# Patient Record
Sex: FEMALE | Race: WHITE | Employment: UNEMPLOYED | URBAN - METROPOLITAN AREA
[De-identification: names, ages, dates, MRNs, and addresses within clinical notes are randomized per-mention and may not be internally consistent; named-entity substitution may affect disease eponyms.]

---

## 2023-08-27 ENCOUNTER — HOSPITAL ENCOUNTER (INPATIENT)
Age: 73
LOS: 2 days | Discharge: HOME | DRG: 311 | End: 2023-08-29
Attending: INTERNAL MEDICINE | Admitting: INTERNAL MEDICINE
Payer: MEDICARE

## 2023-08-27 ENCOUNTER — APPOINTMENT (OUTPATIENT)
Dept: GENERAL RADIOLOGY | Age: 73
DRG: 311 | End: 2023-08-27
Payer: MEDICARE

## 2023-08-27 DIAGNOSIS — I49.8 ACCELERATED JUNCTIONAL RHYTHM: ICD-10-CM

## 2023-08-27 DIAGNOSIS — R07.9 CHEST PAIN, UNSPECIFIED TYPE: Primary | ICD-10-CM

## 2023-08-27 PROBLEM — E03.9 HYPOTHYROID: Status: ACTIVE | Noted: 2023-08-27

## 2023-08-27 PROBLEM — E78.00 HIGH CHOLESTEROL: Status: ACTIVE | Noted: 2023-08-27

## 2023-08-27 PROBLEM — I48.91 ATRIAL FIBRILLATION (HCC): Status: ACTIVE | Noted: 2023-08-27

## 2023-08-27 PROBLEM — I10 HTN (HYPERTENSION): Status: ACTIVE | Noted: 2023-08-27

## 2023-08-27 PROBLEM — I20.8 ANGINA AT REST (HCC): Status: ACTIVE | Noted: 2023-08-27

## 2023-08-27 LAB
ALBUMIN SERPL-MCNC: 3.6 G/DL (ref 3.4–5)
ALBUMIN/GLOB SERPL: 1 {RATIO} (ref 1.1–2.2)
ALP SERPL-CCNC: 96 U/L (ref 40–129)
ALT SERPL-CCNC: 15 U/L (ref 10–40)
ANION GAP SERPL CALCULATED.3IONS-SCNC: 9 MMOL/L (ref 3–16)
AST SERPL-CCNC: 31 U/L (ref 15–37)
BASOPHILS # BLD: 0 K/UL (ref 0–0.2)
BASOPHILS NFR BLD: 0.3 %
BILIRUB SERPL-MCNC: 0.7 MG/DL (ref 0–1)
BILIRUB UR QL STRIP.AUTO: NEGATIVE
BUN SERPL-MCNC: 17 MG/DL (ref 7–20)
CALCIUM SERPL-MCNC: 9.7 MG/DL (ref 8.3–10.6)
CHLORIDE SERPL-SCNC: 102 MMOL/L (ref 99–110)
CLARITY UR: CLEAR
CO2 SERPL-SCNC: 27 MMOL/L (ref 21–32)
COLOR UR: YELLOW
CREAT SERPL-MCNC: 0.9 MG/DL (ref 0.6–1.2)
D DIMER: 0.33 UG/ML FEU (ref 0–0.6)
D DIMER: <0.27 UG/ML FEU (ref 0–0.6)
DEPRECATED RDW RBC AUTO: 13.9 % (ref 12.4–15.4)
EOSINOPHIL # BLD: 0.1 K/UL (ref 0–0.6)
EOSINOPHIL NFR BLD: 1.6 %
GFR SERPLBLD CREATININE-BSD FMLA CKD-EPI: >60 ML/MIN/{1.73_M2}
GLUCOSE SERPL-MCNC: 115 MG/DL (ref 70–99)
GLUCOSE UR STRIP.AUTO-MCNC: NEGATIVE MG/DL
HCT VFR BLD AUTO: 39.1 % (ref 36–48)
HGB BLD-MCNC: 12.5 G/DL (ref 12–16)
HGB UR QL STRIP.AUTO: NEGATIVE
KETONES UR STRIP.AUTO-MCNC: NEGATIVE MG/DL
LEUKOCYTE ESTERASE UR QL STRIP.AUTO: NEGATIVE
LIPASE SERPL-CCNC: 32 U/L (ref 13–60)
LYMPHOCYTES # BLD: 1.1 K/UL (ref 1–5.1)
LYMPHOCYTES NFR BLD: 17.1 %
MCH RBC QN AUTO: 28.5 PG (ref 26–34)
MCHC RBC AUTO-ENTMCNC: 31.9 G/DL (ref 31–36)
MCV RBC AUTO: 89.5 FL (ref 80–100)
MONOCYTES # BLD: 0.5 K/UL (ref 0–1.3)
MONOCYTES NFR BLD: 7.6 %
NEUTROPHILS # BLD: 4.7 K/UL (ref 1.7–7.7)
NEUTROPHILS NFR BLD: 73.4 %
NITRITE UR QL STRIP.AUTO: NEGATIVE
NT-PROBNP SERPL-MCNC: 331 PG/ML (ref 0–124)
PH UR STRIP.AUTO: 5.5 [PH] (ref 5–8)
PLATELET # BLD AUTO: 164 K/UL (ref 135–450)
PMV BLD AUTO: 9 FL (ref 5–10.5)
POTASSIUM SERPL-SCNC: 3.9 MMOL/L (ref 3.5–5.1)
PROT SERPL-MCNC: 7.2 G/DL (ref 6.4–8.2)
PROT UR STRIP.AUTO-MCNC: NEGATIVE MG/DL
RBC # BLD AUTO: 4.37 M/UL (ref 4–5.2)
SODIUM SERPL-SCNC: 138 MMOL/L (ref 136–145)
SP GR UR STRIP.AUTO: >=1.03 (ref 1–1.03)
TROPONIN, HIGH SENSITIVITY: 33 NG/L (ref 0–14)
TROPONIN, HIGH SENSITIVITY: 37 NG/L (ref 0–14)
TROPONIN, HIGH SENSITIVITY: 39 NG/L (ref 0–14)
UA COMPLETE W REFLEX CULTURE PNL UR: NORMAL
UA DIPSTICK W REFLEX MICRO PNL UR: NORMAL
URN SPEC COLLECT METH UR: NORMAL
UROBILINOGEN UR STRIP-ACNC: 0.2 E.U./DL
WBC # BLD AUTO: 6.3 K/UL (ref 4–11)

## 2023-08-27 PROCEDURE — 81003 URINALYSIS AUTO W/O SCOPE: CPT

## 2023-08-27 PROCEDURE — 6370000000 HC RX 637 (ALT 250 FOR IP): Performed by: INTERNAL MEDICINE

## 2023-08-27 PROCEDURE — 83880 ASSAY OF NATRIURETIC PEPTIDE: CPT

## 2023-08-27 PROCEDURE — 1200000000 HC SEMI PRIVATE

## 2023-08-27 PROCEDURE — 99285 EMERGENCY DEPT VISIT HI MDM: CPT

## 2023-08-27 PROCEDURE — 85379 FIBRIN DEGRADATION QUANT: CPT

## 2023-08-27 PROCEDURE — 2580000003 HC RX 258: Performed by: INTERNAL MEDICINE

## 2023-08-27 PROCEDURE — 85025 COMPLETE CBC W/AUTO DIFF WBC: CPT

## 2023-08-27 PROCEDURE — 36415 COLL VENOUS BLD VENIPUNCTURE: CPT

## 2023-08-27 PROCEDURE — 83690 ASSAY OF LIPASE: CPT

## 2023-08-27 PROCEDURE — 2060000000 HC ICU INTERMEDIATE R&B

## 2023-08-27 PROCEDURE — 71045 X-RAY EXAM CHEST 1 VIEW: CPT

## 2023-08-27 PROCEDURE — 93005 ELECTROCARDIOGRAM TRACING: CPT | Performed by: INTERNAL MEDICINE

## 2023-08-27 PROCEDURE — 84484 ASSAY OF TROPONIN QUANT: CPT

## 2023-08-27 PROCEDURE — 6370000000 HC RX 637 (ALT 250 FOR IP): Performed by: PHYSICIAN ASSISTANT

## 2023-08-27 PROCEDURE — 80053 COMPREHEN METABOLIC PANEL: CPT

## 2023-08-27 RX ORDER — POTASSIUM CHLORIDE 7.45 MG/ML
10 INJECTION INTRAVENOUS PRN
Status: DISCONTINUED | OUTPATIENT
Start: 2023-08-27 | End: 2023-08-29 | Stop reason: HOSPADM

## 2023-08-27 RX ORDER — ACETAMINOPHEN 650 MG/1
650 SUPPOSITORY RECTAL EVERY 6 HOURS PRN
Status: DISCONTINUED | OUTPATIENT
Start: 2023-08-27 | End: 2023-08-29 | Stop reason: HOSPADM

## 2023-08-27 RX ORDER — LOTEPREDNOL ETABONATE 5 MG/ML
1 SUSPENSION/ DROPS OPHTHALMIC 2 TIMES DAILY
COMMUNITY

## 2023-08-27 RX ORDER — HYDRALAZINE HYDROCHLORIDE 20 MG/ML
10 INJECTION INTRAMUSCULAR; INTRAVENOUS EVERY 6 HOURS PRN
Status: DISCONTINUED | OUTPATIENT
Start: 2023-08-27 | End: 2023-08-29 | Stop reason: HOSPADM

## 2023-08-27 RX ORDER — HYDROCORTISONE AND IODOQUINOL 10; 10 MG/G; MG/G
CREAM TOPICAL 2 TIMES DAILY PRN
COMMUNITY

## 2023-08-27 RX ORDER — SPIRONOLACTONE 25 MG/1
25 TABLET ORAL DAILY
COMMUNITY

## 2023-08-27 RX ORDER — ENOXAPARIN SODIUM 100 MG/ML
40 INJECTION SUBCUTANEOUS DAILY
Status: DISCONTINUED | OUTPATIENT
Start: 2023-08-27 | End: 2023-08-27 | Stop reason: ALTCHOICE

## 2023-08-27 RX ORDER — ATORVASTATIN CALCIUM 10 MG/1
10 TABLET, FILM COATED ORAL DAILY
Status: DISCONTINUED | OUTPATIENT
Start: 2023-08-27 | End: 2023-08-28

## 2023-08-27 RX ORDER — RIVAROXABAN 10 MG/1
10 TABLET, FILM COATED ORAL EVERY EVENING
COMMUNITY

## 2023-08-27 RX ORDER — POTASSIUM CHLORIDE 20 MEQ/1
40 TABLET, EXTENDED RELEASE ORAL PRN
Status: DISCONTINUED | OUTPATIENT
Start: 2023-08-27 | End: 2023-08-29 | Stop reason: HOSPADM

## 2023-08-27 RX ORDER — METOPROLOL SUCCINATE 50 MG/1
50 TABLET, EXTENDED RELEASE ORAL EVERY EVENING
COMMUNITY

## 2023-08-27 RX ORDER — ACETAMINOPHEN 325 MG/1
650 TABLET ORAL EVERY 6 HOURS PRN
Status: DISCONTINUED | OUTPATIENT
Start: 2023-08-27 | End: 2023-08-29 | Stop reason: HOSPADM

## 2023-08-27 RX ORDER — ASPIRIN 81 MG/1
324 TABLET, CHEWABLE ORAL ONCE
Status: COMPLETED | OUTPATIENT
Start: 2023-08-27 | End: 2023-08-27

## 2023-08-27 RX ORDER — DOFETILIDE 0.5 MG/1
500 CAPSULE ORAL 2 TIMES DAILY
COMMUNITY

## 2023-08-27 RX ORDER — LOSARTAN POTASSIUM 100 MG/1
100 TABLET ORAL DAILY
Status: DISCONTINUED | OUTPATIENT
Start: 2023-08-27 | End: 2023-08-29 | Stop reason: HOSPADM

## 2023-08-27 RX ORDER — ALBUTEROL SULFATE 90 UG/1
2 AEROSOL, METERED RESPIRATORY (INHALATION) EVERY 6 HOURS PRN
COMMUNITY

## 2023-08-27 RX ORDER — ONDANSETRON 4 MG/1
4 TABLET, ORALLY DISINTEGRATING ORAL EVERY 8 HOURS PRN
Status: DISCONTINUED | OUTPATIENT
Start: 2023-08-27 | End: 2023-08-29 | Stop reason: HOSPADM

## 2023-08-27 RX ORDER — METFORMIN HYDROCHLORIDE 500 MG/1
500 TABLET, EXTENDED RELEASE ORAL
COMMUNITY

## 2023-08-27 RX ORDER — FUROSEMIDE 40 MG/1
40 TABLET ORAL 2 TIMES DAILY PRN
COMMUNITY

## 2023-08-27 RX ORDER — SODIUM CHLORIDE 0.9 % (FLUSH) 0.9 %
5-40 SYRINGE (ML) INJECTION EVERY 12 HOURS SCHEDULED
Status: DISCONTINUED | OUTPATIENT
Start: 2023-08-27 | End: 2023-08-29 | Stop reason: HOSPADM

## 2023-08-27 RX ORDER — 0.9 % SODIUM CHLORIDE 0.9 %
500 INTRAVENOUS SOLUTION INTRAVENOUS PRN
Status: DISCONTINUED | OUTPATIENT
Start: 2023-08-27 | End: 2023-08-29 | Stop reason: HOSPADM

## 2023-08-27 RX ORDER — ASPIRIN 81 MG/1
81 TABLET, CHEWABLE ORAL DAILY
Status: DISCONTINUED | OUTPATIENT
Start: 2023-08-28 | End: 2023-08-29 | Stop reason: HOSPADM

## 2023-08-27 RX ORDER — LEVOTHYROXINE SODIUM 0.1 MG/1
100 TABLET ORAL DAILY
Status: DISCONTINUED | OUTPATIENT
Start: 2023-08-27 | End: 2023-08-29 | Stop reason: HOSPADM

## 2023-08-27 RX ORDER — ATORVASTATIN CALCIUM 20 MG/1
20 TABLET, FILM COATED ORAL NIGHTLY
COMMUNITY

## 2023-08-27 RX ORDER — METOPROLOL SUCCINATE 50 MG/1
50 TABLET, EXTENDED RELEASE ORAL DAILY
Status: DISCONTINUED | OUTPATIENT
Start: 2023-08-27 | End: 2023-08-29 | Stop reason: HOSPADM

## 2023-08-27 RX ORDER — SODIUM CHLORIDE 0.9 % (FLUSH) 0.9 %
10 SYRINGE (ML) INJECTION PRN
Status: DISCONTINUED | OUTPATIENT
Start: 2023-08-27 | End: 2023-08-29 | Stop reason: HOSPADM

## 2023-08-27 RX ORDER — FUROSEMIDE 20 MG/1
20 TABLET ORAL 2 TIMES DAILY
Status: DISCONTINUED | OUTPATIENT
Start: 2023-08-27 | End: 2023-08-29 | Stop reason: HOSPADM

## 2023-08-27 RX ORDER — LOSARTAN POTASSIUM 25 MG/1
25 TABLET ORAL EVERY EVENING
COMMUNITY

## 2023-08-27 RX ORDER — LEVOTHYROXINE SODIUM 0.05 MG/1
50 TABLET ORAL DAILY
COMMUNITY

## 2023-08-27 RX ORDER — POTASSIUM CHLORIDE 20 MEQ/1
40 TABLET, EXTENDED RELEASE ORAL PRN
Status: DISCONTINUED | OUTPATIENT
Start: 2023-08-27 | End: 2023-08-27 | Stop reason: SDUPTHER

## 2023-08-27 RX ORDER — POTASSIUM CHLORIDE 7.45 MG/ML
10 INJECTION INTRAVENOUS PRN
Status: DISCONTINUED | OUTPATIENT
Start: 2023-08-27 | End: 2023-08-27 | Stop reason: SDUPTHER

## 2023-08-27 RX ORDER — NITROGLYCERIN 0.4 MG/1
0.4 TABLET SUBLINGUAL EVERY 5 MIN PRN
Status: DISCONTINUED | OUTPATIENT
Start: 2023-08-27 | End: 2023-08-29 | Stop reason: HOSPADM

## 2023-08-27 RX ORDER — ATORVASTATIN CALCIUM 40 MG/1
40 TABLET, FILM COATED ORAL NIGHTLY
Status: DISCONTINUED | OUTPATIENT
Start: 2023-08-27 | End: 2023-08-27 | Stop reason: SDUPTHER

## 2023-08-27 RX ORDER — ONDANSETRON 2 MG/ML
4 INJECTION INTRAMUSCULAR; INTRAVENOUS EVERY 6 HOURS PRN
Status: DISCONTINUED | OUTPATIENT
Start: 2023-08-27 | End: 2023-08-28 | Stop reason: ALTCHOICE

## 2023-08-27 RX ORDER — SODIUM CHLORIDE 9 MG/ML
INJECTION, SOLUTION INTRAVENOUS PRN
Status: DISCONTINUED | OUTPATIENT
Start: 2023-08-27 | End: 2023-08-29 | Stop reason: HOSPADM

## 2023-08-27 RX ADMIN — LOSARTAN POTASSIUM 100 MG: 100 TABLET, FILM COATED ORAL at 17:43

## 2023-08-27 RX ADMIN — METOPROLOL SUCCINATE 50 MG: 50 TABLET, EXTENDED RELEASE ORAL at 17:44

## 2023-08-27 RX ADMIN — ASPIRIN 81 MG 324 MG: 81 TABLET ORAL at 14:14

## 2023-08-27 RX ADMIN — ATORVASTATIN CALCIUM 10 MG: 10 TABLET, FILM COATED ORAL at 17:43

## 2023-08-27 RX ADMIN — FUROSEMIDE 20 MG: 20 TABLET ORAL at 17:43

## 2023-08-27 RX ADMIN — RIVAROXABAN 10 MG: 10 TABLET, FILM COATED ORAL at 17:43

## 2023-08-27 RX ADMIN — SODIUM CHLORIDE, PRESERVATIVE FREE 10 ML: 5 INJECTION INTRAVENOUS at 22:06

## 2023-08-27 ASSESSMENT — ENCOUNTER SYMPTOMS
ABDOMINAL PAIN: 0
NAUSEA: 0
DIARRHEA: 0
SHORTNESS OF BREATH: 0
VOMITING: 0
CHEST TIGHTNESS: 0
COUGH: 0

## 2023-08-27 ASSESSMENT — PAIN SCALES - GENERAL: PAINLEVEL_OUTOF10: 3

## 2023-08-27 ASSESSMENT — LIFESTYLE VARIABLES
HOW MANY STANDARD DRINKS CONTAINING ALCOHOL DO YOU HAVE ON A TYPICAL DAY: PATIENT DOES NOT DRINK
HOW OFTEN DO YOU HAVE A DRINK CONTAINING ALCOHOL: NEVER

## 2023-08-27 ASSESSMENT — HEART SCORE: ECG: 1

## 2023-08-27 NOTE — ED NOTES
ED TO INPATIENT SBAR HANDOFF    Patient Name: Trevon Mcgrath   :  1950  71 y.o. MRN:  [id number]  Preferred Name  Maylin Lynn   ED Room #:  ED-0019/19  Family/Caregiver Present yes   Restraints no   Sitter no   Sepsis Risk Score Sepsis Risk Score: 0.46    Situation  Code Status: No Order No additional code details. Allergies: Ceftin [cefuroxime], Vancomycin, Zestril [lisinopril], and Other  Weight: Patient Vitals for the past 96 hrs (Last 3 readings):   Weight   23 1148 260 lb (117.9 kg)     Arrived from: home  Chief Complaint:   Chief Complaint   Patient presents with    Chest Pain     PT to ED c/o mid lower chest/epigastric pain that began this past Friday morning after waking up. PT denies nausea. PT has not taken ASA today. PT reports history of A-fib. Pt is on blood thinners. Pt home town [place] and is here for a concert. PT reports unchanged SOB or swelling to legs. Hospital Problem/Diagnosis:  Principal Problem:    Chest pain  Active Problems:    HTN (hypertension)    High cholesterol    Atrial fibrillation (HCC)    Hypothyroid    Angina at rest [facility])  Resolved Problems:    * No resolved hospital problems. *    Imaging:   XR CHEST PORTABLE   Final Result   Bibasilar opacification, left side greater than right, may represent   atelectasis or pneumonia.            Abnormal labs:   Abnormal Labs Reviewed   COMPREHENSIVE METABOLIC PANEL - Abnormal; Notable for the following components:       Result Value    Glucose 115 (*)     Albumin/Globulin Ratio 1.0 (*)     All other components within normal limits   TROPONIN - Abnormal; Notable for the following components:    Troponin, High Sensitivity 39 (*)     All other components within normal limits   BRAIN NATRIURETIC PEPTIDE - Abnormal; Notable for the following components:    Pro- (*)     All other components within normal limits   TROPONIN - Abnormal; Notable for the following components:    Troponin, High Sensitivity 33 (*)     All other

## 2023-08-27 NOTE — H&P
cholesterol [E78.00]     Atrial fibrillation (HCC) [I48.91]     Hypothyroid [E03.9]     Chest pain [R07.9]          Review of Systems: (1 system for EPF, 2-9 for detailed, 10+ for comprehensive)  Constitutional: Negative for chills and fever. HENT: Negative for dental problem, nosebleeds and rhinorrhea. Eyes: Negative for photophobia and visual disturbance. Respiratory: Negative for cough, chest tightness and shortness of breath. Cardiovascular: Negative for chest pain and leg swelling. Gastrointestinal: Negative for diarrhea, nausea and vomiting. Endocrine: Negative for polydipsia and polyphagia. Genitourinary: Negative for frequency, hematuria and urgency. Musculoskeletal: Negative for back pain and myalgias. Skin: Negative for rash. Allergic/Immunologic: Negative for food allergies. Neurological: Negative for dizziness, seizures, syncope and facial asymmetry. Hematological: Negative for adenopathy. Psychiatric/Behavioral: Negative for dysphoric mood. The patient is not nervous/anxious. Past Medical History:   Past Medical History:   Diagnosis Date    A-fib ([address])     Hyperlipidemia     Hypertension        Past Surgical History: History reviewed. No pertinent surgical history. Social History:   Social History       Tobacco History       Smoking Status  Never      Smokeless Tobacco Use  Never              Alcohol History       Alcohol Use Status  Never              Drug Use       Drug Use Status  Never              Sexual Activity       Sexually Active  Defer                    Fam History: History reviewed. No pertinent family history. PFSH: The above PMHx, PSHx, SocHx, FamHx has been reviewed by myself. (1 area for detailed, 2-3 for comprehensive)      Code Status: No Order    Meds - following list of home medications fromelectronic chart has been reviewed by myself  Prior to Admission medications    Medication Sig Start Date End Date Taking?

## 2023-08-27 NOTE — ED NOTES
Report given to 3T RN. No further questions at this time. Pt transported to floor on tele.       Ana Hutchison RN  08/27/23 9716

## 2023-08-28 ENCOUNTER — APPOINTMENT (OUTPATIENT)
Dept: CT IMAGING | Age: 73
DRG: 311 | End: 2023-08-28
Payer: MEDICARE

## 2023-08-28 LAB
ANION GAP SERPL CALCULATED.3IONS-SCNC: 9 MMOL/L (ref 3–16)
BASOPHILS # BLD: 0 K/UL (ref 0–0.2)
BASOPHILS NFR BLD: 0.4 %
BUN SERPL-MCNC: 16 MG/DL (ref 7–20)
CALCIUM SERPL-MCNC: 9.6 MG/DL (ref 8.3–10.6)
CHLORIDE SERPL-SCNC: 105 MMOL/L (ref 99–110)
CHOLEST SERPL-MCNC: 127 MG/DL (ref 0–199)
CO2 SERPL-SCNC: 25 MMOL/L (ref 21–32)
CREAT SERPL-MCNC: 0.8 MG/DL (ref 0.6–1.2)
DEPRECATED RDW RBC AUTO: 13.7 % (ref 12.4–15.4)
EKG ATRIAL RATE: 91 BPM
EKG DIAGNOSIS: NORMAL
EKG P-R INTERVAL: 310 MS
EKG Q-T INTERVAL: 370 MS
EKG QRS DURATION: 92 MS
EKG QTC CALCULATION (BAZETT): 450 MS
EKG R AXIS: -11 DEGREES
EKG T AXIS: 49 DEGREES
EKG VENTRICULAR RATE: 89 BPM
EOSINOPHIL # BLD: 0.2 K/UL (ref 0–0.6)
EOSINOPHIL NFR BLD: 3.1 %
GFR SERPLBLD CREATININE-BSD FMLA CKD-EPI: >60 ML/MIN/{1.73_M2}
GLUCOSE SERPL-MCNC: 107 MG/DL (ref 70–99)
HCT VFR BLD AUTO: 37.2 % (ref 36–48)
HDLC SERPL-MCNC: 35 MG/DL (ref 40–60)
HGB BLD-MCNC: 12.2 G/DL (ref 12–16)
LDLC SERPL CALC-MCNC: 69 MG/DL
LYMPHOCYTES # BLD: 1.1 K/UL (ref 1–5.1)
LYMPHOCYTES NFR BLD: 20.9 %
MAGNESIUM SERPL-MCNC: 1.8 MG/DL (ref 1.8–2.4)
MCH RBC QN AUTO: 29 PG (ref 26–34)
MCHC RBC AUTO-ENTMCNC: 32.8 G/DL (ref 31–36)
MCV RBC AUTO: 88.5 FL (ref 80–100)
MONOCYTES # BLD: 0.4 K/UL (ref 0–1.3)
MONOCYTES NFR BLD: 8.1 %
NEUTROPHILS # BLD: 3.6 K/UL (ref 1.7–7.7)
NEUTROPHILS NFR BLD: 67.5 %
PLATELET # BLD AUTO: 161 K/UL (ref 135–450)
PMV BLD AUTO: 8.8 FL (ref 5–10.5)
POTASSIUM SERPL-SCNC: 4 MMOL/L (ref 3.5–5.1)
RBC # BLD AUTO: 4.2 M/UL (ref 4–5.2)
SODIUM SERPL-SCNC: 139 MMOL/L (ref 136–145)
TRIGL SERPL-MCNC: 114 MG/DL (ref 0–150)
VLDLC SERPL CALC-MCNC: 23 MG/DL
WBC # BLD AUTO: 5.4 K/UL (ref 4–11)

## 2023-08-28 PROCEDURE — 6360000004 HC RX CONTRAST MEDICATION: Performed by: INTERNAL MEDICINE

## 2023-08-28 PROCEDURE — 83735 ASSAY OF MAGNESIUM: CPT

## 2023-08-28 PROCEDURE — 2580000003 HC RX 258: Performed by: INTERNAL MEDICINE

## 2023-08-28 PROCEDURE — 6370000000 HC RX 637 (ALT 250 FOR IP): Performed by: INTERNAL MEDICINE

## 2023-08-28 PROCEDURE — 1200000000 HC SEMI PRIVATE

## 2023-08-28 PROCEDURE — 36415 COLL VENOUS BLD VENIPUNCTURE: CPT

## 2023-08-28 PROCEDURE — 80048 BASIC METABOLIC PNL TOTAL CA: CPT

## 2023-08-28 PROCEDURE — 99223 1ST HOSP IP/OBS HIGH 75: CPT | Performed by: INTERNAL MEDICINE

## 2023-08-28 PROCEDURE — 93010 ELECTROCARDIOGRAM REPORT: CPT | Performed by: INTERNAL MEDICINE

## 2023-08-28 PROCEDURE — 85025 COMPLETE CBC W/AUTO DIFF WBC: CPT

## 2023-08-28 PROCEDURE — 80061 LIPID PANEL: CPT

## 2023-08-28 PROCEDURE — 75574 CT ANGIO HRT W/3D IMAGE: CPT

## 2023-08-28 RX ORDER — DOFETILIDE 0.25 MG/1
500 CAPSULE ORAL EVERY 12 HOURS SCHEDULED
Status: DISCONTINUED | OUTPATIENT
Start: 2023-08-28 | End: 2023-08-29 | Stop reason: HOSPADM

## 2023-08-28 RX ORDER — ATORVASTATIN CALCIUM 20 MG/1
20 TABLET, FILM COATED ORAL DAILY
Status: DISCONTINUED | OUTPATIENT
Start: 2023-08-29 | End: 2023-08-29 | Stop reason: HOSPADM

## 2023-08-28 RX ORDER — METOPROLOL SUCCINATE 25 MG/1
25 TABLET, EXTENDED RELEASE ORAL DAILY
Status: DISCONTINUED | OUTPATIENT
Start: 2023-08-28 | End: 2023-08-28

## 2023-08-28 RX ORDER — METOPROLOL TARTRATE 5 MG/5ML
5 INJECTION INTRAVENOUS EVERY 5 MIN PRN
Status: DISCONTINUED | OUTPATIENT
Start: 2023-08-28 | End: 2023-08-29 | Stop reason: HOSPADM

## 2023-08-28 RX ORDER — METOPROLOL SUCCINATE 25 MG/1
25 TABLET, EXTENDED RELEASE ORAL ONCE
Status: COMPLETED | OUTPATIENT
Start: 2023-08-28 | End: 2023-08-28

## 2023-08-28 RX ADMIN — NITROGLYCERIN 0.4 MG: 0.4 TABLET, ORALLY DISINTEGRATING SUBLINGUAL at 13:10

## 2023-08-28 RX ADMIN — METOPROLOL SUCCINATE 25 MG: 25 TABLET, EXTENDED RELEASE ORAL at 10:40

## 2023-08-28 RX ADMIN — DOFETILIDE 500 MCG: 0.25 CAPSULE ORAL at 22:57

## 2023-08-28 RX ADMIN — ATORVASTATIN CALCIUM 10 MG: 10 TABLET, FILM COATED ORAL at 08:45

## 2023-08-28 RX ADMIN — ACETAMINOPHEN 650 MG: 325 TABLET ORAL at 06:11

## 2023-08-28 RX ADMIN — SODIUM CHLORIDE, PRESERVATIVE FREE 10 ML: 5 INJECTION INTRAVENOUS at 08:45

## 2023-08-28 RX ADMIN — SODIUM CHLORIDE, PRESERVATIVE FREE 10 ML: 5 INJECTION INTRAVENOUS at 22:57

## 2023-08-28 RX ADMIN — FUROSEMIDE 20 MG: 20 TABLET ORAL at 08:45

## 2023-08-28 RX ADMIN — ASPIRIN 81 MG 81 MG: 81 TABLET ORAL at 08:45

## 2023-08-28 RX ADMIN — LEVOTHYROXINE SODIUM 100 MCG: 0.1 TABLET ORAL at 06:11

## 2023-08-28 RX ADMIN — DOFETILIDE 500 MCG: 0.25 CAPSULE ORAL at 15:00

## 2023-08-28 RX ADMIN — IOPAMIDOL 120 ML: 755 INJECTION, SOLUTION INTRAVENOUS at 13:01

## 2023-08-28 RX ADMIN — FUROSEMIDE 20 MG: 20 TABLET ORAL at 18:01

## 2023-08-28 RX ADMIN — MAGNESIUM HYDROXIDE 30 ML: 400 SUSPENSION ORAL at 22:57

## 2023-08-28 RX ADMIN — LOSARTAN POTASSIUM 100 MG: 100 TABLET, FILM COATED ORAL at 08:45

## 2023-08-28 ASSESSMENT — PAIN DESCRIPTION - LOCATION: LOCATION: BACK

## 2023-08-28 ASSESSMENT — PAIN SCALES - GENERAL: PAINLEVEL_OUTOF10: 2

## 2023-08-28 ASSESSMENT — PAIN DESCRIPTION - DESCRIPTORS: DESCRIPTORS: SORE

## 2023-08-28 ASSESSMENT — PAIN DESCRIPTION - ORIENTATION: ORIENTATION: MID;UPPER

## 2023-08-28 NOTE — PLAN OF CARE
Shift assessment complete. Vital signs stable. Respirations even and unlabored. Telemetry on. Pt denies any chest pain. Plan of care discussed with patient. Pt de  Problem: Discharge Planning  Goal: Discharge to home or other facility with appropriate resources  Outcome: Progressing  Flowsheets  Taken 8/27/2023 2245 by Raymond Serrato RN  Discharge to home or other facility with appropriate resources: Identify barriers to discharge with patient and caregiver  Taken 8/27/2023 1700 by Charla Marmolejo RN  Discharge to home or other facility with appropriate resources:   Identify barriers to discharge with patient and caregiver   Identify discharge learning needs (meds, wound care, etc)   Arrange for needed discharge resources and transportation as appropriate     Problem: Safety - Adult  Goal: Free from fall injury  Outcome: Progressing     Problem: ABCDS Injury Assessment  Goal: Absence of physical injury  Outcome: Progressing   nies any further needs at this time.  Call light within reach

## 2023-08-28 NOTE — CARE COORDINATION
Chart reviewed at this time for discharge planning. Pt from [facility]. which is 3 hrs 39 minutes away. Pt has stress test and cardiology consult pending. CM will follow for d/c plan and needs.      Madeline Veloz RN, BSN  [phone]

## 2023-08-28 NOTE — CONSULTS
[address]  [phone]      Reason for consult:  chest pain    ASSESSMENT AND PLAN:  Persistent atrial fib, s/p cardioversion x 2, s/p PVI 2019, on maintenance dofetilide therapy  Chest pain of uncertain etiology - normal cath in 2010 in [place]  CAD risk factors including HTN, AF, lipids  Minimally elevated troponins in a pattern NOT c/w demand ischemia  \"Junctional\" tachycardia read by computer EKG was actually sinus with small P waves and 1st degree AV block    She is hemodynamically stable and her chest pain isn't severe, however it has been coming and going with a squeezing sensation. No worsening on exertion. She has a history of a false-positive Lexiscan and already has a lower HR, so a CTA would be a better diagnostic study for her. I ordered a CTA with IV metoprolol as needed to get HR < 60  She has been on a stable chronic dose of Tikosyn. QTc is not exceptionally long so can resume her home dose 500 BID without a need to re-load her as she only missed one dose last night    Hold Xarelto in case CTA is abnormal so that we could cath her tomorrow if needed. If CTA shows no significant coronary disease, ok to discharge home and f/u with her normal cardiology team in [place]    History of Present Illness:  Toni Cronin is a 71 y.o. patient who lives in [place] and really loves [place], having traveled to see consecutive concerts in [place], [facility], and [place]. She started having a feeling of chest pressure shortly after the concern earlier this weekened, which was coming and going, and given her prior cardiac history she decided to get checked out. I reviewed extensive [facility] in [facility]. Last cath was in 2010, she had PVI of AF in 2019 but is still maintained on tikoysn. She had a repeat ILR placed recently. She is on Xarelto chronically for stroke prophylaxis.     Last visit with cardiology 10/2022 (Report Reviewed in Care

## 2023-08-29 VITALS
RESPIRATION RATE: 17 BRPM | DIASTOLIC BLOOD PRESSURE: 75 MMHG | HEART RATE: 87 BPM | TEMPERATURE: 97.9 F | HEIGHT: 63 IN | BODY MASS INDEX: 45.36 KG/M2 | WEIGHT: 256 LBS | OXYGEN SATURATION: 93 % | SYSTOLIC BLOOD PRESSURE: 113 MMHG

## 2023-08-29 LAB
ANION GAP SERPL CALCULATED.3IONS-SCNC: 9 MMOL/L (ref 3–16)
BASOPHILS # BLD: 0 K/UL (ref 0–0.2)
BASOPHILS NFR BLD: 0.5 %
BUN SERPL-MCNC: 18 MG/DL (ref 7–20)
CALCIUM SERPL-MCNC: 10 MG/DL (ref 8.3–10.6)
CHLORIDE SERPL-SCNC: 103 MMOL/L (ref 99–110)
CO2 SERPL-SCNC: 29 MMOL/L (ref 21–32)
CREAT SERPL-MCNC: 1 MG/DL (ref 0.6–1.2)
DEPRECATED RDW RBC AUTO: 13.8 % (ref 12.4–15.4)
EOSINOPHIL # BLD: 0.1 K/UL (ref 0–0.6)
EOSINOPHIL NFR BLD: 3.1 %
GFR SERPLBLD CREATININE-BSD FMLA CKD-EPI: 60 ML/MIN/{1.73_M2}
GLUCOSE SERPL-MCNC: 112 MG/DL (ref 70–99)
HCT VFR BLD AUTO: 37.9 % (ref 36–48)
HGB BLD-MCNC: 12.3 G/DL (ref 12–16)
LYMPHOCYTES # BLD: 1 K/UL (ref 1–5.1)
LYMPHOCYTES NFR BLD: 20.4 %
MAGNESIUM SERPL-MCNC: 2.1 MG/DL (ref 1.8–2.4)
MCH RBC QN AUTO: 28.7 PG (ref 26–34)
MCHC RBC AUTO-ENTMCNC: 32.5 G/DL (ref 31–36)
MCV RBC AUTO: 88.2 FL (ref 80–100)
MONOCYTES # BLD: 0.4 K/UL (ref 0–1.3)
MONOCYTES NFR BLD: 9.4 %
NEUTROPHILS # BLD: 3.1 K/UL (ref 1.7–7.7)
NEUTROPHILS NFR BLD: 66.6 %
PLATELET # BLD AUTO: 169 K/UL (ref 135–450)
PMV BLD AUTO: 8.9 FL (ref 5–10.5)
POTASSIUM SERPL-SCNC: 4.5 MMOL/L (ref 3.5–5.1)
RBC # BLD AUTO: 4.29 M/UL (ref 4–5.2)
SODIUM SERPL-SCNC: 141 MMOL/L (ref 136–145)
TROPONIN, HIGH SENSITIVITY: 37 NG/L (ref 0–14)
WBC # BLD AUTO: 4.7 K/UL (ref 4–11)

## 2023-08-29 PROCEDURE — 36415 COLL VENOUS BLD VENIPUNCTURE: CPT

## 2023-08-29 PROCEDURE — 80048 BASIC METABOLIC PNL TOTAL CA: CPT

## 2023-08-29 PROCEDURE — 6370000000 HC RX 637 (ALT 250 FOR IP): Performed by: INTERNAL MEDICINE

## 2023-08-29 PROCEDURE — 83735 ASSAY OF MAGNESIUM: CPT

## 2023-08-29 PROCEDURE — 85025 COMPLETE CBC W/AUTO DIFF WBC: CPT

## 2023-08-29 PROCEDURE — 2580000003 HC RX 258: Performed by: INTERNAL MEDICINE

## 2023-08-29 PROCEDURE — 84484 ASSAY OF TROPONIN QUANT: CPT

## 2023-08-29 RX ADMIN — SODIUM CHLORIDE, PRESERVATIVE FREE 10 ML: 5 INJECTION INTRAVENOUS at 09:33

## 2023-08-29 RX ADMIN — LEVOTHYROXINE SODIUM 100 MCG: 0.1 TABLET ORAL at 06:56

## 2023-08-29 RX ADMIN — ASPIRIN 81 MG 81 MG: 81 TABLET ORAL at 09:33

## 2023-08-29 RX ADMIN — ACETAMINOPHEN 650 MG: 325 TABLET ORAL at 06:56

## 2023-08-29 RX ADMIN — ATORVASTATIN CALCIUM 20 MG: 20 TABLET, FILM COATED ORAL at 09:32

## 2023-08-29 RX ADMIN — METOPROLOL SUCCINATE 50 MG: 50 TABLET, EXTENDED RELEASE ORAL at 09:33

## 2023-08-29 RX ADMIN — LOSARTAN POTASSIUM 100 MG: 100 TABLET, FILM COATED ORAL at 09:33

## 2023-08-29 RX ADMIN — FUROSEMIDE 20 MG: 20 TABLET ORAL at 09:33

## 2023-08-29 RX ADMIN — DOFETILIDE 500 MCG: 0.25 CAPSULE ORAL at 10:24

## 2023-08-29 ASSESSMENT — PAIN SCALES - GENERAL: PAINLEVEL_OUTOF10: 2

## 2023-08-29 NOTE — CARE COORDINATION
08/29/23 1152   IMM Letter   IMM Letter given to Patient/Family/Significant other/Guardian/POA/by: Donnie Dumas RN CM   IMM Letter date given: 08/29/23   IMM Letter time given: 7773  (copy made for hard chart)

## 2023-08-29 NOTE — CARE COORDINATION
Patient discharged 8/29/2023 to home   All discharge needs met per case management     Patient discharged 8/29/2023 to home. All discharge needs met per case management.     Vanesa Cobos, RN, BSN  [phone]

## 2023-08-29 NOTE — PLAN OF CARE
Problem: Discharge Planning  Goal: Discharge to home or other facility with appropriate resources  8/29/2023 1317 by Lisa Albright RN  Outcome: Adequate for Discharge  8/29/2023 1316 by Lisa Albright RN  Outcome: Adequate for Discharge  Flowsheets (Taken 8/29/2023 0900)  Discharge to home or other facility with appropriate resources:   Arrange for needed discharge resources and transportation as appropriate   Identify barriers to discharge with patient and caregiver   Identify discharge learning needs (meds, wound care, etc)  4/49/9804 9982 by Tristen Ku RN  Outcome: Progressing     Problem: Safety - Adult  Goal: Free from fall injury  8/29/2023 1317 by Lisa Albright RN  Outcome: Adequate for Discharge  8/29/2023 1316 by Lisa Albright RN  Outcome: Adequate for Discharge  9/61/3773 0971 by Tristen Ku RN  Outcome: Progressing     Problem: ABCDS Injury Assessment  Goal: Absence of physical injury  8/29/2023 1317 by Lisa Albright RN  Outcome: Adequate for Discharge  8/29/2023 1316 by Lisa Albright RN  Outcome: Adequate for Discharge  8/51/0703 2731 by Tristen Ku RN  Outcome: Progressing

## 2023-08-29 NOTE — PLAN OF CARE
Problem: Discharge Planning  Goal: Discharge to home or other facility with appropriate resources  8/29/2023 1316 by Marie Alcantar RN  Outcome: Adequate for Discharge  Flowsheets (Taken 8/29/2023 0900)  Discharge to home or other facility with appropriate resources:   Arrange for needed discharge resources and transportation as appropriate   Identify barriers to discharge with patient and caregiver   Identify discharge learning needs (meds, wound care, etc)  9/12/2691 7664 by Delon Proctor RN  Outcome: Progressing     Problem: Safety - Adult  Goal: Free from fall injury  8/29/2023 1316 by Marie Alcantar RN  Outcome: Adequate for Discharge  9/96/2271 1221 by Delon Proctor RN  Outcome: Progressing     Problem: ABCDS Injury Assessment  Goal: Absence of physical injury  8/29/2023 1316 by Marie Alcantar RN  Outcome: Adequate for Discharge  5/86/2961 0686 by Delon Proctor RN  Outcome: Progressing

## 2023-08-29 NOTE — DISCHARGE SUMMARY
[facility] -- Physician Discharge Summary     Teodoro Krishnamurthy  1950  MRN: [id number]    Admit Date: 8/27/2023  Discharge Date: No discharge date for patient encounter. Attending MD: Mel Webb MD  Discharging MD: Mel Webb MD  PCP: No primary care provider on file. No primary physician on file. None    Admission Diagnosis: Angina at rest [facility]) [I20.8]  Accelerated junctional rhythm [I49.8]  Chest pain, unspecified type [R07.9]  DISCHARGE DIAGNOSIS: same    Full Hospital Problem List:  Active Hospital Problems    Diagnosis Date Noted    HTN (hypertension) [I10] 08/27/2023    High cholesterol [E78.00] 08/27/2023    Atrial fibrillation ([address]) [I48.91] 08/27/2023    Hypothyroid [E03.9] 08/27/2023    Chest pain [R07.9] 08/27/2023    Angina at rest [facility]) [I20.8] 08/27/2023           Hospital Course:  71 y.o. female with a history of hypertension, hyperlipidemia, atrial fibrillation, chronic anticoagulation with Xarelto, diabetes and obesity who presents to the emergency department today stating she is in town from [facility] [place] for a concert. She states she woke up Friday morning with chest pain. She describes this chest pain as an achy, intermittent, 2/10 pain that localizes to her mid sternum and she states the pain does not radiate. Her last episode of chest pain was just prior to arrival while walking into the emergency department. She denies shortness of breath, palpitations, diaphoresis, lightheadedness or dizziness. She denies fevers, chills, coughing or recent illness. She has no GI complaints     EKG concerning for accelerated junctional rhythm. Patient has a history of atrial fibrillation. Initial troponin is 39 and delta troponin is 33.  proBNP is only 331 and chest x-ray shows bibasilar opacification, left side greater than right, likely atelectasis. D-dimer is negative.   CBC, BMP, LFTs and urinalysis are unremarkable     Given possible EKG changes and elevated troponin -

## 2023-08-29 NOTE — PLAN OF CARE
CT scan read by radiology late yesterday pm  I personally reviewed films    There is mild calcium/plaque but no significant flow-limiting stenosis    No need for a cath    Steve Hernandez to d/c patient from a CV standpoint - she should follow up with her regular cardiologist in [facility]

## 2023-08-30 NOTE — PROGRESS NOTES
Awake, sitting up in chair, says bed is uncomfortable. Denies having any chest pain, does c/o of occasional SOB. Plan of care reviewed with pt, EDUARDO. VSS. Assessment completed, see flow charts. No distress noted. nimesh
DISCHARGE SUMMARY from Nurse    What to do at Home:  Recommended activity: activity as tolerated    If you experience any of the following symptoms SOB, sudden weight gain, chest pain please follow up with PCP. *  Please give a list of your current medications to your Primary Care Provider. *  Please update this list whenever your medications are discontinued, doses are      changed, or new medications (including over-the-counter products) are added. *  Please carry medication information at all times in case of emergency situations. These are general instructions for a healthy lifestyle:    No smoking/ No tobacco products/ Avoid exposure to second hand smoke  Surgeon General's Warning:  Quitting smoking now greatly reduces serious risk to your health. Obesity, smoking, and sedentary lifestyle greatly increases your risk for illness    A healthy diet, regular physical exercise & weight monitoring are important for maintaining a healthy lifestyle    You may be retaining fluid if you have a history of heart failure or if you experience any of the following symptoms:  Weight gain of 3 pounds or more overnight or 5 pounds in a week, increased swelling in our hands or feet or shortness of breath while lying flat in bed. Please call your doctor as soon as you notice any of these symptoms; do not wait until your next office visit. The discharge information has been reviewed with the patient. The patient verbalized understanding. Discharge medications reviewed with the patient and appropriate educational materials and side effects teaching were provided.   ___________________________________________________________________________________________________________________________________
Pharmacy Home Medication Reconciliation Note    A medication reconciliation has been completed for Jaimie Parkinson 1950    Pharm[place], [facility] [place]  Information provided by: patient    The patient's home medication list is as follows: No current facility-administered medications on file prior to encounter. Current Outpatient Medications on File Prior to Encounter   Medication Sig Dispense Refill    levothyroxine (SYNTHROID) 50 MCG tablet Take 1 tablet by mouth Daily      rivaroxaban (XARELTO) 10 MG TABS tablet Take 1 tablet by mouth every evening      atorvastatin (LIPITOR) 20 MG tablet Take 1 tablet by mouth nightly      losartan (COZAAR) 25 MG tablet Take 1 tablet by mouth every evening      metoprolol succinate (TOPROL XL) 50 MG extended release tablet Take 1 tablet by mouth every evening      furosemide (LASIX) 40 MG tablet Take 1 tablet by mouth 2 times daily as needed      Hydrocortisone-Iodoquinol (DERMAZENE) 1-1 % CREA Apply topically 2 times daily as needed Apply topically twice as needed under breasts and groin area. metFORMIN (GLUCOPHAGE-XR) 500 MG extended release tablet Take 1 tablet by mouth daily (with breakfast) Take one by mouth daily for the first week then increase to two tabs daily after that.      tobramycin-dexamethasone (TOBRADEX) 0.3-0.1 % ophthalmic ointment Place into both eyes nightly Apply to both eyes nightly. urea (CARMOL) 10 % cream Apply topically 2 times daily as needed Apply topically twice daily as needed to breasts and groin.       dofetilide (TIKOSYN) 500 MCG capsule Take 1 capsule by mouth 2 times daily      spironolactone (ALDACTONE) 25 MG tablet Take 1 tablet by mouth daily      albuterol sulfate HFA (PROAIR HFA) 108 (90 Base) MCG/ACT inhaler Inhale 2 puffs into the lungs every 6 hours as needed for Wheezing      Potassium 99 MG TABS Take 1 tablet by mouth daily      loteprednol (LOTEMAX) 0.5 % ophthalmic
Physician Progress Note      PATIENT:               Nini Miranda  CSN #:                  [id number]  :                       1950  ADMIT DATE:       2023 11:36 AM  DISCH DATE:        2023 1:36 PM  RESPONDING  PROVIDER #:        Kinga Meyer MD          QUERY TEXT:    Patient admitted with Chest pain, noted to have Persistent atrial fibrillation   and is maintained on Xarelto. If possible, please document in progress notes   and discharge summary if you are evaluating and/or treating any of the   following: The medical record reflects the following:  Risk Factors: Chest pain, High cholesterol, Atrial fibrillation, Advance age. Clinical Indicators: CHADS2-VASc 3 (HTN1, vasc disease 1, gender 1). PN notes   -Atrial fibrillation -Established problem. Stable. Treatment: Xarelto, cards eval.  Options provided:  -- Secondary hypercoagulable state in a patient with atrial fibrillation  -- Other - I will add my own diagnosis  -- Disagree - Not applicable / Not valid  -- Disagree - Clinically unable to determine / Unknown  -- Refer to Clinical Documentation Reviewer    PROVIDER RESPONSE TEXT:    This patient has secondary hypercoagulable state in a patient with atrial   fibrillation. Query created by:  Brandon Almodovar on 2023 12:22 PM      Electronically signed by:  Kinga Meyer MD 2023 8:37 AM
Pt admitted for elevated trop, chest pain    Full h+p to follow    Active Hospital Problems    Diagnosis Date Noted    HTN (hypertension) [I10] 08/27/2023    High cholesterol [E78.00] 08/27/2023    Atrial fibrillation ([address]) [I48.91] 08/27/2023    Hypothyroid [E03.9] 08/27/2023    Chest pain [R07.9] 08/27/2023       Please use [employer] to contact me with any questions during the day. The hospitalist service will provide cross-coverage for this patient from 7pm to 7am.    During those hours, contact the on-call hospitalist MD/NEAL for questions.
problem. Stable. Plan: Continue present orders/plan. Angina at rest [facility])  Plan: cards eval pending      Case discussed with: rn  Tests ordered/reviewed: cbc, bmp, trop, ekg          (Please note that portions of this note were completed with a voice recognition program.  Efforts were made to edit the dictations but occasionally words are mis-transcribed.)        Giovanna Hui MD  8/28/2023    Please use [employer] to contact me with any questions during the day. The hospitalist service will provide cross-coverage for this patient from 7pm to 7am.    During those hours, contact the on-call hospitalist MD/NEAL for questions.

## 2023-10-30 PROBLEM — E66.01 SEVERE OBESITY (MULTI): Status: ACTIVE | Noted: 2022-09-14

## 2023-10-30 PROBLEM — I10 HTN (HYPERTENSION): Status: ACTIVE | Noted: 2023-08-27

## 2023-10-30 PROBLEM — M25.539 WRIST PAIN: Status: ACTIVE | Noted: 2023-10-30

## 2023-10-30 PROBLEM — E66.01 OBESITY, CLASS III, BMI 40-49.9 (MORBID OBESITY) (MULTI): Status: ACTIVE | Noted: 2019-03-19

## 2023-10-30 PROBLEM — R93.89 THICKENED ENDOMETRIUM: Status: ACTIVE | Noted: 2021-12-17

## 2023-10-30 PROBLEM — F41.1 ANXIETY STATE: Status: ACTIVE | Noted: 2019-03-19

## 2023-10-30 PROBLEM — L85.3 XEROSIS CUTIS: Status: ACTIVE | Noted: 2022-12-02

## 2023-10-30 PROBLEM — R60.9 DEPENDENT EDEMA: Status: ACTIVE | Noted: 2023-10-30

## 2023-10-30 PROBLEM — Z79.01 LONG TERM (CURRENT) USE OF ANTICOAGULANTS: Status: ACTIVE | Noted: 2023-04-16

## 2023-10-30 PROBLEM — I50.9 CHF (CONGESTIVE HEART FAILURE) (MULTI): Status: ACTIVE | Noted: 2023-10-30

## 2023-10-30 PROBLEM — R07.9 CHEST PAIN: Status: ACTIVE | Noted: 2023-08-27

## 2023-10-30 PROBLEM — I20.89 ANGINA AT REST (CMS-HCC): Status: ACTIVE | Noted: 2023-08-27

## 2023-10-30 PROBLEM — I48.19 PERSISTENT ATRIAL FIBRILLATION (MULTI): Status: ACTIVE | Noted: 2022-09-12

## 2023-10-30 PROBLEM — G89.29 CHRONIC RIGHT SHOULDER PAIN: Status: ACTIVE | Noted: 2021-07-14

## 2023-10-30 PROBLEM — I11.0 HYPERTENSIVE HEART DISEASE WITH HEART FAILURE (MULTI): Status: ACTIVE | Noted: 2023-04-16

## 2023-10-30 PROBLEM — M25.511 CHRONIC RIGHT SHOULDER PAIN: Status: ACTIVE | Noted: 2021-07-14

## 2023-10-30 PROBLEM — R73.09 IMPAIRED GLUCOSE METABOLISM: Status: ACTIVE | Noted: 2017-01-18

## 2023-10-30 PROBLEM — K76.9 LIVER LESION: Status: ACTIVE | Noted: 2023-10-30

## 2023-10-30 PROBLEM — M25.552 LEFT HIP PAIN: Status: ACTIVE | Noted: 2020-11-24

## 2023-10-30 PROBLEM — I51.9 MILD DIASTOLIC DYSFUNCTION: Status: ACTIVE | Noted: 2023-10-30

## 2023-10-30 PROBLEM — E78.00 HIGH CHOLESTEROL: Status: ACTIVE | Noted: 2023-08-27

## 2023-10-30 PROBLEM — I42.0 DILATED IDIOPATHIC CARDIOMYOPATHY (MULTI): Status: ACTIVE | Noted: 2023-10-30

## 2023-10-30 PROBLEM — E66.813 OBESITY, CLASS III, BMI 40-49.9 (MORBID OBESITY): Status: ACTIVE | Noted: 2019-03-19

## 2023-10-30 PROBLEM — J96.01 ACUTE RESPIRATORY FAILURE WITH HYPOXIA (MULTI): Status: ACTIVE | Noted: 2022-12-02

## 2023-10-30 PROBLEM — I10 BENIGN ESSENTIAL HYPERTENSION: Status: ACTIVE | Noted: 2022-10-01

## 2023-10-30 PROBLEM — I95.9 HYPOTENSION: Status: ACTIVE | Noted: 2023-10-30

## 2023-10-30 RX ORDER — METOPROLOL SUCCINATE 50 MG/1
50 TABLET, EXTENDED RELEASE ORAL NIGHTLY
COMMUNITY
Start: 2021-03-01 | End: 2024-05-09 | Stop reason: SDUPTHER

## 2023-10-30 RX ORDER — OLOPATADINE HYDROCHLORIDE 1 MG/ML
SOLUTION/ DROPS OPHTHALMIC EVERY 12 HOURS
COMMUNITY

## 2023-10-30 RX ORDER — PREDNISOLONE ACETATE 10 MG/ML
SUSPENSION/ DROPS OPHTHALMIC 2 TIMES DAILY PRN
COMMUNITY

## 2023-10-30 RX ORDER — NYSTATIN 100000 [USP'U]/G
POWDER TOPICAL
COMMUNITY
Start: 2016-01-18

## 2023-10-30 RX ORDER — MISOPROSTOL 200 UG/1
200 TABLET ORAL
COMMUNITY
Start: 2021-12-16

## 2023-10-30 RX ORDER — DOFETILIDE 0.5 MG/1
500 CAPSULE ORAL 2 TIMES DAILY
COMMUNITY
End: 2024-02-08 | Stop reason: SDUPTHER

## 2023-10-30 RX ORDER — SPIRONOLACTONE 25 MG/1
25 TABLET ORAL DAILY
COMMUNITY
Start: 2013-11-14

## 2023-10-30 RX ORDER — ATORVASTATIN CALCIUM 20 MG/1
20 TABLET, FILM COATED ORAL DAILY
COMMUNITY
Start: 2014-12-23

## 2023-10-30 RX ORDER — HYDROCORTISONE AND IODOQUINOL 10; 10 MG/G; MG/G
CREAM TOPICAL
COMMUNITY
Start: 2022-08-03

## 2023-10-30 RX ORDER — ALBUTEROL SULFATE 5 MG/ML
5 SOLUTION RESPIRATORY (INHALATION)
COMMUNITY

## 2023-10-30 RX ORDER — LOSARTAN POTASSIUM 25 MG/1
25 TABLET ORAL NIGHTLY
COMMUNITY
Start: 2017-07-18

## 2023-10-30 RX ORDER — MELOXICAM 7.5 MG/1
7.5 TABLET ORAL
COMMUNITY
Start: 2022-10-02

## 2023-10-30 RX ORDER — PANTOPRAZOLE SODIUM 40 MG/1
40 TABLET, DELAYED RELEASE ORAL
COMMUNITY
Start: 2021-10-13

## 2023-10-30 RX ORDER — POLYETHYLENE GLYCOL 400 AND PROPYLENE GLYCOL 4; 3 MG/ML; MG/ML
1 SOLUTION/ DROPS OPHTHALMIC
COMMUNITY
Start: 2014-03-07

## 2023-10-30 RX ORDER — ALBUTEROL SULFATE 90 UG/1
2 AEROSOL, METERED RESPIRATORY (INHALATION) EVERY 6 HOURS PRN
COMMUNITY
Start: 2013-11-11

## 2023-10-30 RX ORDER — UREA 40 %
CREAM (GRAM) TOPICAL
COMMUNITY
Start: 2019-01-24

## 2023-10-30 RX ORDER — METFORMIN HYDROCHLORIDE 500 MG/1
500 TABLET, EXTENDED RELEASE ORAL
COMMUNITY
End: 2023-12-28

## 2023-10-30 RX ORDER — FUROSEMIDE 40 MG/1
40 TABLET ORAL 2 TIMES DAILY PRN
COMMUNITY
Start: 2014-12-09 | End: 2024-01-22 | Stop reason: ALTCHOICE

## 2023-10-30 RX ORDER — IBUPROFEN 600 MG/1
600 TABLET ORAL EVERY 6 HOURS PRN
COMMUNITY
Start: 2021-12-22

## 2023-10-30 RX ORDER — BACITRACIN 500 [USP'U]/G
OINTMENT OPHTHALMIC
COMMUNITY
Start: 2021-07-16

## 2023-10-30 RX ORDER — LOTEPREDNOL ETABONATE 5 MG/ML
1 SUSPENSION/ DROPS OPHTHALMIC
COMMUNITY

## 2023-10-30 RX ORDER — TRIAMCINOLONE ACETONIDE 1 MG/G
OINTMENT TOPICAL
COMMUNITY

## 2023-10-30 RX ORDER — NEOMYCIN SULFATE, POLYMYXIN B SULFATE, BACITRACIN ZINC, HYDROCORTISONE 3.5; 10000; 400; 1 MG/G; [USP'U]/G; [USP'U]/G; MG/G
OINTMENT OPHTHALMIC
COMMUNITY

## 2023-10-30 RX ORDER — BUDESONIDE AND FORMOTEROL FUMARATE DIHYDRATE 160; 4.5 UG/1; UG/1
AEROSOL RESPIRATORY (INHALATION)
COMMUNITY
Start: 2014-03-18 | End: 2023-11-09 | Stop reason: ALTCHOICE

## 2023-10-30 RX ORDER — ASPIRIN 325 MG
50000 TABLET, DELAYED RELEASE (ENTERIC COATED) ORAL WEEKLY
COMMUNITY
Start: 2022-12-13

## 2023-10-30 RX ORDER — LEVOTHYROXINE SODIUM 50 UG/1
50 TABLET ORAL DAILY
COMMUNITY
Start: 2014-12-09

## 2023-11-08 PROBLEM — R07.9 CHEST PAIN: Status: RESOLVED | Noted: 2023-08-27 | Resolved: 2023-11-08

## 2023-11-08 PROBLEM — R00.0 TACHYCARDIA: Status: RESOLVED | Noted: 2022-10-27 | Resolved: 2023-11-08

## 2023-11-08 PROBLEM — I10 HTN (HYPERTENSION): Status: RESOLVED | Noted: 2023-08-27 | Resolved: 2023-11-08

## 2023-11-08 PROBLEM — I48.19 PERSISTENT ATRIAL FIBRILLATION (MULTI): Status: RESOLVED | Noted: 2022-09-12 | Resolved: 2023-11-08

## 2023-11-08 PROBLEM — R00.0 TACHYCARDIA: Status: ACTIVE | Noted: 2022-10-27

## 2023-11-08 PROBLEM — I95.9 HYPOTENSION: Status: RESOLVED | Noted: 2023-10-30 | Resolved: 2023-11-08

## 2023-11-09 ENCOUNTER — OFFICE VISIT (OUTPATIENT)
Dept: CARDIOLOGY | Facility: CLINIC | Age: 73
End: 2023-11-09
Payer: MEDICARE

## 2023-11-09 VITALS
WEIGHT: 265 LBS | HEIGHT: 63 IN | DIASTOLIC BLOOD PRESSURE: 74 MMHG | OXYGEN SATURATION: 92 % | BODY MASS INDEX: 46.95 KG/M2 | HEART RATE: 91 BPM | SYSTOLIC BLOOD PRESSURE: 110 MMHG

## 2023-11-09 DIAGNOSIS — Z79.899 VISIT FOR MONITORING TIKOSYN THERAPY: ICD-10-CM

## 2023-11-09 DIAGNOSIS — I48.0 PAROXYSMAL ATRIAL FIBRILLATION (MULTI): Primary | ICD-10-CM

## 2023-11-09 DIAGNOSIS — I50.32 CHRONIC DIASTOLIC CONGESTIVE HEART FAILURE (MULTI): ICD-10-CM

## 2023-11-09 DIAGNOSIS — Z51.81 VISIT FOR MONITORING TIKOSYN THERAPY: ICD-10-CM

## 2023-11-09 PROCEDURE — 1159F MED LIST DOCD IN RCRD: CPT | Performed by: STUDENT IN AN ORGANIZED HEALTH CARE EDUCATION/TRAINING PROGRAM

## 2023-11-09 PROCEDURE — 3078F DIAST BP <80 MM HG: CPT | Performed by: STUDENT IN AN ORGANIZED HEALTH CARE EDUCATION/TRAINING PROGRAM

## 2023-11-09 PROCEDURE — 1036F TOBACCO NON-USER: CPT | Performed by: STUDENT IN AN ORGANIZED HEALTH CARE EDUCATION/TRAINING PROGRAM

## 2023-11-09 PROCEDURE — 99214 OFFICE O/P EST MOD 30 MIN: CPT | Performed by: STUDENT IN AN ORGANIZED HEALTH CARE EDUCATION/TRAINING PROGRAM

## 2023-11-09 PROCEDURE — 3008F BODY MASS INDEX DOCD: CPT | Performed by: STUDENT IN AN ORGANIZED HEALTH CARE EDUCATION/TRAINING PROGRAM

## 2023-11-09 PROCEDURE — 3074F SYST BP LT 130 MM HG: CPT | Performed by: STUDENT IN AN ORGANIZED HEALTH CARE EDUCATION/TRAINING PROGRAM

## 2023-11-09 PROCEDURE — 93000 ELECTROCARDIOGRAM COMPLETE: CPT | Performed by: STUDENT IN AN ORGANIZED HEALTH CARE EDUCATION/TRAINING PROGRAM

## 2023-11-09 ASSESSMENT — ENCOUNTER SYMPTOMS
LOSS OF SENSATION IN FEET: 1
DEPRESSION: 1
OCCASIONAL FEELINGS OF UNSTEADINESS: 1

## 2023-11-09 NOTE — PROGRESS NOTES
Cardiac Electrophysiology Office Visit    Referred by Otto Avendano MD for No chief complaint on file.       HPI:  Helen Johnston is a 73 y.o. year old female patient with h/o hypertension, diastolic heart failure, diabetes, HLD, JENI (not complaint with CPAP) paroxysmal atrial fibrillation presenting today for establishing care and follow-up and management of her atrial fibrillation.     Post ablation Nov 2018 - pt has some tachycardia. Last known episode in     PMHx/PSHx: As above  Tobacco Denies, Alcohol Social, Caffeine use   1 cups of tea / day, Drug use  Denies    Objective  Allergies   Allergen Reactions    Lisinopril Anaphylaxis    Thiopental Shortness of breath    Vancomycin Shortness of breath    Cefuroxime Unknown    Erythromycin Swelling and Rash    Linezolid Itching and Swelling    Procainamide Other      Heart racing with dental procedure    Procaine Unknown    Silver Sulfadiazine Swelling and Other     SKIN BURNING      Current Outpatient Medications   Medication Instructions    albuterol 90 mcg/actuation inhaler 2 puffs, inhalation, Every 6 hours PRN    albuterol 5 mg, inhalation    atorvastatin (LIPITOR) 20 mg, oral, Daily    bacitracin ophthalmic ointment APPLY 1/4 INCH TO BOTH EYES AT BEDTIME    cetirizine (ZYRTEC) 10 mg capsule oral    cholecalciferol (VITAMIN D-3) 50,000 Units, oral, Weekly    dofetilide (TIKOSYN) 500 mcg, oral, 2 times daily    dyclonine (Sucrets) 2 mg lozenge 1 lozenge, mucous membrane    furosemide (LASIX) 40 mg, oral, 2 times daily PRN    hydrocortisone-iodoquinoL (Dermazene) 1-1 % cream in packet Topical    ibuprofen 600 mg, oral, Every 6 hours PRN    levothyroxine (SYNTHROID, LEVOXYL) 50 mcg, oral, Daily    losartan (COZAAR) 25 mg, oral, Nightly    loteprednol (Lotemax) 0.5 % ophthalmic suspension 1 drop    meloxicam (MOBIC) 7.5 mg, oral, Daily RT    metFORMIN XR (GLUCOPHAGE-XR) 500 mg, oral    metoprolol succinate XL (TOPROL-XL) 50 mg, oral, Nightly    miSOPROStoL  "(CYTOTEC) 200 mcg    neomycin-bacitracin-polymyxin-hydrocortisone (Cortisporin) 3.5-400-10,000 mg-unit/g-1% ophthalmic ointment ophthalmic (eye)    nystatin (Mycostatin) 100,000 unit/gram powder Topical, Apply to affected areas 2-3 times daily    olopatadine (Patanol) 0.1 % ophthalmic solution ophthalmic (eye), Every 12 hours    pantoprazole (PROTONIX) 40 mg, oral, Daily before breakfast    peg 400-propylene glycol (Systane, propylene glycoL,) 0.4-0.3 % drops ophthalmic drops 1 each    prednisoLONE acetate (Pred-Forte) 1 % ophthalmic suspension ophthalmic (eye), 2 times daily PRN    rivaroxaban (XARELTO) 20 mg, oral, Daily with evening meal    spironolactone (ALDACTONE) 25 mg, oral, Daily    tobramycin-dexamethasone (Tobradex) ophthalmic ointment Place into both eyes nightly Apply to both eyes nightly.    triamcinolone (Kenalog) 0.1 % ointment Topical    urea (Carmol) 40 % cream Topical         Visit Vitals  /74 (BP Location: Right arm, Patient Position: Sitting)   Pulse 91   Ht 1.6 m (5' 3\")   Wt 120 kg (265 lb)   SpO2 92%   BMI 46.94 kg/m²   Smoking Status Never   BSA 2.31 m²        Physical Exam  Vitals reviewed.   Constitutional:       Appearance: Normal appearance.   HENT:      Head: Normocephalic.   Cardiovascular:      Rate and Rhythm: Normal rate and regular rhythm.   Pulmonary:      Effort: Pulmonary effort is normal. No respiratory distress.      Breath sounds: No wheezing.   Skin:     General: Skin is warm and dry.      Capillary Refill: Capillary refill takes less than 2 seconds.   Neurological:      Mental Status: She is alert.   Psychiatric:         Mood and Affect: Mood normal.        My Interpretation of Reviewed Study(s):  Echo (June 2022): Normal left ventricular systolic function with an EF of 60 to 65%.  Mildly dilated left atrium with a right atrium upper limit of normal.  No pericardial effusion noted.  [id number] Score  Age 65-74: 1   Sex Female: 1   CHF History Yes: 1   HTN Yes: 1 "   Stroke/TIA/Thromboembolism No: 0   Vascular Dz: CAD/PAD/Aortic Plaque No: 0   DM No: 0   Total Score 4     Assessment/Plan   #Paroxysmal atrial fibrillation s/pCryoPVI (2018)  #Tikosyn Monitoring  #ILR in place (Replaced Sept 2022)  -AF Dx History: years ago, feels fatigue but not palpitations or fluttering sensation.  -h/o Cardioversion: Yes  -AAD Use: Dofetilide 500mcg BID (current)  -Anticoagulation use: Xarelto 20mg Daily (current)  Warfarin (stopped due to change in Rx)  - h/o Ablation: 2018  -[id number]: 4  -c/w AC: Xarelto 20mg Daily  -c/w metoprolol succinate 50 mg daily  - Qtc today 439, at baseline.  -Will move ILR to [facility] for remote monitoring    Return to Clinic: Patient should return to the EP Clinic in 6 months     Otto Rojas MD [facility]  Cardiac Electrophysiology  [email or web address]    **Disclaimer: This note was dictated by speech recognition, and every effort has been made to prevent any error in transcription, however minor errors may be present**

## 2023-11-20 ENCOUNTER — HOSPITAL ENCOUNTER (OUTPATIENT)
Dept: CARDIOLOGY | Facility: CLINIC | Age: 73
Discharge: HOME | End: 2023-11-20
Payer: MEDICARE

## 2023-11-20 DIAGNOSIS — I48.0 PAROXYSMAL ATRIAL FIBRILLATION (MULTI): ICD-10-CM

## 2023-11-20 DIAGNOSIS — Z95.818 IMPLANTABLE LOOP RECORDER PRESENT: ICD-10-CM

## 2023-11-20 PROCEDURE — 93298 REM INTERROG DEV EVAL SCRMS: CPT | Performed by: STUDENT IN AN ORGANIZED HEALTH CARE EDUCATION/TRAINING PROGRAM

## 2023-11-20 PROCEDURE — G2066 INTER DEVC REMOTE 30D: HCPCS

## 2023-11-27 ENCOUNTER — HOSPITAL ENCOUNTER (OUTPATIENT)
Dept: CARDIOLOGY | Facility: CLINIC | Age: 73
Discharge: HOME | End: 2023-11-27
Payer: MEDICARE

## 2023-11-27 DIAGNOSIS — Z95.818 IMPLANTABLE LOOP RECORDER PRESENT: ICD-10-CM

## 2023-11-27 DIAGNOSIS — I48.0 PAROXYSMAL ATRIAL FIBRILLATION (MULTI): ICD-10-CM

## 2023-11-29 ENCOUNTER — HOSPITAL ENCOUNTER (OUTPATIENT)
Dept: CARDIOLOGY | Facility: CLINIC | Age: 73
Discharge: HOME | End: 2023-11-29
Payer: MEDICARE

## 2023-11-29 DIAGNOSIS — I48.0 PAROXYSMAL ATRIAL FIBRILLATION (MULTI): ICD-10-CM

## 2023-11-29 DIAGNOSIS — Z95.818 IMPLANTABLE LOOP RECORDER PRESENT: ICD-10-CM

## 2023-11-30 ENCOUNTER — HOSPITAL ENCOUNTER (OUTPATIENT)
Dept: CARDIOLOGY | Facility: CLINIC | Age: 73
Discharge: HOME | End: 2023-11-30
Payer: MEDICARE

## 2023-11-30 DIAGNOSIS — Z95.818 IMPLANTABLE LOOP RECORDER PRESENT: ICD-10-CM

## 2023-11-30 DIAGNOSIS — I48.0 PAROXYSMAL ATRIAL FIBRILLATION (MULTI): ICD-10-CM

## 2023-11-30 PROCEDURE — G2066 INTER DEVC REMOTE 30D: HCPCS

## 2023-12-04 ENCOUNTER — HOSPITAL ENCOUNTER (OUTPATIENT)
Dept: CARDIOLOGY | Facility: CLINIC | Age: 73
Discharge: HOME | End: 2023-12-04
Payer: MEDICARE

## 2023-12-04 DIAGNOSIS — Z95.818 IMPLANTABLE LOOP RECORDER PRESENT: ICD-10-CM

## 2023-12-04 DIAGNOSIS — I48.0 PAROXYSMAL ATRIAL FIBRILLATION (MULTI): ICD-10-CM

## 2023-12-05 ENCOUNTER — HOSPITAL ENCOUNTER (OUTPATIENT)
Dept: CARDIOLOGY | Facility: CLINIC | Age: 73
Discharge: HOME | End: 2023-12-05
Payer: MEDICARE

## 2023-12-05 DIAGNOSIS — Z95.818 IMPLANTABLE LOOP RECORDER PRESENT: ICD-10-CM

## 2023-12-05 DIAGNOSIS — I48.0 PAROXYSMAL ATRIAL FIBRILLATION (MULTI): ICD-10-CM

## 2023-12-06 ENCOUNTER — HOSPITAL ENCOUNTER (OUTPATIENT)
Dept: CARDIOLOGY | Facility: CLINIC | Age: 73
Discharge: HOME | End: 2023-12-06
Payer: MEDICARE

## 2023-12-06 DIAGNOSIS — I48.0 PAROXYSMAL ATRIAL FIBRILLATION (MULTI): ICD-10-CM

## 2023-12-06 DIAGNOSIS — Z95.818 IMPLANTABLE LOOP RECORDER PRESENT: ICD-10-CM

## 2023-12-12 ENCOUNTER — HOSPITAL ENCOUNTER (OUTPATIENT)
Dept: CARDIOLOGY | Facility: CLINIC | Age: 73
Discharge: HOME | End: 2023-12-12
Payer: MEDICARE

## 2023-12-12 DIAGNOSIS — I48.0 PAROXYSMAL ATRIAL FIBRILLATION (MULTI): ICD-10-CM

## 2023-12-12 DIAGNOSIS — Z95.818 IMPLANTABLE LOOP RECORDER PRESENT: ICD-10-CM

## 2023-12-13 ENCOUNTER — HOSPITAL ENCOUNTER (OUTPATIENT)
Dept: CARDIOLOGY | Facility: CLINIC | Age: 73
Discharge: HOME | End: 2023-12-13
Payer: MEDICARE

## 2023-12-13 DIAGNOSIS — I48.0 PAROXYSMAL ATRIAL FIBRILLATION (MULTI): ICD-10-CM

## 2023-12-13 DIAGNOSIS — Z95.818 IMPLANTABLE LOOP RECORDER PRESENT: ICD-10-CM

## 2023-12-14 ENCOUNTER — HOSPITAL ENCOUNTER (OUTPATIENT)
Dept: CARDIOLOGY | Facility: CLINIC | Age: 73
Discharge: HOME | End: 2023-12-14
Payer: MEDICARE

## 2023-12-14 DIAGNOSIS — Z95.818 IMPLANTABLE LOOP RECORDER PRESENT: ICD-10-CM

## 2023-12-14 DIAGNOSIS — I48.0 PAROXYSMAL ATRIAL FIBRILLATION (MULTI): ICD-10-CM

## 2023-12-18 ENCOUNTER — HOSPITAL ENCOUNTER (OUTPATIENT)
Dept: CARDIOLOGY | Facility: CLINIC | Age: 73
Discharge: HOME | End: 2023-12-18
Payer: MEDICARE

## 2023-12-18 DIAGNOSIS — I48.0 PAROXYSMAL ATRIAL FIBRILLATION (MULTI): ICD-10-CM

## 2023-12-18 DIAGNOSIS — Z95.818 IMPLANTABLE LOOP RECORDER PRESENT: ICD-10-CM

## 2023-12-19 ENCOUNTER — HOSPITAL ENCOUNTER (EMERGENCY)
Facility: HOSPITAL | Age: 73
Discharge: HOME | End: 2023-12-19
Attending: STUDENT IN AN ORGANIZED HEALTH CARE EDUCATION/TRAINING PROGRAM
Payer: MEDICARE

## 2023-12-19 ENCOUNTER — APPOINTMENT (OUTPATIENT)
Dept: RADIOLOGY | Facility: HOSPITAL | Age: 73
End: 2023-12-19
Payer: MEDICARE

## 2023-12-19 VITALS
RESPIRATION RATE: 18 BRPM | BODY MASS INDEX: 47.29 KG/M2 | HEIGHT: 62 IN | WEIGHT: 257 LBS | OXYGEN SATURATION: 96 % | HEART RATE: 99 BPM | TEMPERATURE: 97.9 F | SYSTOLIC BLOOD PRESSURE: 165 MMHG | DIASTOLIC BLOOD PRESSURE: 85 MMHG

## 2023-12-19 DIAGNOSIS — N28.89 KIDNEY MASS: ICD-10-CM

## 2023-12-19 DIAGNOSIS — R10.9 FLANK PAIN: Primary | ICD-10-CM

## 2023-12-19 LAB
ALBUMIN SERPL BCP-MCNC: 3.9 G/DL (ref 3.4–5)
ALP SERPL-CCNC: 74 U/L (ref 33–136)
ALT SERPL W P-5'-P-CCNC: 12 U/L (ref 7–45)
ANION GAP SERPL CALC-SCNC: 7 MMOL/L (ref 10–20)
APPEARANCE UR: ABNORMAL
APTT PPP: 39 SECONDS (ref 27–38)
AST SERPL W P-5'-P-CCNC: 24 U/L (ref 9–39)
BASOPHILS # BLD AUTO: 0.01 X10*3/UL (ref 0–0.1)
BASOPHILS NFR BLD AUTO: 0.2 %
BILIRUB SERPL-MCNC: 0.5 MG/DL (ref 0–1.2)
BILIRUB UR STRIP.AUTO-MCNC: NEGATIVE MG/DL
BUN SERPL-MCNC: 19 MG/DL (ref 6–23)
CALCIUM SERPL-MCNC: 10.1 MG/DL (ref 8.6–10.3)
CHLORIDE SERPL-SCNC: 101 MMOL/L (ref 98–107)
CO2 SERPL-SCNC: 33 MMOL/L (ref 21–32)
COLOR UR: YELLOW
CREAT SERPL-MCNC: 1.08 MG/DL (ref 0.5–1.05)
EOSINOPHIL # BLD AUTO: 0.09 X10*3/UL (ref 0–0.4)
EOSINOPHIL NFR BLD AUTO: 1.5 %
ERYTHROCYTE [DISTWIDTH] IN BLOOD BY AUTOMATED COUNT: 12.8 % (ref 11.5–14.5)
GFR SERPL CREATININE-BSD FRML MDRD: 55 ML/MIN/1.73M*2
GLUCOSE SERPL-MCNC: 96 MG/DL (ref 74–99)
GLUCOSE UR STRIP.AUTO-MCNC: NEGATIVE MG/DL
HCT VFR BLD AUTO: 41.8 % (ref 36–46)
HGB BLD-MCNC: 12.9 G/DL (ref 12–16)
HOLD SPECIMEN: NORMAL
IMM GRANULOCYTES # BLD AUTO: 0.02 X10*3/UL (ref 0–0.5)
IMM GRANULOCYTES NFR BLD AUTO: 0.3 % (ref 0–0.9)
INR PPP: 1.5 (ref 0.9–1.1)
KETONES UR STRIP.AUTO-MCNC: NEGATIVE MG/DL
LEUKOCYTE ESTERASE UR QL STRIP.AUTO: NEGATIVE
LIPASE SERPL-CCNC: 35 U/L (ref 9–82)
LYMPHOCYTES # BLD AUTO: 0.79 X10*3/UL (ref 0.8–3)
LYMPHOCYTES NFR BLD AUTO: 13.4 %
MCH RBC QN AUTO: 28.6 PG (ref 26–34)
MCHC RBC AUTO-ENTMCNC: 30.9 G/DL (ref 32–36)
MCV RBC AUTO: 93 FL (ref 80–100)
MONOCYTES # BLD AUTO: 0.39 X10*3/UL (ref 0.05–0.8)
MONOCYTES NFR BLD AUTO: 6.6 %
NEUTROPHILS # BLD AUTO: 4.58 X10*3/UL (ref 1.6–5.5)
NEUTROPHILS NFR BLD AUTO: 78 %
NITRITE UR QL STRIP.AUTO: NEGATIVE
NRBC BLD-RTO: 0 /100 WBCS (ref 0–0)
PH UR STRIP.AUTO: 5 [PH]
PLATELET # BLD AUTO: 182 X10*3/UL (ref 150–450)
POTASSIUM SERPL-SCNC: 4.6 MMOL/L (ref 3.5–5.3)
PROT SERPL-MCNC: 7.5 G/DL (ref 6.4–8.2)
PROT UR STRIP.AUTO-MCNC: NEGATIVE MG/DL
PROTHROMBIN TIME: 17 SECONDS (ref 9.8–12.8)
RBC # BLD AUTO: 4.51 X10*6/UL (ref 4–5.2)
RBC # UR STRIP.AUTO: NEGATIVE /UL
SODIUM SERPL-SCNC: 136 MMOL/L (ref 136–145)
SP GR UR STRIP.AUTO: 1.02
UROBILINOGEN UR STRIP.AUTO-MCNC: <2 MG/DL
WBC # BLD AUTO: 5.9 X10*3/UL (ref 4.4–11.3)

## 2023-12-19 PROCEDURE — 74177 CT ABD & PELVIS W/CONTRAST: CPT

## 2023-12-19 PROCEDURE — 87086 URINE CULTURE/COLONY COUNT: CPT | Mod: STJLAB | Performed by: STUDENT IN AN ORGANIZED HEALTH CARE EDUCATION/TRAINING PROGRAM

## 2023-12-19 PROCEDURE — 85025 COMPLETE CBC W/AUTO DIFF WBC: CPT | Performed by: STUDENT IN AN ORGANIZED HEALTH CARE EDUCATION/TRAINING PROGRAM

## 2023-12-19 PROCEDURE — 83690 ASSAY OF LIPASE: CPT | Performed by: STUDENT IN AN ORGANIZED HEALTH CARE EDUCATION/TRAINING PROGRAM

## 2023-12-19 PROCEDURE — 2550000001 HC RX 255 CONTRASTS: Performed by: STUDENT IN AN ORGANIZED HEALTH CARE EDUCATION/TRAINING PROGRAM

## 2023-12-19 PROCEDURE — 99285 EMERGENCY DEPT VISIT HI MDM: CPT | Performed by: STUDENT IN AN ORGANIZED HEALTH CARE EDUCATION/TRAINING PROGRAM

## 2023-12-19 PROCEDURE — 74177 CT ABD & PELVIS W/CONTRAST: CPT | Performed by: RADIOLOGY

## 2023-12-19 PROCEDURE — 99284 EMERGENCY DEPT VISIT MOD MDM: CPT | Performed by: STUDENT IN AN ORGANIZED HEALTH CARE EDUCATION/TRAINING PROGRAM

## 2023-12-19 PROCEDURE — 85610 PROTHROMBIN TIME: CPT | Performed by: STUDENT IN AN ORGANIZED HEALTH CARE EDUCATION/TRAINING PROGRAM

## 2023-12-19 PROCEDURE — 81003 URINALYSIS AUTO W/O SCOPE: CPT | Performed by: STUDENT IN AN ORGANIZED HEALTH CARE EDUCATION/TRAINING PROGRAM

## 2023-12-19 PROCEDURE — 36415 COLL VENOUS BLD VENIPUNCTURE: CPT | Performed by: STUDENT IN AN ORGANIZED HEALTH CARE EDUCATION/TRAINING PROGRAM

## 2023-12-19 PROCEDURE — 85730 THROMBOPLASTIN TIME PARTIAL: CPT | Performed by: STUDENT IN AN ORGANIZED HEALTH CARE EDUCATION/TRAINING PROGRAM

## 2023-12-19 PROCEDURE — 80053 COMPREHEN METABOLIC PANEL: CPT | Performed by: STUDENT IN AN ORGANIZED HEALTH CARE EDUCATION/TRAINING PROGRAM

## 2023-12-19 RX ORDER — OXYCODONE HYDROCHLORIDE 5 MG/1
5 TABLET ORAL EVERY 6 HOURS PRN
Qty: 10 TABLET | Refills: 0 | Status: SHIPPED | OUTPATIENT
Start: 2023-12-19 | End: 2023-12-20 | Stop reason: SDUPTHER

## 2023-12-19 RX ADMIN — IOHEXOL 75 ML: 350 INJECTION, SOLUTION INTRAVENOUS at 19:40

## 2023-12-19 ASSESSMENT — LIFESTYLE VARIABLES
REASON UNABLE TO ASSESS: NO
HAVE YOU EVER FELT YOU SHOULD CUT DOWN ON YOUR DRINKING: NO
EVER HAD A DRINK FIRST THING IN THE MORNING TO STEADY YOUR NERVES TO GET RID OF A HANGOVER: NO
EVER FELT BAD OR GUILTY ABOUT YOUR DRINKING: NO
HAVE PEOPLE ANNOYED YOU BY CRITICIZING YOUR DRINKING: NO

## 2023-12-19 ASSESSMENT — COLUMBIA-SUICIDE SEVERITY RATING SCALE - C-SSRS
1. IN THE PAST MONTH, HAVE YOU WISHED YOU WERE DEAD OR WISHED YOU COULD GO TO SLEEP AND NOT WAKE UP?: NO
2. HAVE YOU ACTUALLY HAD ANY THOUGHTS OF KILLING YOURSELF?: NO
6. HAVE YOU EVER DONE ANYTHING, STARTED TO DO ANYTHING, OR PREPARED TO DO ANYTHING TO END YOUR LIFE?: NO

## 2023-12-19 ASSESSMENT — PAIN - FUNCTIONAL ASSESSMENT: PAIN_FUNCTIONAL_ASSESSMENT: 0-10

## 2023-12-19 ASSESSMENT — PAIN SCALES - GENERAL: PAINLEVEL_OUTOF10: 9

## 2023-12-20 ENCOUNTER — HOSPITAL ENCOUNTER (OUTPATIENT)
Dept: CARDIOLOGY | Facility: CLINIC | Age: 73
Discharge: HOME | End: 2023-12-20
Payer: MEDICARE

## 2023-12-20 DIAGNOSIS — I48.0 PAROXYSMAL ATRIAL FIBRILLATION (MULTI): ICD-10-CM

## 2023-12-20 DIAGNOSIS — Z95.818 IMPLANTABLE LOOP RECORDER PRESENT: ICD-10-CM

## 2023-12-20 RX ORDER — OXYCODONE HYDROCHLORIDE 5 MG/1
5 TABLET ORAL EVERY 6 HOURS PRN
Qty: 10 TABLET | Refills: 0 | Status: SHIPPED | OUTPATIENT
Start: 2023-12-20 | End: 2023-12-27

## 2023-12-20 NOTE — ED PROVIDER NOTES
Emergency Medicine Transition of Care Note.    I received Helen Johnston in signout from Dr. Segovia.  Please see the previous ED provider note for all HPI, PE and MDM up to the time of signout. This is in addition to the primary record.    In brief Helen Johnston is an 73 y.o. female presenting for   Chief Complaint   Patient presents with    Flank Pain     At the time of signout we were awaiting: CT abdomen and pelvis results     Diagnoses as of 12/21/23 2004   Flank pain   Kidney mass       Medical Decision Making  CT of the abdomen pelvis demonstrated again findings of Bilateral renal lesions previously described with recommendation for further evaluation with MRI.  Patient states that she is aware of these findings and is planning to follow-up with urology for further evaluation of this.  Meantime patient was given a prescription for a short course of oxycodone for pain management while at home.  Also advised follow-up with primary care provider, she states that she is tolerating her primary care provider and she was given a referral to establish care with a new provider primary care provider.  Otherwise, patient was subsequently discharged in stable condition.  She is agreement with this plan.    Please see ED course for the rest of the MDM.    Akbar Alvarez DO, PGY-2  Emergency Medicine    Plan of care discussed with the attending physician.        Final diagnoses:   None           Procedure  Procedures    DO Akbar Argueta DO  Resident  12/21/23 2006

## 2023-12-20 NOTE — DISCHARGE INSTRUCTIONS
Your work-up today has not revealed any acute emergent causes of your abdominal pain that would require you to stay in the hospital or need emergent intervention.  Please follow-up with your primary care provider in the next 1 to 2 days for ER follow-up after your visit with us today.     Please also follow-up with urology, Dr. Tucker, as soon as you are able to for ongoing management of your findings on CT scan.  Have given you a copy of these to take with you for your records.  It is very important to follow-up with these results for continued management.    If you have a return or worsening of symptoms including severe abdominal pain, blood in your stools or blood in your urine, chest pain, shortness of breath, fevers, chills, lightheadedness, dizziness or any new or concerning symptoms please seek immediate medical attention or come back to the ER.

## 2023-12-20 NOTE — ED PROVIDER NOTES
"HPI   Chief Complaint   Patient presents with    Flank Pain       HPI     Patient is a 73-year-old female with a past medical history of nephrolithiasis, COPD, asthma, A-fib on Xarelto, HTN, and prediabetes who presents to the ED with chief complaint of right flank pain.  Patient states that her pain started this morning.  She describes it as a \"cramping\" pain that is intermittent.  She states that she has a similar pain and occasionally travels from her right hip down to her right knee.  Patient states that sitting and laying flat tends to make pain worse while standing makes pain better.  She has tried ibuprofen but has not seem to get any relief.  She endorses having associated nausea, poor appetite.  She denies urinary symptoms.  No increase in urinary frequency, urgency, hematuria, or urine order.               No data recorded                Patient History   History reviewed. No pertinent past medical history.  History reviewed. No pertinent surgical history.  No family history on file.  Social History     Tobacco Use    Smoking status: Never    Smokeless tobacco: Never   Substance Use Topics    Alcohol use: Never    Drug use: Never       Physical Exam   ED Triage Vitals [12/19/23 1424]   Temp Heart Rate Resp BP   36.6 °C (97.9 °F) 86 18 153/79      SpO2 Temp Source Heart Rate Source Patient Position   96 % Temporal Monitor Sitting      BP Location FiO2 (%)     Right arm --       Physical Exam  Constitutional:       General: She is not in acute distress.     Appearance: She is obese.   Cardiovascular:      Rate and Rhythm: Normal rate and regular rhythm.      Pulses: Normal pulses.      Heart sounds: Normal heart sounds. No murmur heard.     No gallop.   Pulmonary:      Effort: Pulmonary effort is normal.      Breath sounds: Normal breath sounds. No wheezing, rhonchi or rales.   Abdominal:      General: Abdomen is flat. Bowel sounds are normal.      Palpations: Abdomen is soft.      Tenderness: There is no " abdominal tenderness. There is no right CVA tenderness, left CVA tenderness, guarding or rebound.   Musculoskeletal:         General: No tenderness or signs of injury.   Skin:     General: Skin is warm and dry.      Capillary Refill: Capillary refill takes less than 2 seconds.   Neurological:      Mental Status: She is alert and oriented to person, place, and time.      Cranial Nerves: No cranial nerve deficit.      Sensory: No sensory deficit.      Motor: No weakness.         ED Course & MDM        Medical Decision Making  Patient is a 73-year-old female with a past medical history of nephrolithiasis, COPD, asthma, A-fib on Xarelto, HTN, and prediabetes who presents to the ED with chief complaint of right flank pain.  Upon arrival, patient's vitals were /79, temp 36.620 Celsius, HR 86, respiration 18, and 96% on room air.  On blood work patient was noted to have had no leukocytosis, INR was slightly elevated at 1.5, PTT was slightly elevated at 39.  She was noted to only have a slightly elevated creatinine at 1.08.  CT abdomen pelvis was ordered to evaluate for any intra-abdominal pathologies.  Patient was signed out to Dr. Akbar Alvarez prior to results of CT abdomen pelvis.    Procedure  Procedures     Soto Howe MD  Resident  12/19/23 2005

## 2023-12-21 LAB — BACTERIA UR CULT: NO GROWTH

## 2023-12-23 ENCOUNTER — HOSPITAL ENCOUNTER (EMERGENCY)
Facility: HOSPITAL | Age: 73
Discharge: HOME | End: 2023-12-23
Attending: STUDENT IN AN ORGANIZED HEALTH CARE EDUCATION/TRAINING PROGRAM
Payer: MEDICARE

## 2023-12-23 VITALS
SYSTOLIC BLOOD PRESSURE: 134 MMHG | BODY MASS INDEX: 47.01 KG/M2 | WEIGHT: 257 LBS | DIASTOLIC BLOOD PRESSURE: 72 MMHG | TEMPERATURE: 98.1 F | RESPIRATION RATE: 18 BRPM | HEART RATE: 97 BPM | OXYGEN SATURATION: 94 %

## 2023-12-23 DIAGNOSIS — M54.50 CHRONIC RIGHT-SIDED LOW BACK PAIN WITHOUT SCIATICA: Primary | ICD-10-CM

## 2023-12-23 DIAGNOSIS — G89.29 CHRONIC RIGHT-SIDED LOW BACK PAIN WITHOUT SCIATICA: Primary | ICD-10-CM

## 2023-12-23 PROCEDURE — 99284 EMERGENCY DEPT VISIT MOD MDM: CPT | Performed by: STUDENT IN AN ORGANIZED HEALTH CARE EDUCATION/TRAINING PROGRAM

## 2023-12-23 PROCEDURE — 99283 EMERGENCY DEPT VISIT LOW MDM: CPT | Performed by: STUDENT IN AN ORGANIZED HEALTH CARE EDUCATION/TRAINING PROGRAM

## 2023-12-23 RX ORDER — PREDNISONE 50 MG/1
50 TABLET ORAL DAILY
Qty: 5 TABLET | Refills: 0 | Status: SHIPPED | OUTPATIENT
Start: 2023-12-23 | End: 2023-12-23 | Stop reason: SDUPTHER

## 2023-12-23 RX ORDER — CYCLOBENZAPRINE HCL 10 MG
5 TABLET ORAL EVERY 6 HOURS PRN
Qty: 12 TABLET | Refills: 0 | Status: SHIPPED | OUTPATIENT
Start: 2023-12-23 | End: 2024-05-09 | Stop reason: WASHOUT

## 2023-12-23 RX ORDER — CYCLOBENZAPRINE HCL 10 MG
5 TABLET ORAL EVERY 6 HOURS PRN
Qty: 12 TABLET | Refills: 0 | Status: SHIPPED | OUTPATIENT
Start: 2023-12-23 | End: 2023-12-23 | Stop reason: SDUPTHER

## 2023-12-23 RX ORDER — PREDNISONE 50 MG/1
50 TABLET ORAL DAILY
Qty: 5 TABLET | Refills: 0 | Status: SHIPPED | OUTPATIENT
Start: 2023-12-23 | End: 2023-12-28

## 2023-12-23 RX ORDER — LIDOCAINE 50 MG/G
1 PATCH TOPICAL DAILY
Qty: 6 PATCH | Refills: 0 | Status: SHIPPED | OUTPATIENT
Start: 2023-12-23 | End: 2023-12-23 | Stop reason: SDUPTHER

## 2023-12-23 RX ORDER — LIDOCAINE 50 MG/G
1 PATCH TOPICAL DAILY
Qty: 6 PATCH | Refills: 0 | Status: SHIPPED | OUTPATIENT
Start: 2023-12-23 | End: 2023-12-28 | Stop reason: ALTCHOICE

## 2023-12-23 ASSESSMENT — PAIN DESCRIPTION - ORIENTATION
ORIENTATION: RIGHT
ORIENTATION_2: LOWER

## 2023-12-23 ASSESSMENT — PAIN DESCRIPTION - DESCRIPTORS
DESCRIPTORS: ACHING
DESCRIPTORS_2: ACHING

## 2023-12-23 ASSESSMENT — PAIN SCALES - GENERAL
PAINLEVEL_OUTOF10: 6
PAINLEVEL_OUTOF10: 6

## 2023-12-23 ASSESSMENT — LIFESTYLE VARIABLES
HAVE YOU EVER FELT YOU SHOULD CUT DOWN ON YOUR DRINKING: NO
EVER FELT BAD OR GUILTY ABOUT YOUR DRINKING: NO
EVER HAD A DRINK FIRST THING IN THE MORNING TO STEADY YOUR NERVES TO GET RID OF A HANGOVER: NO
HAVE PEOPLE ANNOYED YOU BY CRITICIZING YOUR DRINKING: NO
REASON UNABLE TO ASSESS: NO

## 2023-12-23 ASSESSMENT — PAIN DESCRIPTION - ONSET: ONSET: ONGOING

## 2023-12-23 ASSESSMENT — PAIN DESCRIPTION - FREQUENCY: FREQUENCY: CONSTANT/CONTINUOUS

## 2023-12-23 ASSESSMENT — PAIN DESCRIPTION - LOCATION
LOCATION_2: BACK
LOCATION: LEG

## 2023-12-23 ASSESSMENT — COLUMBIA-SUICIDE SEVERITY RATING SCALE - C-SSRS
6. HAVE YOU EVER DONE ANYTHING, STARTED TO DO ANYTHING, OR PREPARED TO DO ANYTHING TO END YOUR LIFE?: NO
1. IN THE PAST MONTH, HAVE YOU WISHED YOU WERE DEAD OR WISHED YOU COULD GO TO SLEEP AND NOT WAKE UP?: NO
2. HAVE YOU ACTUALLY HAD ANY THOUGHTS OF KILLING YOURSELF?: NO

## 2023-12-23 ASSESSMENT — PAIN DESCRIPTION - PROGRESSION: CLINICAL_PROGRESSION: NOT CHANGED

## 2023-12-23 ASSESSMENT — PAIN DESCRIPTION - PAIN TYPE: TYPE: CHRONIC PAIN

## 2023-12-23 ASSESSMENT — PAIN - FUNCTIONAL ASSESSMENT: PAIN_FUNCTIONAL_ASSESSMENT: 0-10

## 2023-12-23 NOTE — ED PROVIDER NOTES
HPI   Chief Complaint   Patient presents with    Back Pain     Patient was seen in ER Tuesday cleared of UTI/ kidney stone. Patient sent home with pain medication. Pt states she followed up with her primary on Thursday. Pt states she had US of right leg negative for blood clot. Pt stated pain has remained and came to the ER for pain relief. Pt denies injury        YIMI Cervantes is a 74-year-old female with a history of kidney stone, chronic midline lower back pain, who presents to the ED with complaint of lower back pain that has progressively got worse since 4 days ago.  The patient was seen in the ED 4 days ago for similar symptoms and was prescribed oxycodone for pain management.  Patient reported that she has continued to take pain medications at home as prescribed without relief.  Patient denies weakness in her legs, tingling or numbness in the groin area, urinary incontinence or retention, bowel incontinence, or saddle anesthesia.  She denies fever, chest pain, abdominal pain, diarrhea or urinary symptoms.                  Della Coma Scale Score: 15                  Patient History   Past Medical History:   Diagnosis Date    Diabetes mellitus (CMS/[facility])      History reviewed. No pertinent surgical history.  No family history on file.  Social History     Tobacco Use    Smoking status: Never    Smokeless tobacco: Never   Substance Use Topics    Alcohol use: Never    Drug use: Never       Physical Exam   ED Triage Vitals [12/23/23 1629]   Temp Heart Rate Resp BP   36.7 °C (98.1 °F) 99 18 117/59      SpO2 Temp Source Heart Rate Source Patient Position   96 % Temporal Monitor Sitting      BP Location FiO2 (%)     Right arm --       Physical Exam  Vitals reviewed.   Constitutional:       General: She is not in acute distress.     Appearance: Normal appearance. She is obese. She is not ill-appearing or toxic-appearing.   HENT:      Head: Normocephalic and atraumatic.      Right Ear: External ear normal.      Left Ear:  External ear normal.      Nose: No congestion or rhinorrhea.      Mouth/Throat:      Pharynx: No oropharyngeal exudate or posterior oropharyngeal erythema.   Eyes:      General: No scleral icterus.        Right eye: No discharge.         Left eye: No discharge.      Extraocular Movements: Extraocular movements intact.      Conjunctiva/sclera: Conjunctivae normal.      Pupils: Pupils are equal, round, and reactive to light.   Cardiovascular:      Rate and Rhythm: Normal rate and regular rhythm.      Pulses: Normal pulses.      Heart sounds: Normal heart sounds. No murmur heard.     No friction rub. No gallop.   Pulmonary:      Effort: Pulmonary effort is normal. No respiratory distress.      Breath sounds: Normal breath sounds. No stridor. No wheezing, rhonchi or rales.   Abdominal:      General: Bowel sounds are normal. There is distension.      Palpations: Abdomen is soft. There is no mass.      Tenderness: There is no abdominal tenderness. There is no right CVA tenderness, left CVA tenderness, guarding or rebound.   Musculoskeletal:         General: No swelling or signs of injury. Normal range of motion.      Cervical back: Normal range of motion and neck supple.   Skin:     General: Skin is warm.      Capillary Refill: Capillary refill takes less than 2 seconds.      Coloration: Skin is not jaundiced or pale.      Findings: No bruising, erythema or lesion.   Neurological:      General: No focal deficit present.      Mental Status: She is alert and oriented to person, place, and time.      Sensory: No sensory deficit.      Motor: No weakness.   Psychiatric:         Mood and Affect: Mood normal.         Behavior: Behavior normal.         Thought Content: Thought content normal.         Judgment: Judgment normal.         ED Course & MDM   Diagnoses as of 12/23/23 1731   Chronic right-sided low back pain without sciatica       Medical Decision Making  Patient is a 74-year-old female with a history of kidney stone,  chronic midline lower back pain, who presents to the ED with complaint of lower back pain that has progressively got worse since 4 days ago.  Patient is awake, alert, and oriented.  She is well-appearing, nontoxic, not in acute distress.  No labs or imaging indicated ordered at this time.    The case was discussed with the attending, Dr. Cortés, who saw and evaluated the patient at bedside.  The patient received a prescription of lidocaine patch, Flexeril, and prednisone for pain management.  She maintained normal vital signs during the ED course, was cleared for discharge by the ED team.  Patient was discharged in a stable condition with instruction to follow-up with her primary care physician and a neurologist regarding her ED visit.        Procedure  Procedures     Danuta Mayen MD  Resident  12/23/23 1445       Danuta Mayen MD  Resident  12/23/23 3546

## 2023-12-23 NOTE — DISCHARGE INSTRUCTIONS
Use medication as prescribed. Come back to the ED if you have increased pain, tingling or numbness in your groin, weakness in your legs, urinary retention or incontinence, or bowel incontinence.    Follow up with your primary care physician and neurology regarding your ED visit.

## 2023-12-27 ENCOUNTER — HOSPITAL ENCOUNTER (OUTPATIENT)
Dept: CARDIOLOGY | Facility: CLINIC | Age: 73
Discharge: HOME | End: 2023-12-27
Payer: MEDICARE

## 2023-12-27 DIAGNOSIS — I48.0 PAROXYSMAL ATRIAL FIBRILLATION (MULTI): ICD-10-CM

## 2023-12-27 DIAGNOSIS — Z95.818 IMPLANTABLE LOOP RECORDER PRESENT: ICD-10-CM

## 2023-12-27 PROCEDURE — G2066 INTER DEVC REMOTE 30D: HCPCS

## 2023-12-27 PROCEDURE — 93298 REM INTERROG DEV EVAL SCRMS: CPT | Performed by: STUDENT IN AN ORGANIZED HEALTH CARE EDUCATION/TRAINING PROGRAM

## 2023-12-28 ENCOUNTER — HOSPITAL ENCOUNTER (OUTPATIENT)
Dept: CARDIOLOGY | Facility: CLINIC | Age: 73
Discharge: HOME | End: 2023-12-28
Payer: MEDICARE

## 2023-12-28 ENCOUNTER — OFFICE VISIT (OUTPATIENT)
Dept: PRIMARY CARE | Facility: CLINIC | Age: 73
End: 2023-12-28
Payer: MEDICARE

## 2023-12-28 ENCOUNTER — LAB (OUTPATIENT)
Dept: LAB | Facility: LAB | Age: 73
End: 2023-12-28
Payer: MEDICARE

## 2023-12-28 VITALS
SYSTOLIC BLOOD PRESSURE: 129 MMHG | OXYGEN SATURATION: 93 % | DIASTOLIC BLOOD PRESSURE: 73 MMHG | HEART RATE: 78 BPM | HEIGHT: 63 IN | BODY MASS INDEX: 45.54 KG/M2 | WEIGHT: 257 LBS | RESPIRATION RATE: 20 BRPM | TEMPERATURE: 98.2 F

## 2023-12-28 DIAGNOSIS — M54.41 ACUTE RIGHT-SIDED LOW BACK PAIN WITH RIGHT-SIDED SCIATICA: Primary | ICD-10-CM

## 2023-12-28 DIAGNOSIS — I48.0 PAROXYSMAL ATRIAL FIBRILLATION (MULTI): ICD-10-CM

## 2023-12-28 DIAGNOSIS — N28.89 RENAL MASS: ICD-10-CM

## 2023-12-28 DIAGNOSIS — N17.9 AKI (ACUTE KIDNEY INJURY) (CMS-HCC): ICD-10-CM

## 2023-12-28 DIAGNOSIS — Z95.818 IMPLANTABLE LOOP RECORDER PRESENT: ICD-10-CM

## 2023-12-28 LAB
ALBUMIN SERPL BCP-MCNC: 3.7 G/DL (ref 3.4–5)
ANION GAP SERPL CALC-SCNC: 7 MMOL/L (ref 10–20)
BUN SERPL-MCNC: 34 MG/DL (ref 6–23)
CALCIUM SERPL-MCNC: 10 MG/DL (ref 8.6–10.3)
CHLORIDE SERPL-SCNC: 102 MMOL/L (ref 98–107)
CO2 SERPL-SCNC: 34 MMOL/L (ref 21–32)
CREAT SERPL-MCNC: 0.98 MG/DL (ref 0.5–1.05)
GFR SERPL CREATININE-BSD FRML MDRD: 61 ML/MIN/1.73M*2
GLUCOSE SERPL-MCNC: 94 MG/DL (ref 74–99)
PHOSPHATE SERPL-MCNC: 2.8 MG/DL (ref 2.5–4.9)
POTASSIUM SERPL-SCNC: 3.9 MMOL/L (ref 3.5–5.3)
SODIUM SERPL-SCNC: 139 MMOL/L (ref 136–145)

## 2023-12-28 PROCEDURE — 3074F SYST BP LT 130 MM HG: CPT

## 2023-12-28 PROCEDURE — 3078F DIAST BP <80 MM HG: CPT

## 2023-12-28 PROCEDURE — 36415 COLL VENOUS BLD VENIPUNCTURE: CPT

## 2023-12-28 PROCEDURE — 1159F MED LIST DOCD IN RCRD: CPT

## 2023-12-28 PROCEDURE — 1160F RVW MEDS BY RX/DR IN RCRD: CPT

## 2023-12-28 PROCEDURE — 1125F AMNT PAIN NOTED PAIN PRSNT: CPT

## 2023-12-28 PROCEDURE — 80069 RENAL FUNCTION PANEL: CPT

## 2023-12-28 PROCEDURE — 99213 OFFICE O/P EST LOW 20 MIN: CPT

## 2023-12-28 PROCEDURE — 1036F TOBACCO NON-USER: CPT

## 2023-12-28 NOTE — ASSESSMENT & PLAN NOTE
Patient's right-sided lumbar paraspinal musculature tenderness and sciatica are improving on her current regimen of lidocaine patch, Flexeril, prednisone burst.  Recommended that she continue these as prescribed and follow-up with me or her primary care physician within a month.  ED precautions given.

## 2023-12-28 NOTE — ASSESSMENT & PLAN NOTE
Patient had mild SALLY on recent blood work so we will repeat an RFP today.  Unclear if this is related to the renal mass or not.

## 2023-12-28 NOTE — PROGRESS NOTES
I reviewed the resident/fellow's documentation and discussed the patient with the resident/fellow. I agree with the resident/fellow's medical decision making as documented in the note.     Mervat Gao MD

## 2023-12-28 NOTE — PROGRESS NOTES
"Subjective   Helen Johnston is a 73 y.o. female who presents for [facility] (New patient to [facility]. She is frustrated with her PCP with [facility].).  Patient presents to [facility] care.  About a week ago she was moving boxes and injured her back.  At that time she was also having some flank pain.  She is not sure if she slipped a disc in her back or if she was passing a kidney stone since she has had kidney stones in the past.  She went to the emergency room on 12/19, a CAT scan of her abdomen and pelvis was done which just showed a lesion on the kidney which she has known about and already follows with urology for.  There was nothing else concerning.  And she was not having any red flag symptoms of cauda equina.  She was given a few days of oxycodone medication and discharged home with close follow-up with her PCP.  The oxycodone did not help with her pain.  In the meantime she began having some swelling of her legs so she went to have a duplex ultrasound performed which was negative for DVT.  She presented back to the emergency room on 12/23 for the same back area pain this time it was radiating down her buttock and thigh with some numbness and tingling.  She was diagnosed with sciatica and given lidocaine patch, Flexeril, prednisone.  She feels that these are helping and her pain is improving.  She is still not having any saddle anesthesia, weakness, incontinence.  She also has since scheduled her MRI for her kidney lesion and this will be done at the end of January.  She already has scheduled follow-up with her urologist.  Of note, when her blood work was last checked in the emergency room she had a mild SALLY.  She has no history of chronic kidney disease.    Objective   /73 (BP Location: Right arm, Patient Position: Sitting)   Pulse 78   Temp 36.8 °C (98.2 °F)   Resp 20   Ht 1.588 m (5' 2.5\")   Wt 117 kg (257 lb)   SpO2 93%   BMI 46.26 kg/m²    PHYSICAL EXAM  Gen: Well appearing, in NAD  Eyes: " EOMI  Heart: RRR, no murmurs  Lungs: No increased work of breathing, CTAB, on RA  Extremities: WWP, cap refill <2sec  MSK: Patient has mild tenderness to palpation of the right lumbar paraspinal musculature.  There is no midline spinal tenderness.  There is no pain on palpation of the buttock, hip, lower extremity.  Straight leg raise negative bilaterally.  Normal strength, sensation, reflexes.  Neuro: Alert, symmetrical facies, moves all extremities equally  Skin: No rashes or lesions  Psych: Intermittently tearful    Assessment/Plan     Problem List Items Addressed This Visit       Lumbago - Primary     Patient's right-sided lumbar paraspinal musculature tenderness and sciatica are improving on her current regimen of lidocaine patch, Flexeril, prednisone burst.  Recommended that she continue these as prescribed and follow-up with me or her primary care physician within a month.  ED precautions given.         Renal mass     CT A/P in ED showed Indeterminate bilateral renal lesions as previously seen on 04/16/2023 CT and 07/10/2023 renal ultrasound. The appearance of the right renal lesion is concerning for an enhancing renal mass such as renal cell carcinoma. MRI is recommended for further evaluation.  She has already been following with urologist, Dr. Adam holman, for this.  She has an MRI already scheduled for the end of January.          SALLY (acute kidney injury) (CMS/HCC)     Patient had mild SALLY on recent blood work so we will repeat an RFP today.  Unclear if this is related to the renal mass or not.         Relevant Orders    Renal function panel      Elsie Ziegler DO  Family Medicine Resident, PGY-3  [facility]  [address] [place] [address] [phone]

## 2023-12-28 NOTE — ASSESSMENT & PLAN NOTE
CT A/P in ED showed Indeterminate bilateral renal lesions as previously seen on 04/16/2023 CT and 07/10/2023 renal ultrasound. The appearance of the right renal lesion is concerning for an enhancing renal mass such as renal cell carcinoma. MRI is recommended for further evaluation.  She has already been following with urologist, Dr. Adam holman, for this.  She has an MRI already scheduled for the end of January.

## 2023-12-29 DIAGNOSIS — N17.9 AKI (ACUTE KIDNEY INJURY) (CMS-HCC): Primary | ICD-10-CM

## 2023-12-29 NOTE — RESULT ENCOUNTER NOTE
Creatinine has gone back to normal at 0.98, down from 1.08 about 10 days ago.  However BUN has gone up to 34, this is up from 19 about 10 days ago.  Encouraged patient to lay off of the ibuprofen and only use Lasix when absolutely needed.  Also encouraged her to drink plenty of water, which she says is difficult for her currently.  She is going to try to increase her water intake.  Will order a repeat RFP for a few months.  Patient updated via telephone.

## 2024-01-02 ENCOUNTER — LAB (OUTPATIENT)
Dept: LAB | Facility: LAB | Age: 74
End: 2024-01-02
Payer: MEDICARE

## 2024-01-02 ENCOUNTER — HOSPITAL ENCOUNTER (OUTPATIENT)
Dept: CARDIOLOGY | Facility: CLINIC | Age: 74
Discharge: HOME | End: 2024-01-02
Payer: MEDICARE

## 2024-01-02 ENCOUNTER — HOSPITAL ENCOUNTER (OUTPATIENT)
Dept: RADIOLOGY | Facility: HOSPITAL | Age: 74
Discharge: HOME | End: 2024-01-02
Payer: MEDICARE

## 2024-01-02 DIAGNOSIS — I48.0 PAROXYSMAL ATRIAL FIBRILLATION (MULTI): ICD-10-CM

## 2024-01-02 DIAGNOSIS — D41.01 NEOPLASM OF UNCERTAIN BEHAVIOR OF RIGHT KIDNEY: Primary | ICD-10-CM

## 2024-01-02 DIAGNOSIS — Z95.818 IMPLANTABLE LOOP RECORDER PRESENT: ICD-10-CM

## 2024-01-02 DIAGNOSIS — D41.01 NEOPLASM OF UNCERTAIN BEHAVIOR OF RIGHT KIDNEY: ICD-10-CM

## 2024-01-02 LAB
APPEARANCE UR: CLEAR
BILIRUB UR STRIP.AUTO-MCNC: NEGATIVE MG/DL
COLOR UR: YELLOW
GLUCOSE UR STRIP.AUTO-MCNC: NEGATIVE MG/DL
KETONES UR STRIP.AUTO-MCNC: NEGATIVE MG/DL
LEUKOCYTE ESTERASE UR QL STRIP.AUTO: NEGATIVE
NITRITE UR QL STRIP.AUTO: NEGATIVE
PH UR STRIP.AUTO: 5 [PH]
PROT UR STRIP.AUTO-MCNC: NEGATIVE MG/DL
RBC # UR STRIP.AUTO: NEGATIVE /UL
SP GR UR STRIP.AUTO: 1.02
UROBILINOGEN UR STRIP.AUTO-MCNC: NORMAL MG/DL

## 2024-01-02 PROCEDURE — 81003 URINALYSIS AUTO W/O SCOPE: CPT

## 2024-01-02 PROCEDURE — 76770 US EXAM ABDO BACK WALL COMP: CPT

## 2024-01-02 PROCEDURE — 76770 US EXAM ABDO BACK WALL COMP: CPT | Performed by: RADIOLOGY

## 2024-01-08 ENCOUNTER — HOSPITAL ENCOUNTER (OUTPATIENT)
Dept: CARDIOLOGY | Facility: CLINIC | Age: 74
Discharge: HOME | End: 2024-01-08
Payer: MEDICARE

## 2024-01-08 DIAGNOSIS — Z95.818 IMPLANTABLE LOOP RECORDER PRESENT: ICD-10-CM

## 2024-01-08 DIAGNOSIS — I48.0 PAROXYSMAL ATRIAL FIBRILLATION (MULTI): ICD-10-CM

## 2024-01-09 ENCOUNTER — HOSPITAL ENCOUNTER (OUTPATIENT)
Dept: CARDIOLOGY | Facility: CLINIC | Age: 74
Discharge: HOME | End: 2024-01-09
Payer: MEDICARE

## 2024-01-09 DIAGNOSIS — I48.0 PAROXYSMAL ATRIAL FIBRILLATION (MULTI): ICD-10-CM

## 2024-01-09 DIAGNOSIS — Z95.818 IMPLANTABLE LOOP RECORDER PRESENT: ICD-10-CM

## 2024-01-10 ENCOUNTER — HOSPITAL ENCOUNTER (OUTPATIENT)
Dept: CARDIOLOGY | Facility: CLINIC | Age: 74
Discharge: HOME | End: 2024-01-10
Payer: MEDICARE

## 2024-01-10 DIAGNOSIS — I48.0 PAROXYSMAL ATRIAL FIBRILLATION (MULTI): ICD-10-CM

## 2024-01-10 DIAGNOSIS — Z95.818 IMPLANTABLE LOOP RECORDER PRESENT: ICD-10-CM

## 2024-01-11 ENCOUNTER — HOSPITAL ENCOUNTER (OUTPATIENT)
Dept: CARDIOLOGY | Facility: CLINIC | Age: 74
Discharge: HOME | End: 2024-01-11
Payer: MEDICARE

## 2024-01-11 DIAGNOSIS — I48.0 PAROXYSMAL ATRIAL FIBRILLATION (MULTI): ICD-10-CM

## 2024-01-11 DIAGNOSIS — Z95.818 IMPLANTABLE LOOP RECORDER PRESENT: ICD-10-CM

## 2024-01-15 ENCOUNTER — HOSPITAL ENCOUNTER (OUTPATIENT)
Dept: CARDIOLOGY | Facility: CLINIC | Age: 74
Discharge: HOME | End: 2024-01-15
Payer: MEDICARE

## 2024-01-15 DIAGNOSIS — Z95.818 IMPLANTABLE LOOP RECORDER PRESENT: ICD-10-CM

## 2024-01-15 DIAGNOSIS — I48.0 PAROXYSMAL ATRIAL FIBRILLATION (MULTI): ICD-10-CM

## 2024-01-16 ENCOUNTER — HOSPITAL ENCOUNTER (OUTPATIENT)
Dept: CARDIOLOGY | Facility: CLINIC | Age: 74
Discharge: HOME | End: 2024-01-16
Payer: MEDICARE

## 2024-01-16 DIAGNOSIS — Z95.818 IMPLANTABLE LOOP RECORDER PRESENT: ICD-10-CM

## 2024-01-16 DIAGNOSIS — I48.0 PAROXYSMAL ATRIAL FIBRILLATION (MULTI): ICD-10-CM

## 2024-01-22 ENCOUNTER — HOSPITAL ENCOUNTER (OUTPATIENT)
Dept: CARDIOLOGY | Facility: CLINIC | Age: 74
Discharge: HOME | End: 2024-01-22
Payer: MEDICARE

## 2024-01-22 ENCOUNTER — OFFICE VISIT (OUTPATIENT)
Dept: PRIMARY CARE | Facility: CLINIC | Age: 74
End: 2024-01-22
Payer: MEDICARE

## 2024-01-22 VITALS
WEIGHT: 261 LBS | OXYGEN SATURATION: 93 % | HEIGHT: 63 IN | BODY MASS INDEX: 46.25 KG/M2 | TEMPERATURE: 97.6 F | SYSTOLIC BLOOD PRESSURE: 126 MMHG | RESPIRATION RATE: 18 BRPM | DIASTOLIC BLOOD PRESSURE: 71 MMHG | HEART RATE: 105 BPM

## 2024-01-22 DIAGNOSIS — Z95.818 IMPLANTABLE LOOP RECORDER PRESENT: ICD-10-CM
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
## 2024-01-22 DIAGNOSIS — R60.9 DEPENDENT EDEMA: Primary | ICD-10-CM

## 2024-01-22 DIAGNOSIS — I48.0 PAROXYSMAL ATRIAL FIBRILLATION (MULTI): ICD-10-CM

## 2024-01-22 DIAGNOSIS — I51.9 MILD DIASTOLIC DYSFUNCTION: ICD-10-CM

## 2024-01-22 PROBLEM — I11.0 HYPERTENSIVE HEART DISEASE WITH HEART FAILURE (MULTI): Status: RESOLVED | Noted: 2023-04-16 | Resolved: 2024-01-22

## 2024-01-22 PROBLEM — I50.9 CHF (CONGESTIVE HEART FAILURE) (MULTI): Status: RESOLVED | Noted: 2023-10-30 | Resolved: 2024-01-22

## 2024-01-22 PROCEDURE — 1125F AMNT PAIN NOTED PAIN PRSNT: CPT

## 2024-01-22 PROCEDURE — 99214 OFFICE O/P EST MOD 30 MIN: CPT

## 2024-01-22 PROCEDURE — 3074F SYST BP LT 130 MM HG: CPT

## 2024-01-22 PROCEDURE — 1159F MED LIST DOCD IN RCRD: CPT

## 2024-01-22 PROCEDURE — 3078F DIAST BP <80 MM HG: CPT

## 2024-01-22 PROCEDURE — 1036F TOBACCO NON-USER: CPT

## 2024-01-22 PROCEDURE — 1160F RVW MEDS BY RX/DR IN RCRD: CPT

## 2024-01-22 RX ORDER — FUROSEMIDE 20 MG/1
20 TABLET ORAL 2 TIMES DAILY PRN
Qty: 60 TABLET | Refills: 1 | Status: SHIPPED | OUTPATIENT
Start: 2024-01-22 | End: 2025-01-21

## 2024-01-22 RX ORDER — DOXYCYCLINE 100 MG/1
100 CAPSULE ORAL 2 TIMES DAILY
Qty: 10 CAPSULE | Refills: 0 | Status: SHIPPED | OUTPATIENT
Start: 2024-01-22 | End: 2024-01-27

## 2024-01-22 NOTE — PROGRESS NOTES
"Subjective   Helen Johnston is a 73 y.o. female who presents for Edema (Bilateral lower leg edema and she states it is \"wheeping\". She has clear fluid coming out.).  Patient reports that her bilateral lower legs have been leaking some clear serous fluid recently.  She reports mild chronic bilateral lower leg swelling however it is not worse recently.  Patient does have a 3-month-old puppy at home that is very frequently nipping or scratching at her legs and hands as well.  She does not feel that any of her wounds are infected currently however she does have a history of MRSA.  She is supposed to take Lasix 40 mg by mouth twice daily as needed for leg swelling however she has not been taking it at all because she does not like how fast and how much it makes her urinate.  She wears her compression socks at night sometimes.  She denies chest pain, shortness of breath, palpitations.  She does have a loop recorder currently which has not showed anything major recently.  She did see her cardiologist Dr. Belcher last week and he said that everything was fine.  Her last echocardiogram was in 2022 and her left ventricular systolic function was normal with a 60 to 65% estimated ejection fraction.  Pseudo normal pattern of left ventricular diastolic filling.  Mitral annular calcification noted.  RVSP normal.  She follows with cardiology, Dr. Belcher, and also electrophysiology, Dr. Rojas.    Objective   /71 (BP Location: Right arm, Patient Position: Sitting)   Pulse 105   Temp 36.4 °C (97.6 °F)   Resp 18   Ht 1.588 m (5' 2.5\")   Wt 118 kg (261 lb)   SpO2 93%   BMI 46.98 kg/m²    PHYSICAL EXAM  Gen: Well appearing, in NAD  Eyes: EOMI  Heart: RRR, no murmurs  Lungs: No increased work of breathing, CTAB, on RA  Extremities: WWP, cap refill <2sec, 1+ pitting edema of bilateral lower legs up to the knee, there is mild amount of clear serous fluid seeping from posterior calf, venous stasis dermatitis  Neuro: Alert, " symmetrical facies, moves all extremities equally  Psych: Appropriate mood and affect    Assessment/Plan     Problem List Items Addressed This Visit       Dependent edema - Primary     Patient does not like the Lasix 40 mg because it makes her urinate too much and too fast and she will have accidents so we will try Lasix 20 mg as needed for leg swelling.  She was also encouraged to wear her compression socks as often as she can.  Will also send her to the wound clinic to help with her dressings and compression socks.  Recommended that she follow-up with her cardiologist and electrophysiologist.         Relevant Medications    furosemide (Lasix) 20 mg tablet    doxycycline (Vibramycin) 100 mg capsule    Other Relevant Orders    Referral to Wound Clinic    Mild diastolic dysfunction    Dog bite     Patient's minor lower leg dog scrapes/bites do not look infected at this time however patient has a history of MRSA so will treat preventatively with a 5-day course of doxycycline just in case since dogs carry lots of germs.           Elsie Ziegler DO  Family Medicine Resident, PGY-3  [facility]  [address] [place] [address] [phone]

## 2024-01-22 NOTE — PROGRESS NOTES
I saw and evaluated the patient. I personally obtained the key and critical portions of the history and physical exam or was physically present for key and critical portions performed by the resident. I reviewed the resident/fellow's documentation and discussed the patient with the resident/fellow. I agree with the resident/fellow's medical decision making as documented in the note.  Patient states for some time she has had bilateral lower extremity edema.  She was given Lasix 20 twice daily.  She is afraid that make her pee too much.  Will switch it to just 20 as needed.  Patient does have some drainage.  Patient states her dog's been picking at it.  Due to this.  Started on antibiotic and have her see the wound center.  Patient aware of side effects of medication.  Patient aware if any chest pain shortness of breath any nausea vomiting diarrhea fever headache any concerning symptoms go to ER.  If increased redness or red streaks go to the ER  Side effects medication Splane  Patient is to not allow the dog to nipple and lick her wound  Follow-up in 1 week  Agree with assessment and plan    Burt Elizabeth, DO     99.1

## 2024-01-22 NOTE — ASSESSMENT & PLAN NOTE
Patient does not like the Lasix 40 mg because it makes her urinate too much and too fast and she will have accidents so we will try Lasix 20 mg as needed for leg swelling.  She was also encouraged to wear her compression socks as often as she can.  Will also send her to the wound clinic to help with her dressings and compression socks.  Recommended that she follow-up with her cardiologist and electrophysiologist.

## 2024-01-22 NOTE — ASSESSMENT & PLAN NOTE
Patient's minor lower leg dog scrapes/bites do not look infected at this time however patient has a history of MRSA so will treat preventatively with a 5-day course of doxycycline just in case since dogs carry lots of germs.

## 2024-01-23 ENCOUNTER — HOSPITAL ENCOUNTER (OUTPATIENT)
Dept: CARDIOLOGY | Facility: CLINIC | Age: 74
Discharge: HOME | End: 2024-01-23
Payer: MEDICARE

## 2024-01-23 DIAGNOSIS — I48.0 PAROXYSMAL ATRIAL FIBRILLATION (MULTI): ICD-10-CM

## 2024-01-23 DIAGNOSIS — Z95.818 IMPLANTABLE LOOP RECORDER PRESENT: ICD-10-CM

## 2024-01-24 ENCOUNTER — HOSPITAL ENCOUNTER (OUTPATIENT)
Dept: CARDIOLOGY | Facility: CLINIC | Age: 74
Discharge: HOME | End: 2024-01-24
Payer: MEDICARE

## 2024-01-24 DIAGNOSIS — Z95.818 IMPLANTABLE LOOP RECORDER PRESENT: ICD-10-CM

## 2024-01-24 DIAGNOSIS — I48.0 PAROXYSMAL ATRIAL FIBRILLATION (MULTI): ICD-10-CM

## 2024-01-25 ENCOUNTER — OFFICE VISIT (OUTPATIENT)
Dept: WOUND CARE | Facility: CLINIC | Age: 74
End: 2024-01-25
Payer: MEDICARE

## 2024-01-25 ENCOUNTER — HOSPITAL ENCOUNTER (OUTPATIENT)
Dept: CARDIOLOGY | Facility: CLINIC | Age: 74
Discharge: HOME | End: 2024-01-25
Payer: MEDICARE

## 2024-01-25 DIAGNOSIS — Z95.818 IMPLANTABLE LOOP RECORDER PRESENT: ICD-10-CM

## 2024-01-25 DIAGNOSIS — I48.0 PAROXYSMAL ATRIAL FIBRILLATION (MULTI): ICD-10-CM

## 2024-01-25 PROCEDURE — 29581 APPL MULTLAYER CMPRN SYS LEG: CPT

## 2024-01-25 PROCEDURE — 99213 OFFICE O/P EST LOW 20 MIN: CPT | Performed by: PLASTIC SURGERY

## 2024-01-25 PROCEDURE — 29581 APPL MULTLAYER CMPRN SYS LEG: CPT | Performed by: PLASTIC SURGERY

## 2024-01-25 PROCEDURE — 99214 OFFICE O/P EST MOD 30 MIN: CPT | Mod: 25

## 2024-01-29 ENCOUNTER — CLINICAL SUPPORT (OUTPATIENT)
Dept: WOUND CARE | Facility: CLINIC | Age: 74
End: 2024-01-29
Payer: MEDICARE

## 2024-01-29 ENCOUNTER — HOSPITAL ENCOUNTER (OUTPATIENT)
Dept: CARDIOLOGY | Facility: CLINIC | Age: 74
Discharge: HOME | End: 2024-01-29
Payer: MEDICARE

## 2024-01-29 DIAGNOSIS — Z95.818 IMPLANTABLE LOOP RECORDER PRESENT: ICD-10-CM

## 2024-01-29 DIAGNOSIS — I48.0 PAROXYSMAL ATRIAL FIBRILLATION (MULTI): ICD-10-CM

## 2024-01-29 PROCEDURE — 93298 REM INTERROG DEV EVAL SCRMS: CPT | Performed by: STUDENT IN AN ORGANIZED HEALTH CARE EDUCATION/TRAINING PROGRAM

## 2024-01-29 PROCEDURE — 29581 APPL MULTLAYER CMPRN SYS LEG: CPT

## 2024-01-29 PROCEDURE — 93298 REM INTERROG DEV EVAL SCRMS: CPT

## 2024-01-30 ENCOUNTER — HOSPITAL ENCOUNTER (OUTPATIENT)
Dept: CARDIOLOGY | Facility: CLINIC | Age: 74
Discharge: HOME | End: 2024-01-30
Payer: MEDICARE

## 2024-01-30 DIAGNOSIS — Z95.818 IMPLANTABLE LOOP RECORDER PRESENT: ICD-10-CM

## 2024-01-30 DIAGNOSIS — I48.0 PAROXYSMAL ATRIAL FIBRILLATION (MULTI): ICD-10-CM

## 2024-01-31 ENCOUNTER — HOSPITAL ENCOUNTER (OUTPATIENT)
Dept: CARDIOLOGY | Facility: CLINIC | Age: 74
Discharge: HOME | End: 2024-01-31
Payer: MEDICARE

## 2024-01-31 DIAGNOSIS — Z95.818 IMPLANTABLE LOOP RECORDER PRESENT: ICD-10-CM

## 2024-01-31 DIAGNOSIS — I48.0 PAROXYSMAL ATRIAL FIBRILLATION (MULTI): ICD-10-CM

## 2024-02-01 ENCOUNTER — OFFICE VISIT (OUTPATIENT)
Dept: WOUND CARE | Facility: CLINIC | Age: 74
End: 2024-02-01
Payer: MEDICARE

## 2024-02-01 PROCEDURE — 99212 OFFICE O/P EST SF 10 MIN: CPT | Mod: 25

## 2024-02-05 ENCOUNTER — HOSPITAL ENCOUNTER (OUTPATIENT)
Dept: CARDIOLOGY | Facility: CLINIC | Age: 74
Discharge: HOME | End: 2024-02-05
Payer: MEDICARE

## 2024-02-05 DIAGNOSIS — I48.0 PAROXYSMAL ATRIAL FIBRILLATION (MULTI): ICD-10-CM

## 2024-02-05 DIAGNOSIS — Z95.818 IMPLANTABLE LOOP RECORDER PRESENT: ICD-10-CM

## 2024-02-06 ENCOUNTER — TELEPHONE (OUTPATIENT)
Dept: PRIMARY CARE | Facility: CLINIC | Age: 74
End: 2024-02-06
Payer: MEDICARE

## 2024-02-06 ENCOUNTER — HOSPITAL ENCOUNTER (OUTPATIENT)
Dept: CARDIOLOGY | Facility: CLINIC | Age: 74
Discharge: HOME | End: 2024-02-06
Payer: MEDICARE

## 2024-02-06 DIAGNOSIS — I48.0 PAROXYSMAL ATRIAL FIBRILLATION (MULTI): ICD-10-CM

## 2024-02-06 DIAGNOSIS — Z95.818 IMPLANTABLE LOOP RECORDER PRESENT: ICD-10-CM

## 2024-02-07 ENCOUNTER — HOSPITAL ENCOUNTER (OUTPATIENT)
Dept: CARDIOLOGY | Facility: CLINIC | Age: 74
Discharge: HOME | End: 2024-02-07
Payer: MEDICARE

## 2024-02-07 DIAGNOSIS — Z95.818 IMPLANTABLE LOOP RECORDER PRESENT: ICD-10-CM

## 2024-02-07 DIAGNOSIS — I48.0 PAROXYSMAL ATRIAL FIBRILLATION (MULTI): ICD-10-CM

## 2024-02-08 ENCOUNTER — HOSPITAL ENCOUNTER (OUTPATIENT)
Dept: CARDIOLOGY | Facility: CLINIC | Age: 74
Discharge: HOME | End: 2024-02-08
Payer: MEDICARE

## 2024-02-08 DIAGNOSIS — I48.0 PAROXYSMAL ATRIAL FIBRILLATION (MULTI): ICD-10-CM

## 2024-02-08 DIAGNOSIS — Z95.818 IMPLANTABLE LOOP RECORDER PRESENT: ICD-10-CM

## 2024-02-08 RX ORDER — DOFETILIDE 0.5 MG/1
500 CAPSULE ORAL 2 TIMES DAILY
Qty: 180 CAPSULE | Refills: 3 | Status: SHIPPED | OUTPATIENT
Start: 2024-02-08 | End: 2024-05-09 | Stop reason: SDUPTHER

## 2024-02-12 ENCOUNTER — HOSPITAL ENCOUNTER (OUTPATIENT)
Dept: CARDIOLOGY | Facility: CLINIC | Age: 74
Discharge: HOME | End: 2024-02-12
Payer: MEDICARE

## 2024-02-12 DIAGNOSIS — Z95.818 IMPLANTABLE LOOP RECORDER PRESENT: ICD-10-CM

## 2024-02-12 DIAGNOSIS — I48.0 PAROXYSMAL ATRIAL FIBRILLATION (MULTI): ICD-10-CM

## 2024-02-12 DIAGNOSIS — Z95.818 IMPLANTABLE LOOP RECORDER PRESENT: Primary | ICD-10-CM

## 2024-02-13 ENCOUNTER — HOSPITAL ENCOUNTER (OUTPATIENT)
Dept: CARDIOLOGY | Facility: CLINIC | Age: 74
Discharge: HOME | End: 2024-02-13
Payer: MEDICARE

## 2024-02-13 DIAGNOSIS — Z95.818 IMPLANTABLE LOOP RECORDER PRESENT: ICD-10-CM

## 2024-02-13 DIAGNOSIS — I48.0 PAROXYSMAL ATRIAL FIBRILLATION (MULTI): ICD-10-CM

## 2024-02-15 ENCOUNTER — HOSPITAL ENCOUNTER (OUTPATIENT)
Dept: CARDIOLOGY | Facility: CLINIC | Age: 74
Discharge: HOME | End: 2024-02-15
Payer: MEDICARE

## 2024-02-15 DIAGNOSIS — I48.0 PAROXYSMAL ATRIAL FIBRILLATION (MULTI): ICD-10-CM

## 2024-02-15 DIAGNOSIS — Z95.818 IMPLANTABLE LOOP RECORDER PRESENT: ICD-10-CM

## 2024-02-19 ENCOUNTER — HOSPITAL ENCOUNTER (OUTPATIENT)
Dept: CARDIOLOGY | Facility: CLINIC | Age: 74
Discharge: HOME | End: 2024-02-19
Payer: MEDICARE

## 2024-02-19 DIAGNOSIS — I48.0 PAROXYSMAL ATRIAL FIBRILLATION (MULTI): ICD-10-CM

## 2024-02-19 DIAGNOSIS — Z95.818 IMPLANTABLE LOOP RECORDER PRESENT: ICD-10-CM

## 2024-02-21 ENCOUNTER — HOSPITAL ENCOUNTER (OUTPATIENT)
Dept: CARDIOLOGY | Facility: CLINIC | Age: 74
Discharge: HOME | End: 2024-02-21
Payer: MEDICARE

## 2024-02-21 DIAGNOSIS — I48.0 PAROXYSMAL ATRIAL FIBRILLATION (MULTI): ICD-10-CM

## 2024-02-21 DIAGNOSIS — Z95.818 IMPLANTABLE LOOP RECORDER PRESENT: ICD-10-CM

## 2024-02-22 ENCOUNTER — HOSPITAL ENCOUNTER (OUTPATIENT)
Dept: CARDIOLOGY | Facility: CLINIC | Age: 74
Discharge: HOME | End: 2024-02-22
Payer: MEDICARE

## 2024-02-22 DIAGNOSIS — Z95.818 IMPLANTABLE LOOP RECORDER PRESENT: ICD-10-CM

## 2024-02-22 DIAGNOSIS — I48.0 PAROXYSMAL ATRIAL FIBRILLATION (MULTI): ICD-10-CM

## 2024-02-26 ENCOUNTER — HOSPITAL ENCOUNTER (OUTPATIENT)
Dept: CARDIOLOGY | Facility: CLINIC | Age: 74
Discharge: HOME | End: 2024-02-26
Payer: MEDICARE

## 2024-02-26 DIAGNOSIS — I48.0 PAROXYSMAL ATRIAL FIBRILLATION (MULTI): ICD-10-CM

## 2024-02-26 DIAGNOSIS — Z95.818 IMPLANTABLE LOOP RECORDER PRESENT: ICD-10-CM

## 2024-02-27 ENCOUNTER — HOSPITAL ENCOUNTER (OUTPATIENT)
Dept: CARDIOLOGY | Facility: CLINIC | Age: 74
Discharge: HOME | End: 2024-02-27
Payer: MEDICARE

## 2024-02-27 DIAGNOSIS — Z95.818 IMPLANTABLE LOOP RECORDER PRESENT: ICD-10-CM

## 2024-02-27 DIAGNOSIS — I48.0 PAROXYSMAL ATRIAL FIBRILLATION (MULTI): ICD-10-CM

## 2024-02-28 ENCOUNTER — HOSPITAL ENCOUNTER (OUTPATIENT)
Dept: CARDIOLOGY | Facility: CLINIC | Age: 74
Discharge: HOME | End: 2024-02-28
Payer: MEDICARE

## 2024-02-28 DIAGNOSIS — Z95.818 IMPLANTABLE LOOP RECORDER PRESENT: ICD-10-CM

## 2024-02-28 DIAGNOSIS — I48.0 PAROXYSMAL ATRIAL FIBRILLATION (MULTI): ICD-10-CM

## 2024-03-04 ENCOUNTER — HOSPITAL ENCOUNTER (OUTPATIENT)
Dept: CARDIOLOGY | Facility: CLINIC | Age: 74
Discharge: HOME | End: 2024-03-04
Payer: MEDICARE

## 2024-03-04 DIAGNOSIS — I48.0 PAROXYSMAL ATRIAL FIBRILLATION (MULTI): ICD-10-CM

## 2024-03-04 DIAGNOSIS — Z95.818 IMPLANTABLE LOOP RECORDER PRESENT: ICD-10-CM

## 2024-03-04 PROCEDURE — 93298 REM INTERROG DEV EVAL SCRMS: CPT

## 2024-03-04 PROCEDURE — 93298 REM INTERROG DEV EVAL SCRMS: CPT | Performed by: STUDENT IN AN ORGANIZED HEALTH CARE EDUCATION/TRAINING PROGRAM

## 2024-03-06 ENCOUNTER — HOSPITAL ENCOUNTER (OUTPATIENT)
Dept: CARDIOLOGY | Facility: CLINIC | Age: 74
Discharge: HOME | End: 2024-03-06
Payer: MEDICARE

## 2024-03-06 DIAGNOSIS — I48.0 PAROXYSMAL ATRIAL FIBRILLATION (MULTI): ICD-10-CM

## 2024-03-06 DIAGNOSIS — Z95.818 IMPLANTABLE LOOP RECORDER PRESENT: ICD-10-CM

## 2024-03-07 ENCOUNTER — HOSPITAL ENCOUNTER (OUTPATIENT)
Dept: CARDIOLOGY | Facility: CLINIC | Age: 74
Discharge: HOME | End: 2024-03-07
Payer: MEDICARE

## 2024-03-07 DIAGNOSIS — Z95.818 IMPLANTABLE LOOP RECORDER PRESENT: ICD-10-CM

## 2024-03-07 DIAGNOSIS — I48.0 PAROXYSMAL ATRIAL FIBRILLATION (MULTI): ICD-10-CM

## 2024-03-11 ENCOUNTER — HOSPITAL ENCOUNTER (OUTPATIENT)
Dept: CARDIOLOGY | Facility: CLINIC | Age: 74
Discharge: HOME | End: 2024-03-11
Payer: MEDICARE

## 2024-03-11 DIAGNOSIS — I48.0 PAROXYSMAL ATRIAL FIBRILLATION (MULTI): ICD-10-CM

## 2024-03-11 DIAGNOSIS — Z95.818 IMPLANTABLE LOOP RECORDER PRESENT: ICD-10-CM

## 2024-03-14 ENCOUNTER — HOSPITAL ENCOUNTER (OUTPATIENT)
Dept: CARDIOLOGY | Facility: CLINIC | Age: 74
Discharge: HOME | End: 2024-03-14
Payer: MEDICARE

## 2024-03-14 DIAGNOSIS — I48.0 PAROXYSMAL ATRIAL FIBRILLATION (MULTI): ICD-10-CM

## 2024-03-14 DIAGNOSIS — Z95.818 IMPLANTABLE LOOP RECORDER PRESENT: ICD-10-CM

## 2024-03-18 ENCOUNTER — HOSPITAL ENCOUNTER (OUTPATIENT)
Dept: CARDIOLOGY | Facility: CLINIC | Age: 74
Discharge: HOME | End: 2024-03-18
Payer: MEDICARE

## 2024-03-18 DIAGNOSIS — I48.0 PAROXYSMAL ATRIAL FIBRILLATION (MULTI): ICD-10-CM

## 2024-03-18 DIAGNOSIS — Z95.818 IMPLANTABLE LOOP RECORDER PRESENT: ICD-10-CM

## 2024-03-20 ENCOUNTER — TELEPHONE (OUTPATIENT)
Dept: CARDIOLOGY | Facility: CLINIC | Age: 74
End: 2024-03-20
Payer: MEDICARE

## 2024-03-20 ENCOUNTER — HOSPITAL ENCOUNTER (OUTPATIENT)
Dept: CARDIOLOGY | Facility: CLINIC | Age: 74
Discharge: HOME | End: 2024-03-20
Payer: MEDICARE

## 2024-03-20 DIAGNOSIS — Z95.818 IMPLANTABLE LOOP RECORDER PRESENT: ICD-10-CM

## 2024-03-20 DIAGNOSIS — I48.0 PAROXYSMAL ATRIAL FIBRILLATION (MULTI): ICD-10-CM

## 2024-03-20 NOTE — TELEPHONE ENCOUNTER
[id number] [address] [address] AT [facility]  Phone: [phone]   Fax: [phone]   rivaroxaban (Xarelto) 20 mg tablet [[id number]]    Order Details  Dose: 20 mg Route: oral Frequency: Daily with evening meal   90 days worth

## 2024-03-25 ENCOUNTER — HOSPITAL ENCOUNTER (OUTPATIENT)
Dept: CARDIOLOGY | Facility: CLINIC | Age: 74
Discharge: HOME | End: 2024-03-25
Payer: MEDICARE

## 2024-03-25 DIAGNOSIS — I48.0 PAROXYSMAL ATRIAL FIBRILLATION (MULTI): ICD-10-CM

## 2024-03-25 DIAGNOSIS — Z95.818 IMPLANTABLE LOOP RECORDER PRESENT: ICD-10-CM

## 2024-04-08 ENCOUNTER — HOSPITAL ENCOUNTER (OUTPATIENT)
Dept: CARDIOLOGY | Facility: CLINIC | Age: 74
Discharge: HOME | End: 2024-04-08
Payer: MEDICARE

## 2024-04-08 DIAGNOSIS — I48.0 PAROXYSMAL ATRIAL FIBRILLATION (MULTI): ICD-10-CM

## 2024-04-08 DIAGNOSIS — Z95.818 IMPLANTABLE LOOP RECORDER PRESENT: ICD-10-CM

## 2024-04-08 PROCEDURE — 93298 REM INTERROG DEV EVAL SCRMS: CPT

## 2024-04-08 PROCEDURE — 93298 REM INTERROG DEV EVAL SCRMS: CPT | Performed by: STUDENT IN AN ORGANIZED HEALTH CARE EDUCATION/TRAINING PROGRAM

## 2024-04-09 ENCOUNTER — HOSPITAL ENCOUNTER (OUTPATIENT)
Dept: CARDIOLOGY | Facility: CLINIC | Age: 74
Discharge: HOME | End: 2024-04-09
Payer: MEDICARE

## 2024-04-09 DIAGNOSIS — Z95.818 IMPLANTABLE LOOP RECORDER PRESENT: ICD-10-CM

## 2024-04-09 DIAGNOSIS — I48.0 PAROXYSMAL ATRIAL FIBRILLATION (MULTI): ICD-10-CM

## 2024-04-11 ENCOUNTER — HOSPITAL ENCOUNTER (OUTPATIENT)
Dept: CARDIOLOGY | Facility: CLINIC | Age: 74
Discharge: HOME | End: 2024-04-11
Payer: MEDICARE

## 2024-04-11 DIAGNOSIS — I48.0 PAROXYSMAL ATRIAL FIBRILLATION (MULTI): ICD-10-CM

## 2024-04-11 DIAGNOSIS — Z95.818 IMPLANTABLE LOOP RECORDER PRESENT: ICD-10-CM

## 2024-04-15 ENCOUNTER — HOSPITAL ENCOUNTER (OUTPATIENT)
Dept: CARDIOLOGY | Facility: CLINIC | Age: 74
Discharge: HOME | End: 2024-04-15
Payer: MEDICARE

## 2024-04-15 DIAGNOSIS — I48.0 PAROXYSMAL ATRIAL FIBRILLATION (MULTI): ICD-10-CM

## 2024-04-15 DIAGNOSIS — Z95.818 IMPLANTABLE LOOP RECORDER PRESENT: ICD-10-CM

## 2024-04-17 ENCOUNTER — HOSPITAL ENCOUNTER (OUTPATIENT)
Dept: CARDIOLOGY | Facility: CLINIC | Age: 74
Discharge: HOME | End: 2024-04-17
Payer: MEDICARE

## 2024-04-17 DIAGNOSIS — I48.0 PAROXYSMAL ATRIAL FIBRILLATION (MULTI): ICD-10-CM

## 2024-04-17 DIAGNOSIS — Z95.818 IMPLANTABLE LOOP RECORDER PRESENT: ICD-10-CM

## 2024-04-18 PROBLEM — I50.32 CHRONIC DIASTOLIC HEART FAILURE (MULTI): Status: ACTIVE | Noted: 2022-12-02

## 2024-04-22 ENCOUNTER — HOSPITAL ENCOUNTER (OUTPATIENT)
Dept: CARDIOLOGY | Facility: CLINIC | Age: 74
Discharge: HOME | End: 2024-04-22
Payer: MEDICARE

## 2024-04-22 DIAGNOSIS — I48.0 PAROXYSMAL ATRIAL FIBRILLATION (MULTI): ICD-10-CM

## 2024-04-22 DIAGNOSIS — Z95.818 IMPLANTABLE LOOP RECORDER PRESENT: ICD-10-CM

## 2024-04-23 ENCOUNTER — HOSPITAL ENCOUNTER (OUTPATIENT)
Dept: CARDIOLOGY | Facility: CLINIC | Age: 74
Discharge: HOME | End: 2024-04-23
Payer: MEDICARE

## 2024-04-23 DIAGNOSIS — I48.0 PAROXYSMAL ATRIAL FIBRILLATION (MULTI): ICD-10-CM

## 2024-04-23 DIAGNOSIS — Z95.818 IMPLANTABLE LOOP RECORDER PRESENT: ICD-10-CM

## 2024-04-24 ENCOUNTER — HOSPITAL ENCOUNTER (OUTPATIENT)
Dept: CARDIOLOGY | Facility: CLINIC | Age: 74
Discharge: HOME | End: 2024-04-24
Payer: MEDICARE

## 2024-04-24 DIAGNOSIS — Z95.818 IMPLANTABLE LOOP RECORDER PRESENT: ICD-10-CM

## 2024-04-24 DIAGNOSIS — I48.0 PAROXYSMAL ATRIAL FIBRILLATION (MULTI): ICD-10-CM

## 2024-04-29 ENCOUNTER — HOSPITAL ENCOUNTER (OUTPATIENT)
Dept: CARDIOLOGY | Facility: CLINIC | Age: 74
Discharge: HOME | End: 2024-04-29
Payer: MEDICARE

## 2024-04-29 DIAGNOSIS — I48.0 PAROXYSMAL ATRIAL FIBRILLATION (MULTI): ICD-10-CM

## 2024-04-29 DIAGNOSIS — Z95.818 IMPLANTABLE LOOP RECORDER PRESENT: ICD-10-CM

## 2024-05-06 ENCOUNTER — HOSPITAL ENCOUNTER (OUTPATIENT)
Dept: CARDIOLOGY | Facility: CLINIC | Age: 74
Discharge: HOME | End: 2024-05-06
Payer: MEDICARE

## 2024-05-06 DIAGNOSIS — I48.0 PAROXYSMAL ATRIAL FIBRILLATION (MULTI): ICD-10-CM

## 2024-05-06 DIAGNOSIS — Z95.818 IMPLANTABLE LOOP RECORDER PRESENT: ICD-10-CM

## 2024-05-09 ENCOUNTER — OFFICE VISIT (OUTPATIENT)
Dept: CARDIOLOGY | Facility: CLINIC | Age: 74
End: 2024-05-09
Payer: MEDICARE

## 2024-05-09 VITALS — DIASTOLIC BLOOD PRESSURE: 72 MMHG | OXYGEN SATURATION: 95 % | HEART RATE: 93 BPM | SYSTOLIC BLOOD PRESSURE: 108 MMHG

## 2024-05-09 DIAGNOSIS — E66.01 OBESITY, CLASS III, BMI 40-49.9 (MORBID OBESITY) (MULTI): ICD-10-CM

## 2024-05-09 DIAGNOSIS — I48.0 PAROXYSMAL ATRIAL FIBRILLATION (MULTI): Primary | ICD-10-CM

## 2024-05-09 DIAGNOSIS — G47.30 SLEEP APNEA, UNSPECIFIED TYPE: ICD-10-CM

## 2024-05-09 DIAGNOSIS — Z51.81 VISIT FOR MONITORING TIKOSYN THERAPY: ICD-10-CM

## 2024-05-09 DIAGNOSIS — I10 BENIGN ESSENTIAL HYPERTENSION: ICD-10-CM

## 2024-05-09 DIAGNOSIS — Z79.899 VISIT FOR MONITORING TIKOSYN THERAPY: ICD-10-CM

## 2024-05-09 PROCEDURE — 93000 ELECTROCARDIOGRAM COMPLETE: CPT | Performed by: STUDENT IN AN ORGANIZED HEALTH CARE EDUCATION/TRAINING PROGRAM

## 2024-05-09 PROCEDURE — 99215 OFFICE O/P EST HI 40 MIN: CPT | Performed by: STUDENT IN AN ORGANIZED HEALTH CARE EDUCATION/TRAINING PROGRAM

## 2024-05-09 PROCEDURE — 1159F MED LIST DOCD IN RCRD: CPT | Performed by: STUDENT IN AN ORGANIZED HEALTH CARE EDUCATION/TRAINING PROGRAM

## 2024-05-09 PROCEDURE — 3078F DIAST BP <80 MM HG: CPT | Performed by: STUDENT IN AN ORGANIZED HEALTH CARE EDUCATION/TRAINING PROGRAM

## 2024-05-09 PROCEDURE — 3074F SYST BP LT 130 MM HG: CPT | Performed by: STUDENT IN AN ORGANIZED HEALTH CARE EDUCATION/TRAINING PROGRAM

## 2024-05-09 RX ORDER — METOPROLOL SUCCINATE 50 MG/1
50 TABLET, EXTENDED RELEASE ORAL NIGHTLY
Qty: 90 TABLET | Refills: 3 | Status: SHIPPED | OUTPATIENT
Start: 2024-05-09 | End: 2025-05-09

## 2024-05-09 RX ORDER — DOFETILIDE 0.5 MG/1
500 CAPSULE ORAL 2 TIMES DAILY
Qty: 180 CAPSULE | Refills: 3 | Status: SHIPPED | OUTPATIENT
Start: 2024-05-09 | End: 2025-05-09

## 2024-05-09 NOTE — PROGRESS NOTES
Cardiac Electrophysiology Office Visit     Referred by Otto Avendano MD for   Chief Complaint   Patient presents with    Atrial Fibrillation     HPI:  Helen Johnston is a 73 y.o. year old female patient with h/o hypertension, diastolic heart failure, diabetes, HLD, JENI (not complaint with CPAP) paroxysmal atrial fibrillation presenting today for follow up    PMHx/PSHx: As above  Tobacco Denies, Alcohol Social, Caffeine use   1 cups of tea / day, Drug use  Denies    Objective  Allergies   Allergen Reactions    Lisinopril Anaphylaxis    Thiopental Shortness of breath    Vancomycin Shortness of breath    Cefuroxime Unknown    Erythromycin Swelling and Rash    Linezolid Itching and Swelling    Procainamide Other      Heart racing with dental procedure    Procaine Unknown    Silver Sulfadiazine Swelling and Other     SKIN BURNING      Current Outpatient Medications   Medication Instructions    albuterol 90 mcg/actuation inhaler 2 puffs, inhalation, Every 6 hours PRN    albuterol 5 mg, inhalation    atorvastatin (LIPITOR) 20 mg, oral, Daily    bacitracin ophthalmic ointment APPLY 1/4 INCH TO BOTH EYES AT BEDTIME    cetirizine (ZYRTEC) 10 mg capsule oral    cholecalciferol (VITAMIN D-3) 50,000 Units, oral, Weekly    cyclobenzaprine (FLEXERIL) 5 mg, oral, Every 6 hours PRN    dofetilide (TIKOSYN) 500 mcg, oral, 2 times daily    dyclonine (Sucrets) 2 mg lozenge 1 lozenge, mucous membrane    furosemide (LASIX) 20 mg, oral, 2 times daily PRN    hydrocortisone-iodoquinoL (Dermazene) 1-1 % cream in packet Topical    ibuprofen 600 mg, oral, Every 6 hours PRN    levothyroxine (SYNTHROID, LEVOXYL) 50 mcg, oral, Daily    losartan (COZAAR) 25 mg, oral, Nightly    loteprednol (Lotemax) 0.5 % ophthalmic suspension 1 drop    meloxicam (MOBIC) 7.5 mg, oral, Daily RT    metoprolol succinate XL (TOPROL-XL) 50 mg, oral, Nightly    miSOPROStoL (CYTOTEC) 200 mcg    neomycin-bacitracin-polymyxin-hydrocortisone (Cortisporin)  3.5-400-10,000 mg-unit/g-1% ophthalmic ointment ophthalmic (eye)    nystatin (Mycostatin) 100,000 unit/gram powder Topical, Apply to affected areas 2-3 times daily    olopatadine (Patanol) 0.1 % ophthalmic solution ophthalmic (eye), Every 12 hours    pantoprazole (PROTONIX) 40 mg, oral, Daily before breakfast    peg 400-propylene glycol (Systane, propylene glycoL,) 0.4-0.3 % drops ophthalmic drops 1 each    prednisoLONE acetate (Pred-Forte) 1 % ophthalmic suspension ophthalmic (eye), 2 times daily PRN    rivaroxaban (XARELTO) 20 mg, oral, Daily with evening meal    spironolactone (ALDACTONE) 25 mg, oral, Daily    tobramycin-dexamethasone (Tobradex) ophthalmic ointment Place into both eyes nightly Apply to both eyes nightly.    triamcinolone (Kenalog) 0.1 % ointment Topical    urea (Carmol) 40 % cream Topical         Visit Vitals  /72 (BP Location: Right arm, Patient Position: Sitting)   Pulse 93   SpO2 95%   Smoking Status Never        Physical Exam  Vitals reviewed.   Constitutional:       Appearance: Normal appearance.   HENT:      Head: Normocephalic.   Cardiovascular:      Rate and Rhythm: Normal rate and regular rhythm.   Pulmonary:      Effort: Pulmonary effort is normal. No respiratory distress.      Breath sounds: No wheezing.   Skin:     General: Skin is warm and dry.      Capillary Refill: Capillary refill takes less than 2 seconds.   Neurological:      Mental Status: She is alert.   Psychiatric:         Mood and Affect: Mood normal.      My Interpretation of Reviewed Study(s):  Echo (June 2022): Normal left ventricular systolic function with an EF of 60 to 65%.  Mildly dilated left atrium with a right atrium upper limit of normal.  No pericardial effusion noted.  [id number] Score  Age 65-74: 1   Sex Female: 1   CHF History Yes: 1   HTN Yes: 1   Stroke/TIA/Thromboembolism No: 0   Vascular Dz: CAD/PAD/Aortic Plaque No: 0   DM No: 0   Total Score 4     Assessment/Plan   #Paroxysmal atrial  fibrillation s/p CryoPVI (2018)  #Tikosyn Monitoring  AF Dx History: years ago, feels fatigue but not palpitations or fluttering sensation.; h/o Cardioversion: Yes; AAD Use: Dofetilide 500mcg BID (current); Anticoagulation use: Xarelto 20mg Daily (current) Warfarin (stopped due to Change in Rx); h/o Ablation: 2018; [id number] Score: 4.  Overall burden 0.1% on ILR.  c/w AC: Xarelto 20mg Daily  Dofetilide 500mcg BID QT 364ms, Qtc 447ms - Nov 2023 QT 372ms and Qtc 439ms    #ILR monitoring  Followed at [place] [facility]  2 episodes of AT in May lasting 2 and 4 mins    #HTN  BP recently has been lower recently. Losartan was recently d/c'd    #Sleep Apnea  Patient reports she has been recently intolerant of her masks but has been having a lot of daytime sleepiness.  Her sleep study was from many years ago.  She was told that she is not a candidate for the inspire device due to her weight.  It may be worthwhile to repeat a sleep study and have her being evaluated to address her concerns.  Refer to Dr. Pierre for sleep evaluation, consideration for any central sleep apnea possible remade candidate    Return to Clinic: Patient should return to the EP Clinic in 6 months       Otto Rojas MD [facility]  Cardiac Electrophysiology  [email or web address]    **Disclaimer: This note was dictated by speech recognition, and every effort has been made to prevent any error in transcription, however minor errors may be present**

## 2024-05-13 ENCOUNTER — OFFICE VISIT (OUTPATIENT)
Dept: PRIMARY CARE | Facility: CLINIC | Age: 74
End: 2024-05-13
Payer: MEDICARE

## 2024-05-13 ENCOUNTER — HOSPITAL ENCOUNTER (OUTPATIENT)
Dept: CARDIOLOGY | Facility: CLINIC | Age: 74
Discharge: HOME | End: 2024-05-13
Payer: MEDICARE

## 2024-05-13 VITALS
HEART RATE: 92 BPM | DIASTOLIC BLOOD PRESSURE: 70 MMHG | RESPIRATION RATE: 18 BRPM | SYSTOLIC BLOOD PRESSURE: 124 MMHG | WEIGHT: 262 LBS | HEIGHT: 63 IN | TEMPERATURE: 97.2 F | BODY MASS INDEX: 46.42 KG/M2 | OXYGEN SATURATION: 94 %

## 2024-05-13 DIAGNOSIS — R35.0 URINARY FREQUENCY: Primary | ICD-10-CM

## 2024-05-13 DIAGNOSIS — N39.46 MIXED STRESS AND URGE URINARY INCONTINENCE: ICD-10-CM

## 2024-05-13 DIAGNOSIS — R73.03 PRE-DIABETES: ICD-10-CM

## 2024-05-13 DIAGNOSIS — M66.251 NONTRAUMATIC RUPTURE OF RIGHT QUADRICEPS TENDON: ICD-10-CM

## 2024-05-13 DIAGNOSIS — Z95.818 IMPLANTABLE LOOP RECORDER PRESENT: ICD-10-CM

## 2024-05-13 DIAGNOSIS — I51.9 MILD DIASTOLIC DYSFUNCTION: ICD-10-CM

## 2024-05-13 DIAGNOSIS — I48.0 PAROXYSMAL ATRIAL FIBRILLATION (MULTI): ICD-10-CM

## 2024-05-13 DIAGNOSIS — S76.111S: ICD-10-CM
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
## 2024-05-13 DIAGNOSIS — R60.9 DEPENDENT EDEMA: ICD-10-CM

## 2024-05-13 PROBLEM — R93.89 THICKENED ENDOMETRIUM: Status: RESOLVED | Noted: 2021-12-17 | Resolved: 2024-05-13
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
## 2024-05-13 PROBLEM — J96.01 ACUTE RESPIRATORY FAILURE WITH HYPOXIA (MULTI): Status: RESOLVED | Noted: 2022-12-02 | Resolved: 2024-05-13

## 2024-05-13 PROBLEM — S76.111A STRAIN OF RIGHT QUADRICEPS MUSCLE: Status: RESOLVED | Noted: 2024-05-13 | Resolved: 2024-05-13

## 2024-05-13 PROBLEM — N17.9 AKI (ACUTE KIDNEY INJURY) (CMS-HCC): Status: RESOLVED | Noted: 2023-12-28 | Resolved: 2024-05-13

## 2024-05-13 PROBLEM — S76.111A STRAIN OF RIGHT QUADRICEPS MUSCLE: Status: ACTIVE | Noted: 2024-05-13

## 2024-05-13 PROBLEM — M25.539 WRIST PAIN: Status: RESOLVED | Noted: 2023-10-30 | Resolved: 2024-05-13

## 2024-05-13 LAB
POC APPEARANCE, URINE: CLEAR
POC BILIRUBIN, URINE: NEGATIVE
POC BLOOD, URINE: ABNORMAL
POC COLOR, URINE: YELLOW
POC GLUCOSE, URINE: NEGATIVE MG/DL
POC HEMOGLOBIN A1C: 5.9 % (ref 4.2–6.5)
POC KETONES, URINE: NEGATIVE MG/DL
POC LEUKOCYTES, URINE: NEGATIVE
POC NITRITE,URINE: NEGATIVE
POC PH, URINE: 6 PH
POC PROTEIN, URINE: NEGATIVE MG/DL
POC SPECIFIC GRAVITY, URINE: 1.02
POC UROBILINOGEN, URINE: 0.2 EU/DL

## 2024-05-13 PROCEDURE — 1159F MED LIST DOCD IN RCRD: CPT

## 2024-05-13 PROCEDURE — 81002 URINALYSIS NONAUTO W/O SCOPE: CPT

## 2024-05-13 PROCEDURE — 1036F TOBACCO NON-USER: CPT

## 2024-05-13 PROCEDURE — 99214 OFFICE O/P EST MOD 30 MIN: CPT

## 2024-05-13 PROCEDURE — 93298 REM INTERROG DEV EVAL SCRMS: CPT

## 2024-05-13 PROCEDURE — 93298 REM INTERROG DEV EVAL SCRMS: CPT | Performed by: STUDENT IN AN ORGANIZED HEALTH CARE EDUCATION/TRAINING PROGRAM

## 2024-05-13 PROCEDURE — 3074F SYST BP LT 130 MM HG: CPT

## 2024-05-13 PROCEDURE — 87086 URINE CULTURE/COLONY COUNT: CPT

## 2024-05-13 PROCEDURE — 83036 HEMOGLOBIN GLYCOSYLATED A1C: CPT

## 2024-05-13 PROCEDURE — 3078F DIAST BP <80 MM HG: CPT

## 2024-05-13 RX ORDER — DOXYCYCLINE 100 MG/1
100 TABLET ORAL 2 TIMES DAILY
COMMUNITY
Start: 2024-05-11 | End: 2024-05-21

## 2024-05-13 RX ORDER — MUPIROCIN 20 MG/G
1 OINTMENT TOPICAL 3 TIMES DAILY
COMMUNITY
Start: 2024-05-11 | End: 2024-05-21

## 2024-05-13 NOTE — PROGRESS NOTES
"Subjective   Helen Johnston is a 73 y.o. female who presents for Wound Check (Possible uti).  Patient reports increased urinary frequency last few weeks.  She reports needing to urinate every 3 hours or so.  She says intermittently occasional suprapubic tenderness associated.  She denies dysuria, hematuria, fever, chills, nausea, vomiting, back pain, flank pain, abdominal pain, vaginal rashes, vaginal itching, change in vaginal discharge.  She does report intermittent symptoms of stress and interaction urinary incontinence so she does wear a pad for this almost every day.  She is on spironolactone per cardiology likely for her diastolic dysfunction.  She used to be on Lasix as needed for bilateral lower extremity however she has not been taking this recently.  She has been wearing her compression stockings more often as this is helping with the leg swelling.  She has a long history of prediabetes but is not on medication for this.  Her last A1c a few months ago was 5.9%.  She does report that her diet has not been good and she has gained some weight recently so she wonders if her A1c may have worsened.  Her bowel movements are regular.    Friday night her puppy scratched the back of her leg.  She cleaned it out immediately.  She does have a history of MRSA and issues with 3 infections so she went to urgent care on Saturday morning.  They gave her a 10-day course of doxycycline as a preventative measure.  Since then her leg has been sore.  She denies any drainage.  She used to see wound care however she has not been following with them recently.  No fevers, chills.    Objective   /70 (BP Location: Right arm, Patient Position: Sitting, BP Cuff Size: Large adult)   Pulse 92   Temp 36.2 °C (97.2 °F)   Resp 18   Ht 1.588 m (5' 2.5\")   Wt 119 kg (262 lb)   SpO2 94%   BMI 47.16 kg/m²    PHYSICAL EXAM  Gen: Well appearing, in NAD  Eyes: EOMI  HEENT: MMM  Heart: RRR, no murmurs  Lungs: No increased work of " breathing, CTAB, on RA  Extremities: WWP, cap refill <2sec, trace pitting edema in bilateral lower extremities to the knee, there is chronic erythema of right anterior shin without any warmth or pain or concern for acute cellulitis  Neuro: Alert, symmetrical facies, moves all extremities equally  Skin: There is a 2 cm linear scratch on the right posterior calf without any drainage, bleeding.  No surrounding erythema.  No concern for acute cellulitis.  Psych: Appropriate mood and affect    Assessment/Plan     Problem List Items Addressed This Visit       Dependent edema    Mild diastolic dysfunction    Nontraumatic rupture of quadriceps tendon     Patient used to follow with physical therapy for the pain associated with her right quadriceps however has not been following with them recently and would like to reestablish so referral was provided.         Urinary frequency - Primary     In office point-of-care A1c today is again 5.9% so do not think hyperglycemia is the cause of her polyuria. In office UA today showed 1+ blood but no other signs of infection so have low suspicion for UTI causing for urinary frequency. Will still send for culture. Will need to repeat UA in the future to ensure resolving of hematuria. Suspect that her urinary frequency could be related to her spironolactone so she will contact her cardiologist and see if she is able to stop this medication or not. She also may have some pelvic floor dysfunction so she is going to pelvic floor PT. Could also be a component of overactive bladder.          Relevant Orders    POCT UA (nonautomated) manually resulted (Completed)    POCT glycosylated hemoglobin (Hb A1C) manually resulted (Completed)    Urine Culture    Mixed stress and urge urinary incontinence     Reports stress and urge incontinence symptoms for which she frequently leaks urine and has to wear a pad almost every day 4.  She has never vaginally given birth.  Suspect there could be some pelvic  floor dysfunction and patient has tried doing Kegel's at home however she is not sure if she is doing them correctly and she is interested in pelvic floor physical therapy so referral was provided today.         Relevant Orders    Referral to Physical Therapy    Dog scratch     Patient's dog scratched her right posterior calf and she has already seen urgent care for this and they started her on a 10-day course of Doxy since patient has a history of MRSA infection.  Her scratch today does not look acutely infected and I am not concerned for cellulitis however recommend that patient finish her course of doxycycline out of an abundance of caution.  She should also continue to wear her bilateral lower extremity compression stockings.  She has follow-up with wound care in the past however she has frequent intermittent wound concerns so I gave her a new referral for them.         Relevant Orders    Referral to Wound Clinic    Pre-diabetes    Relevant Orders    POCT glycosylated hemoglobin (Hb A1C) manually resulted (Completed)    RESOLVED: Strain of right quadriceps muscle    Relevant Orders    Referral to Physical Therapy      Elsie Ziegler DO  Family Medicine Resident, PGY-3  [facility]  [address] [place] [address] [phone]

## 2024-05-13 NOTE — ASSESSMENT & PLAN NOTE
In office point-of-care A1c today is again 5.9% so do not think hyperglycemia is the cause of her polyuria. In office UA today showed 1+ blood but no other signs of infection so have low suspicion for UTI causing for urinary frequency. Will still send for culture. Will need to repeat UA in the future to ensure resolving of hematuria. Suspect that her urinary frequency could be related to her spironolactone so she will contact her cardiologist and see if she is able to stop this medication or not. She also may have some pelvic floor dysfunction so she is going to pelvic floor PT. Could also be a component of overactive bladder.

## 2024-05-13 NOTE — ASSESSMENT & PLAN NOTE
Patient used to follow with physical therapy for the pain associated with her right quadriceps however has not been following with them recently and would like to reestablish so referral was provided.

## 2024-05-13 NOTE — PROGRESS NOTES
I reviewed the resident/fellow's documentation and discussed the patient with the resident. I agree with the resident medical decision making as documented in the note.   Patient has not creased urine frequency.  No dysuria, no fever no chills, patient states she did not have any polydipsia, urine did show some blood we will send the urine for culture.  Patient's A1c appeared fine.  Patient's scratch appears to be doing fine she is being treated for it.  She is to continue monitoring if any increasing redness, swelling fever chills any chest pain shortness of breath any abdominal pain worsening symptoms go to the ER  Follow-up in 1 to 2 weeks  Agree with assessment and plan  Burt Elizabeth, DO

## 2024-05-13 NOTE — ASSESSMENT & PLAN NOTE
Patient's dog scratched her right posterior calf and she has already seen urgent care for this and they started her on a 10-day course of Doxy since patient has a history of MRSA infection.  Her scratch today does not look acutely infected and I am not concerned for cellulitis however recommend that patient finish her course of doxycycline out of an abundance of caution.  She should also continue to wear her bilateral lower extremity compression stockings.  She has follow-up with wound care in the past however she has frequent intermittent wound concerns so I gave her a new referral for them.

## 2024-05-13 NOTE — ASSESSMENT & PLAN NOTE
Reports stress and urge incontinence symptoms for which she frequently leaks urine and has to wear a pad almost every day 4.  She has never vaginally given birth.  Suspect there could be some pelvic floor dysfunction and patient has tried doing Kegel's at home however she is not sure if she is doing them correctly and she is interested in pelvic floor physical therapy so referral was provided today.

## 2024-05-15 ENCOUNTER — HOSPITAL ENCOUNTER (OUTPATIENT)
Dept: CARDIOLOGY | Facility: CLINIC | Age: 74
Discharge: HOME | End: 2024-05-15
Payer: MEDICARE

## 2024-05-15 ENCOUNTER — TELEPHONE (OUTPATIENT)
Dept: PRIMARY CARE | Facility: CLINIC | Age: 74
End: 2024-05-15
Payer: MEDICARE

## 2024-05-15 DIAGNOSIS — Z95.818 IMPLANTABLE LOOP RECORDER PRESENT: ICD-10-CM

## 2024-05-15 DIAGNOSIS — I48.0 PAROXYSMAL ATRIAL FIBRILLATION (MULTI): ICD-10-CM

## 2024-05-15 LAB — BACTERIA UR CULT: NORMAL

## 2024-05-15 NOTE — TELEPHONE ENCOUNTER
Pt called requesting assistance with scheduling her referrals to the Wound Clinic and PT. Are you able to assist her with this? Thank you!

## 2024-05-20 ENCOUNTER — HOSPITAL ENCOUNTER (OUTPATIENT)
Dept: CARDIOLOGY | Facility: CLINIC | Age: 74
Discharge: HOME | End: 2024-05-20
Payer: MEDICARE

## 2024-05-20 DIAGNOSIS — I48.0 PAROXYSMAL ATRIAL FIBRILLATION (MULTI): ICD-10-CM

## 2024-05-20 DIAGNOSIS — Z95.818 IMPLANTABLE LOOP RECORDER PRESENT: ICD-10-CM

## 2024-05-29 ENCOUNTER — HOSPITAL ENCOUNTER (OUTPATIENT)
Dept: CARDIOLOGY | Facility: CLINIC | Age: 74
Discharge: HOME | End: 2024-05-29
Payer: MEDICARE

## 2024-05-29 DIAGNOSIS — I48.0 PAROXYSMAL ATRIAL FIBRILLATION (MULTI): ICD-10-CM

## 2024-05-29 DIAGNOSIS — Z95.818 IMPLANTABLE LOOP RECORDER PRESENT: ICD-10-CM

## 2024-06-03 ENCOUNTER — HOSPITAL ENCOUNTER (OUTPATIENT)
Dept: CARDIOLOGY | Facility: CLINIC | Age: 74
Discharge: HOME | End: 2024-06-03
Payer: MEDICARE

## 2024-06-03 DIAGNOSIS — I48.0 PAROXYSMAL ATRIAL FIBRILLATION (MULTI): ICD-10-CM

## 2024-06-03 DIAGNOSIS — Z95.818 IMPLANTABLE LOOP RECORDER PRESENT: ICD-10-CM

## 2024-06-05 ENCOUNTER — EVALUATION (OUTPATIENT)
Dept: PHYSICAL THERAPY | Facility: CLINIC | Age: 74
End: 2024-06-05
Payer: MEDICARE

## 2024-06-05 ENCOUNTER — HOSPITAL ENCOUNTER (OUTPATIENT)
Dept: CARDIOLOGY | Facility: CLINIC | Age: 74
Discharge: HOME | End: 2024-06-05
Payer: MEDICARE

## 2024-06-05 DIAGNOSIS — Z95.818 IMPLANTABLE LOOP RECORDER PRESENT: ICD-10-CM

## 2024-06-05 DIAGNOSIS — S76.111S: ICD-10-CM

## 2024-06-05 DIAGNOSIS — I48.0 PAROXYSMAL ATRIAL FIBRILLATION (MULTI): ICD-10-CM

## 2024-06-05 PROCEDURE — 97161 PT EVAL LOW COMPLEX 20 MIN: CPT | Mod: GP | Performed by: PHYSICAL THERAPIST

## 2024-06-05 PROCEDURE — 97110 THERAPEUTIC EXERCISES: CPT | Mod: GP | Performed by: PHYSICAL THERAPIST

## 2024-06-05 ASSESSMENT — PAIN SCALES - GENERAL: PAINLEVEL_OUTOF10: 3

## 2024-06-05 ASSESSMENT — PAIN - FUNCTIONAL ASSESSMENT: PAIN_FUNCTIONAL_ASSESSMENT: 0-10

## 2024-06-05 NOTE — PROGRESS NOTES
Physical Therapy Evaluation and Treatment      Patient Name: Helen Johnston  MRN: [id number]  Today's Date: 6/5/2024  Time Calculation  Start Time: 1545  Stop Time: 1628  Time Calculation (min): 43 min    Insurance:  Visit:  1 Albin Canela required: No  Payor: [employer] / Plan: [employer]ICARE / Product Type: *No Product type* /    Certification Dates:  6/5/24 - 9/3/24    Assessment:  PT Assessment  PT Assessment Results: Decreased strength, Decreased range of motion, Decreased mobility, Impaired balance, Pain  Rehab Prognosis: Good  Evaluation/Treatment Tolerance: Patient tolerated treatment well   Patient presents to physical therapy with c/o R quad pain.  Patient with hx of R quad tendon rupture and repair in 2006 which she was able to fully recovery from.  States she had an episode in December when her puppy got away from her in the garage and had to do a lot of shifting around of boxes to free the dog.  Noted LBP with  radiating pain into R thigh which she was able to treat with PT at [facility].  States tingling in thigh persisted and then in March noticed difficulty with lifting R leg for car transfer, noting pain in R distal quad.      Plan:  OP PT Plan  Treatment/Interventions: Cryotherapy, Dry needling, Education/ Instruction, Electrical stimulation, Gait training, Hot pack, Manual therapy, Neuromuscular re-education, Self care/ home management, Taping techniques, Therapeutic activities, Therapeutic exercises, Ultrasound  PT Plan: Skilled PT  PT Frequency: 1 time per week  Duration: 12wks  Onset Date: 03/01/24  Certification Period Start Date: 06/05/24  Certification Period End Date: 09/03/24  Rehab Potential: Good  Plan of Care Agreement: Patient    Current Problem:   1. Strain of right quadriceps muscle, sequela  Referral to Physical Therapy    Follow Up In Physical Therapy          Subjective    Patient presents to physical therapy for evaluation of R knee.  Patient with hx of R quad tendon  rupture and repair in 2006 which she was able to fully recovery from.  States she had an episode in December when her puppy got away from her in the garage and had to do a lot of shifting around of boxes to free the dog.  Noted LBP with  radiating pain into R thigh which she was able to treat with PT at [facility].  States tingling in thigh persisted and then in March noticed difficulty with lifting R leg for car transfer, noting pain in R distal quad.  C/o R quad pain.    PREVIOUS INTERVENTION:  N/A    EXACERBATING FACTORS:  Car transfer  Step to with stairs  Bed transfer    RELIEVING FACTORS:  Avoid provoking activity    OCCUPATION:  Retired teacher    HOBBIES:  N/A    HOME SETUP:  Single story set up with 2 step entry    BARRIERS IMPACTING CARE:  N/A    General:  General  Reason for Referral: R quad strain  Referred By: Dr. Elizabeth  Precautions:  Precautions  STEADI Fall Risk Score (The score of 4 or more indicates an increased risk of falling): 0  Medical Precautions: No known precautions/limitation (Medical hx reviewed.  (+) for HTN, asthma, COPD, thyroid disease.  Not likely to impact care.)  Pain:  Pain Assessment  Pain Assessment: 0-10  Pain Score: 3  Pain Location: Knee  Pain Orientation: Right  Prior Level of Function:  Prior Function Per Pt/Caregiver Report  Level of [place]: Independent with ADLs and functional transfers    Objective   OBSERVATION  RS posture  Ambulates with 3WW with fwd lean of trunk    AROM  R knee flexion 103  R knee extension 0    L knee flexion 110  L knee extension 0    STRENGTH  R knee flexion 5/5  R knee extension 4+/5    L knee flexion 5/5  L knee extension 5/5    R hip flexion 4-/5  R hip extension 3/5  R hip ABD 4-/5  R hip ER 4-/5    L hip flexion 5/5  L hip extension 3/5  L hip ABD 4-/5  L hip ER 4-/5    PALPATION  No TTP noted    Outcome Measures:  Other Measures  Lower Extremity Funtional Score (LEFS): 18/80     TREATMENT  THERAPEUTIC EXERCISE:  Access Code:  [email or web address]  URL: [email or web address]  Date: 06/05/2024  Prepared by: Emely Hu    Exercises  - Supine Active Straight Leg Raise  - 2 x daily - 7 x weekly - 1 sets - 10 reps  - Supine Bridge  - 2 x daily - 7 x weekly - 1 sets - 10 reps  - Seated Long Arc Quad  - 2 x daily - 7 x weekly - 1 sets - 10 reps - 5 hold  - Single Leg Balance with Clock Reach  - 2 x daily - 7 x weekly - 1 sets - 10 reps    EDUCATION:   Patient education on diagnosis/prognosis, pathophysiology, POC, and HEP.  Patient demonstrates understanding of HEP/POC.    Goals:  1. Pain 0/10  2. Outcomes Measure:  LEFS 40/80  3. RLE strength 5/5 to allow patient to lift leg into car without increased sx.   4. Demonstrates independence with HEP.

## 2024-06-10 ENCOUNTER — HOSPITAL ENCOUNTER (OUTPATIENT)
Dept: CARDIOLOGY | Facility: CLINIC | Age: 74
Discharge: HOME | End: 2024-06-10
Payer: MEDICARE

## 2024-06-10 DIAGNOSIS — I48.0 PAROXYSMAL ATRIAL FIBRILLATION (MULTI): ICD-10-CM

## 2024-06-10 DIAGNOSIS — Z95.818 IMPLANTABLE LOOP RECORDER PRESENT: ICD-10-CM

## 2024-06-11 ENCOUNTER — APPOINTMENT (OUTPATIENT)
Dept: RADIOLOGY | Facility: HOSPITAL | Age: 74
End: 2024-06-11
Payer: MEDICARE

## 2024-06-11 ENCOUNTER — HOSPITAL ENCOUNTER (EMERGENCY)
Facility: HOSPITAL | Age: 74
Discharge: HOME | End: 2024-06-11
Attending: EMERGENCY MEDICINE
Payer: MEDICARE

## 2024-06-11 ENCOUNTER — APPOINTMENT (OUTPATIENT)
Dept: CARDIOLOGY | Facility: HOSPITAL | Age: 74
End: 2024-06-11
Payer: MEDICARE

## 2024-06-11 ENCOUNTER — HOSPITAL ENCOUNTER (OUTPATIENT)
Dept: CARDIOLOGY | Facility: CLINIC | Age: 74
Discharge: HOME | End: 2024-06-11
Payer: MEDICARE

## 2024-06-11 VITALS
BODY MASS INDEX: 46.42 KG/M2 | HEART RATE: 75 BPM | SYSTOLIC BLOOD PRESSURE: 153 MMHG | OXYGEN SATURATION: 97 % | RESPIRATION RATE: 20 BRPM | TEMPERATURE: 97.3 F | WEIGHT: 262 LBS | DIASTOLIC BLOOD PRESSURE: 76 MMHG | HEIGHT: 63 IN

## 2024-06-11 DIAGNOSIS — N20.1 URETERAL STONE: Primary | ICD-10-CM

## 2024-06-11 DIAGNOSIS — I48.0 PAROXYSMAL ATRIAL FIBRILLATION (MULTI): ICD-10-CM

## 2024-06-11 DIAGNOSIS — Z95.818 IMPLANTABLE LOOP RECORDER PRESENT: ICD-10-CM

## 2024-06-11 DIAGNOSIS — R11.0 NAUSEA: ICD-10-CM

## 2024-06-11 LAB
ALBUMIN SERPL BCP-MCNC: 3.9 G/DL (ref 3.4–5)
ALP SERPL-CCNC: 66 U/L (ref 33–136)
ALT SERPL W P-5'-P-CCNC: 10 U/L (ref 7–45)
ANION GAP SERPL CALC-SCNC: 11 MMOL/L (ref 10–20)
APPEARANCE UR: ABNORMAL
AST SERPL W P-5'-P-CCNC: 21 U/L (ref 9–39)
BASOPHILS # BLD AUTO: 0.02 X10*3/UL (ref 0–0.1)
BASOPHILS NFR BLD AUTO: 0.3 %
BILIRUB SERPL-MCNC: 0.7 MG/DL (ref 0–1.2)
BILIRUB UR STRIP.AUTO-MCNC: NEGATIVE MG/DL
BODY SURFACE AREA: 2.3 M2
BUN SERPL-MCNC: 20 MG/DL (ref 6–23)
CALCIUM SERPL-MCNC: 10.2 MG/DL (ref 8.6–10.3)
CARDIAC TROPONIN I PNL SERPL HS: 16 NG/L (ref 0–13)
CARDIAC TROPONIN I PNL SERPL HS: 16 NG/L (ref 0–13)
CHLORIDE SERPL-SCNC: 102 MMOL/L (ref 98–107)
CO2 SERPL-SCNC: 30 MMOL/L (ref 21–32)
COLOR UR: ABNORMAL
CREAT SERPL-MCNC: 1.05 MG/DL (ref 0.5–1.05)
EGFRCR SERPLBLD CKD-EPI 2021: 57 ML/MIN/1.73M*2
EOSINOPHIL # BLD AUTO: 0.03 X10*3/UL (ref 0–0.4)
EOSINOPHIL NFR BLD AUTO: 0.4 %
ERYTHROCYTE [DISTWIDTH] IN BLOOD BY AUTOMATED COUNT: 13 % (ref 11.5–14.5)
GLUCOSE SERPL-MCNC: 118 MG/DL (ref 74–99)
GLUCOSE UR STRIP.AUTO-MCNC: NORMAL MG/DL
HCT VFR BLD AUTO: 42.6 % (ref 36–46)
HGB BLD-MCNC: 12.8 G/DL (ref 12–16)
HOLD SPECIMEN: NORMAL
IMM GRANULOCYTES # BLD AUTO: 0.02 X10*3/UL (ref 0–0.5)
IMM GRANULOCYTES NFR BLD AUTO: 0.3 % (ref 0–0.9)
KETONES UR STRIP.AUTO-MCNC: NEGATIVE MG/DL
LEUKOCYTE ESTERASE UR QL STRIP.AUTO: NEGATIVE
LIPASE SERPL-CCNC: 28 U/L (ref 9–82)
LYMPHOCYTES # BLD AUTO: 0.65 X10*3/UL (ref 0.8–3)
LYMPHOCYTES NFR BLD AUTO: 8.2 %
MCH RBC QN AUTO: 28.1 PG (ref 26–34)
MCHC RBC AUTO-ENTMCNC: 30 G/DL (ref 32–36)
MCV RBC AUTO: 94 FL (ref 80–100)
MONOCYTES # BLD AUTO: 0.64 X10*3/UL (ref 0.05–0.8)
MONOCYTES NFR BLD AUTO: 8.1 %
MUCOUS THREADS #/AREA URNS AUTO: ABNORMAL /LPF
NEUTROPHILS # BLD AUTO: 6.52 X10*3/UL (ref 1.6–5.5)
NEUTROPHILS NFR BLD AUTO: 82.7 %
NITRITE UR QL STRIP.AUTO: NEGATIVE
NRBC BLD-RTO: 0 /100 WBCS (ref 0–0)
PH UR STRIP.AUTO: 5.5 [PH]
PLATELET # BLD AUTO: 154 X10*3/UL (ref 150–450)
POTASSIUM SERPL-SCNC: 4.4 MMOL/L (ref 3.5–5.3)
PROT SERPL-MCNC: 7.5 G/DL (ref 6.4–8.2)
PROT UR STRIP.AUTO-MCNC: ABNORMAL MG/DL
RBC # BLD AUTO: 4.55 X10*6/UL (ref 4–5.2)
RBC # UR STRIP.AUTO: ABNORMAL /UL
RBC #/AREA URNS AUTO: >20 /HPF
SODIUM SERPL-SCNC: 139 MMOL/L (ref 136–145)
SP GR UR STRIP.AUTO: >1.05
SQUAMOUS #/AREA URNS AUTO: ABNORMAL /HPF
UROBILINOGEN UR STRIP.AUTO-MCNC: NORMAL MG/DL
WBC # BLD AUTO: 7.9 X10*3/UL (ref 4.4–11.3)
WBC #/AREA URNS AUTO: ABNORMAL /HPF

## 2024-06-11 PROCEDURE — 74177 CT ABD & PELVIS W/CONTRAST: CPT

## 2024-06-11 PROCEDURE — 93005 ELECTROCARDIOGRAM TRACING: CPT

## 2024-06-11 PROCEDURE — 84484 ASSAY OF TROPONIN QUANT: CPT | Performed by: EMERGENCY MEDICINE

## 2024-06-11 PROCEDURE — 2500000005 HC RX 250 GENERAL PHARMACY W/O HCPCS: Performed by: EMERGENCY MEDICINE

## 2024-06-11 PROCEDURE — 96361 HYDRATE IV INFUSION ADD-ON: CPT

## 2024-06-11 PROCEDURE — 81001 URINALYSIS AUTO W/SCOPE: CPT

## 2024-06-11 PROCEDURE — 87086 URINE CULTURE/COLONY COUNT: CPT | Mod: STJLAB

## 2024-06-11 PROCEDURE — 85025 COMPLETE CBC W/AUTO DIFF WBC: CPT

## 2024-06-11 PROCEDURE — 2550000001 HC RX 255 CONTRASTS: Performed by: EMERGENCY MEDICINE

## 2024-06-11 PROCEDURE — 2500000004 HC RX 250 GENERAL PHARMACY W/ HCPCS (ALT 636 FOR OP/ED)

## 2024-06-11 PROCEDURE — 84484 ASSAY OF TROPONIN QUANT: CPT | Mod: 91 | Performed by: EMERGENCY MEDICINE

## 2024-06-11 PROCEDURE — 36415 COLL VENOUS BLD VENIPUNCTURE: CPT | Performed by: EMERGENCY MEDICINE

## 2024-06-11 PROCEDURE — 80053 COMPREHEN METABOLIC PANEL: CPT

## 2024-06-11 PROCEDURE — 99285 EMERGENCY DEPT VISIT HI MDM: CPT | Mod: 25

## 2024-06-11 PROCEDURE — 74177 CT ABD & PELVIS W/CONTRAST: CPT | Performed by: RADIOLOGY

## 2024-06-11 PROCEDURE — 96374 THER/PROPH/DIAG INJ IV PUSH: CPT

## 2024-06-11 PROCEDURE — 96375 TX/PRO/DX INJ NEW DRUG ADDON: CPT

## 2024-06-11 PROCEDURE — 83690 ASSAY OF LIPASE: CPT

## 2024-06-11 RX ORDER — TAMSULOSIN HYDROCHLORIDE 0.4 MG/1
0.4 CAPSULE ORAL DAILY
Qty: 30 CAPSULE | Refills: 0 | Status: SHIPPED | OUTPATIENT
Start: 2024-06-11 | End: 2024-07-11

## 2024-06-11 RX ORDER — ONDANSETRON HYDROCHLORIDE 2 MG/ML
4 INJECTION, SOLUTION INTRAVENOUS ONCE
Status: COMPLETED | OUTPATIENT
Start: 2024-06-11 | End: 2024-06-11

## 2024-06-11 RX ORDER — KETOROLAC TROMETHAMINE 15 MG/ML
15 INJECTION, SOLUTION INTRAMUSCULAR; INTRAVENOUS ONCE
Status: COMPLETED | OUTPATIENT
Start: 2024-06-11 | End: 2024-06-11

## 2024-06-11 RX ORDER — MORPHINE SULFATE 4 MG/ML
4 INJECTION, SOLUTION INTRAMUSCULAR; INTRAVENOUS ONCE
Status: COMPLETED | OUTPATIENT
Start: 2024-06-11 | End: 2024-06-11

## 2024-06-11 RX ORDER — HYDROCODONE BITARTRATE AND ACETAMINOPHEN 5; 325 MG/1; MG/1
1 TABLET ORAL EVERY 6 HOURS PRN
Qty: 12 TABLET | Refills: 0 | Status: SHIPPED | OUTPATIENT
Start: 2024-06-11

## 2024-06-11 RX ORDER — ONDANSETRON 4 MG/1
4 TABLET, ORALLY DISINTEGRATING ORAL EVERY 8 HOURS PRN
Qty: 15 TABLET | Refills: 0 | Status: SHIPPED | OUTPATIENT
Start: 2024-06-11

## 2024-06-11 RX ORDER — IBUPROFEN 600 MG/1
600 TABLET ORAL EVERY 6 HOURS PRN
Qty: 30 TABLET | Refills: 0 | Status: SHIPPED | OUTPATIENT
Start: 2024-06-11

## 2024-06-11 ASSESSMENT — PAIN SCALES - GENERAL
PAINLEVEL_OUTOF10: 3
PAINLEVEL_OUTOF10: 7
PAINLEVEL_OUTOF10: 5 - MODERATE PAIN
PAINLEVEL_OUTOF10: 8

## 2024-06-11 ASSESSMENT — PAIN DESCRIPTION - DESCRIPTORS: DESCRIPTORS: ACHING

## 2024-06-11 ASSESSMENT — LIFESTYLE VARIABLES
HAVE PEOPLE ANNOYED YOU BY CRITICIZING YOUR DRINKING: NO
TOTAL SCORE: 0
EVER FELT BAD OR GUILTY ABOUT YOUR DRINKING: NO
EVER HAD A DRINK FIRST THING IN THE MORNING TO STEADY YOUR NERVES TO GET RID OF A HANGOVER: NO
HAVE YOU EVER FELT YOU SHOULD CUT DOWN ON YOUR DRINKING: NO

## 2024-06-11 ASSESSMENT — PAIN DESCRIPTION - PAIN TYPE: TYPE: ACUTE PAIN

## 2024-06-11 ASSESSMENT — PAIN DESCRIPTION - LOCATION
LOCATION: ABDOMEN
LOCATION: ABDOMEN

## 2024-06-11 ASSESSMENT — PAIN - FUNCTIONAL ASSESSMENT
PAIN_FUNCTIONAL_ASSESSMENT: 0-10

## 2024-06-11 ASSESSMENT — PAIN DESCRIPTION - ORIENTATION: ORIENTATION: LEFT

## 2024-06-11 NOTE — DISCHARGE INSTRUCTIONS
You have a kidney stone stuck in the left ureter, the tube between the kidney and the bladder.  Take medications as instructed.  If pain cannot be reasonably controlled, he develop intractable vomiting, fevers, or other worrisome symptoms, return to the ER.

## 2024-06-11 NOTE — ED PROVIDER NOTES
EMERGENCY DEPARTMENT ENCOUNTER      Pt Name: Helen Johnston  MRN: [id number]  Birthdate 1950  Date of evaluation: 6/11/2024  Provider: Min Garcia DO    CHIEF COMPLAINT       Chief Complaint   Patient presents with    see note     Pt c/o abd pain that occurred after she cleaned house heavily and was followed by n/v/loose stools, constipation, diarrhea, dry heaves, etc.          HISTORY OF PRESENT ILLNESS    HPI  Patient 73-year-old female with history of A-fib on Xarelto, GERD, kidney stones, COPD, HFpEF presenting with abdominal pain.  This been ongoing since Saturday after she was cleaning her house.  Pain is 8/10, left upper quadrant, radiating around to her back, crampy, intermittent, without consistent alleviating or exacerbating factors.  Associated nausea with dry heaves but no vomiting.  She had several episodes of diarrhea on Saturday but has not had a bowel movement since.  She continues to pass gas.  She denies fever, chills, chest pain, dysuria, hematuria, urinary frequency, bloody stools.    Nursing Notes were reviewed.    PAST MEDICAL HISTORY     Past Medical History:   Diagnosis Date    Diabetes mellitus (Multi)          SURGICAL HISTORY     No past surgical history on file.      CURRENT MEDICATIONS       Discharge Medication List as of 6/11/2024 12:25 PM        CONTINUE these medications which have NOT CHANGED    Details   albuterol 5 mg/mL nebulizer solution Inhale 1 mL (5 mg)., Historical Med      albuterol 90 mcg/actuation inhaler Inhale 2 puffs every 6 hours if needed., Starting Mon 11/11/2013, Historical Med      atorvastatin (Lipitor) 20 mg tablet Take 1 tablet (20 mg) by mouth once daily., Starting Tue 12/23/2014, Historical Med      bacitracin ophthalmic ointment APPLY 1/4 INCH TO BOTH EYES AT BEDTIME, Historical Med      cetirizine (ZYRTEC) 10 mg capsule Take by mouth., Historical Med      cholecalciferol (Vitamin D-3) 50,000 unit capsule Take 1 capsule (50,000 Units) by mouth  once a week., Starting Tue 12/13/2022, Historical Med      dofetilide (Tikosyn) 500 mcg capsule Take 1 capsule (500 mcg) by mouth 2 times a day., Starting Thu 5/9/2024, Until Fri 5/9/2025, Normal      dyclonine (Sucrets) 2 mg lozenge Place 1 lozenge into mouth between cheek and gum., Starting Mon 3/11/2019, Historical Med      furosemide (Lasix) 20 mg tablet Take 1 tablet (20 mg) by mouth 2 times a day as needed (leg swelling)., Starting Mon 1/22/2024, Until Tue 1/21/2025 at 2359, Normal      hydrocortisone-iodoquinoL (Dermazene) 1-1 % cream in packet Apply topically., Starting Wed 8/3/2022, Historical Med      levothyroxine (Synthroid, Levoxyl) 50 mcg tablet Take 1 tablet (50 mcg) by mouth once daily., Starting Tue 12/9/2014, Historical Med      losartan (Cozaar) 25 mg tablet Take 1 tablet (25 mg) by mouth once daily at bedtime., Starting Tue 7/18/2017, Historical Med      loteprednol (Lotemax) 0.5 % ophthalmic suspension 1 drop., Historical Med      meloxicam (Mobic) 7.5 mg tablet Take 1 tablet (7.5 mg) by mouth once daily., Starting Sun 10/2/2022, Historical Med      metoprolol succinate XL (Toprol-XL) 50 mg 24 hr tablet Take 1 tablet (50 mg) by mouth once daily at bedtime., Starting Thu 5/9/2024, Until Fri 5/9/2025, Normal      miSOPROStoL (Cytotec) 200 mcg tablet 1 tablet (200 mcg)., Starting Thu 12/16/2021, Historical Med      neomycin-bacitracin-polymyxin-hydrocortisone (Cortisporin) 3.5-400-10,000 mg-unit/g-1% ophthalmic ointment Apply to affected eye(s)., Historical Med      nystatin (Mycostatin) 100,000 unit/gram powder Apply topically. Apply to affected areas 2-3 times daily, Starting Mon 1/18/2016, Historical Med      olopatadine (Patanol) 0.1 % ophthalmic solution Administer into affected eye(s) every 12 hours., Historical Med      pantoprazole (ProtoNix) 40 mg EC tablet Take 1 tablet (40 mg) by mouth once daily in the morning. Take before meals., Starting Wed 10/13/2021, Historical Med      peg  400-propylene glycol (Systane, propylene glycoL,) 0.4-0.3 % drops ophthalmic drops 1 each., Starting Fri 3/7/2014, Historical Med      prednisoLONE acetate (Pred-Forte) 1 % ophthalmic suspension Administer into affected eye(s) 2 times a day as needed., Historical Med      rivaroxaban (Xarelto) 20 mg tablet Take 1 tablet (20 mg) by mouth once daily in the evening. Take with meals., Starting Thu 5/9/2024, Until Fri 5/9/2025, Normal      spironolactone (Aldactone) 25 mg tablet Take 1 tablet (25 mg) by mouth once daily., Starting Thu 11/14/2013, Historical Med      tobramycin-dexamethasone (Tobradex) ophthalmic ointment Place into both eyes nightly Apply to both eyes nightly., Historical Med      triamcinolone (Kenalog) 0.1 % ointment Apply topically., Historical Med      urea (Carmol) 40 % cream Apply topically., Starting Thu 1/24/2019, Historical Med             ALLERGIES     Lisinopril, Thiopental, Vancomycin, Cefuroxime, Erythromycin, Linezolid, Procainamide, Procaine, and Silver sulfadiazine    FAMILY HISTORY     No family history on file.       SOCIAL HISTORY       Social History     Socioeconomic History    Marital status: Single     Spouse name: Not on file    Number of children: Not on file    Years of education: Not on file    Highest education level: Not on file   Occupational History    Not on file   Tobacco Use    Smoking status: Never    Smokeless tobacco: Never   Substance and Sexual Activity    Alcohol use: Never    Drug use: Never    Sexual activity: Not on file   Other Topics Concern    Not on file   Social History Narrative    Not on file     Social Determinants of Health     Financial Resource Strain: Low Risk  (5/28/2024)    Received from [facility]    Overall Financial Resource Strain (CARDIA)     Difficulty of Paying Living Expenses: Not very hard   Food Insecurity: No Food Insecurity (5/28/2024)    Received from [facility]    Hunger Vital Sign     Worried About Running Out of Food in  the Last Year: Never true     Ran Out of Food in the Last Year: Never true   Transportation Needs: No Transportation Needs (5/28/2024)    Received from [facility]    PRAPARE - Transportation     Lack of Transportation (Medical): No     Lack of Transportation (Non-Medical): No   Physical Activity: Unknown (5/28/2024)    Received from [facility]    Exercise Vital Sign     Days of Exercise per Week: 0 days     Minutes of Exercise per Session: Not on file   Stress: No Stress Concern Present (5/15/2023)    Received from [facility]    [place] [employer] - Occupational Stress Questionnaire     Feeling of Stress : Only a little   Social Connections: Moderately Integrated (5/28/2024)    Received from [facility]    Social Connection and Isolation Panel [NHANES]     Frequency of Communication with Friends and Family: More than three times a week     Frequency of Social Gatherings with Friends and Family: Once a week     Attends Buddhism Services: 1 to 4 times per year     Active Member of Clubs or Organizations: Yes     Attends Club or Organization Meetings: More than 4 times per year     Marital Status: Never    Intimate Partner Violence: Not on file   Housing Stability: Unknown (5/28/2024)    Received from [facility]    Housing Stability Vital Sign     Unable to Pay for Housing in the Last Year: No     Number of Places Lived in the Last Year: Not on file     Unstable Housing in the Last Year: No       SCREENINGS                        PHYSICAL EXAM    (up to 7 for level 4, 8 or more for level 5)     ED Triage Vitals [06/11/24 0544]   Temperature Heart Rate Respirations BP   36.3 °C (97.3 °F) 66 18 (!) 193/86      Pulse Ox Temp Source Heart Rate Source Patient Position   95 % Temporal Monitor Sitting      BP Location FiO2 (%)     Right arm --       Physical Exam  Vitals and nursing note reviewed.   Constitutional:       General: She is not in acute distress.      Appearance: She is not ill-appearing.   HENT:      Head: Normocephalic and atraumatic.      Nose: Nose normal.      Mouth/Throat:      Mouth: Mucous membranes are moist.      Pharynx: Oropharynx is clear.   Eyes:      Extraocular Movements: Extraocular movements intact.      Conjunctiva/sclera: Conjunctivae normal.   Cardiovascular:      Rate and Rhythm: Normal rate and regular rhythm.      Heart sounds: Normal heart sounds.   Pulmonary:      Effort: Pulmonary effort is normal. No respiratory distress.      Breath sounds: Normal breath sounds.   Abdominal:      General: There is no distension.      Palpations: Abdomen is soft.      Tenderness: There is no right CVA tenderness, left CVA tenderness, guarding or rebound.      Comments: LUQ TTP   Musculoskeletal:         General: No deformity or signs of injury.      Cervical back: Normal range of motion and neck supple.   Skin:     General: Skin is warm and dry.   Neurological:      General: No focal deficit present.          DIAGNOSTIC RESULTS     LABS:  Labs Reviewed   CBC WITH AUTO DIFFERENTIAL - Abnormal       Result Value    WBC 7.9      nRBC 0.0      RBC 4.55      Hemoglobin 12.8      Hematocrit 42.6      MCV 94      MCH 28.1      MCHC 30.0 (*)     RDW 13.0      Platelets 154      Neutrophils % 82.7      Immature Granulocytes %, Automated 0.3      Lymphocytes % 8.2      Monocytes % 8.1      Eosinophils % 0.4      Basophils % 0.3      Neutrophils Absolute 6.52 (*)     Immature Granulocytes Absolute, Automated 0.02      Lymphocytes Absolute 0.65 (*)     Monocytes Absolute 0.64      Eosinophils Absolute 0.03      Basophils Absolute 0.02     COMPREHENSIVE METABOLIC PANEL - Abnormal    Glucose 118 (*)     Sodium 139      Potassium 4.4      Chloride 102      Bicarbonate 30      Anion Gap 11      Urea Nitrogen 20      Creatinine 1.05      eGFR 57 (*)     Calcium 10.2      Albumin 3.9      Alkaline Phosphatase 66      Total Protein 7.5      AST 21      Bilirubin, Total  0.7      ALT 10     URINALYSIS WITH REFLEX CULTURE AND MICROSCOPIC - Abnormal    Color, Urine Light-Orange (*)     Appearance, Urine Turbid (*)     Specific Gravity, Urine >1.050 (*)     pH, Urine 5.5      Protein, Urine 30 (1+) (*)     Glucose, Urine Normal      Blood, Urine OVER (3+) (*)     Ketones, Urine NEGATIVE      Bilirubin, Urine NEGATIVE      Urobilinogen, Urine Normal      Nitrite, Urine NEGATIVE      Leukocyte Esterase, Urine NEGATIVE     SERIAL TROPONIN-INITIAL - Abnormal    Troponin I, High Sensitivity 16 (*)     Narrative:     Less than 99th percentile of normal range cutoff-  Female and children under 18 years old <14 ng/L; Male <21 ng/L: Negative  Repeat testing should be performed if clinically indicated.     Female and children under 18 years old 14-50 ng/L; Male 21-50 ng/L:  Consistent with possible cardiac damage and possible increased clinical   risk. Serial measurements may help to assess extent of myocardial damage.     >50 ng/L: Consistent with cardiac damage, increased clinical risk and  myocardial infarction. Serial measurements may help assess extent of   myocardial damage.      NOTE: Children less than 1 year old may have higher baseline troponin   levels and results should be interpreted in conjunction with the overall   clinical context.     NOTE: Troponin I testing is performed using a different   testing methodology at [facility] than at other   [facility]. Direct result comparisons should only   be made within the same method.   SERIAL TROPONIN, 1 HOUR - Abnormal    Troponin I, High Sensitivity 16 (*)     Narrative:     Less than 99th percentile of normal range cutoff-  Female and children under 18 years old <14 ng/L; Male <21 ng/L: Negative  Repeat testing should be performed if clinically indicated.     Female and children under 18 years old 14-50 ng/L; Male 21-50 ng/L:  Consistent with possible cardiac damage and possible increased clinical   risk. Serial  measurements may help to assess extent of myocardial damage.     >50 ng/L: Consistent with cardiac damage, increased clinical risk and  myocardial infarction. Serial measurements may help assess extent of   myocardial damage.      NOTE: Children less than 1 year old may have higher baseline troponin   levels and results should be interpreted in conjunction with the overall   clinical context.     NOTE: Troponin I testing is performed using a different   testing methodology at [facility] than at other   [facility]. Direct result comparisons should only   be made within the same method.   URINALYSIS MICROSCOPIC WITH REFLEX CULTURE - Abnormal    WBC, Urine 11-20 (*)     RBC, Urine >20 (*)     Squamous Epithelial Cells, Urine 1-9 (SPARSE)      Mucus, Urine 1+     URINE CULTURE - Normal    Urine Culture No significant growth     LIPASE - Normal    Lipase 28      Narrative:     Venipuncture immediately after or during the administration of Metamizole may lead to falsely low results. Testing should be performed immediately prior to Metamizole dosing.   URINALYSIS WITH REFLEX CULTURE AND MICROSCOPIC    Narrative:     The following orders were created for panel order Urinalysis with Reflex Culture and Microscopic.  Procedure                               Abnormality         Status                     ---------                               -----------         ------                     Urinalysis with Reflex C...[[id number]]  Abnormal            Final result               Extra Urine Gray Tube[[id number]]                            Final result                 Please view results for these tests on the individual orders.   EXTRA URINE GRAY TUBE    Extra Tube Hold for add-ons.     TROPONIN SERIES- (INITIAL, 1 HR)    Narrative:     The following orders were created for panel order Troponin Series, (0, 1 HR).  Procedure                               Abnormality         Status                     ---------                                -----------         ------                     Troponin I, High Sensiti...[[id number]]  Abnormal            Final result               Troponin, High Sensitivi...[[id number]]  Abnormal            Final result                 Please view results for these tests on the individual orders.       All other labs were within normal range or not returned as of this dictation.    Imaging  CT abdomen pelvis w IV contrast   Final Result   1. Interval development of left hydronephrosis and hydroureter due to   a 4 mm calculus obstructing the left mid ureter at the L4-L5 level.   2. Stable appearance of indeterminate solid appearing lesion in the   lower pole of the right kidney measuring 1.5 cm. Again further   follow-up is recommended to exclude malignancy.   3. Redemonstration of chronic findings including cholelithiasis, few   colonic diverticula and small hiatal hernia.        Signed by: Lee Narayanan 6/11/2024 9:59 AM   Dictation workstation:   [id number]           Procedures  Procedures     EMERGENCY DEPARTMENT COURSE/MDM:     Diagnoses as of 06/12/24 2012   Ureteral stone   Nausea        Medical Decision Making  History obtained for the patient.  Records include labs, imaging, notes reviewed.  Primary concern for possible UTI, kidney stone, musculoskeletal pain.  Patient given morphine with improvement in pain.  Zofran with resolution of nausea, liter fluid bolus given poor oral intake.  CMP, CBC unremarkable.  Lipase within normal limits.  Troponin series minorly elevated but steady at 16.  Urinalysis notable for blood but no evidence of UTI.  CT scan demonstrated a left 4 mm ureteral stone.  Patient given Toradol with improvement in pain.  Patient has known obstructive sleep apnea and is noncompliant with her CPAP.  She had several desaturation events while sleeping in the emergency department.  This not thought to be a new problem.  Patient subsequently discharged home in satisfactory  condition with prescriptions for hydrocodone, ibuprofen, Zofran, Flomax.  She instructed to follow-up with her urologist.  All questions answered and return precautions discussed.    Patient and or family in agreement and understanding of treatment plan.  All questions answered.      I reviewed the case with the attending ED physician. The attending ED physician agrees with the plan. Patient and/or patient´s representative was counseled regarding labs, imaging, likely diagnosis, and plan. All questions were answered.    ED Medications administered this visit:    Medications   lactated Ringer's bolus 1,000 mL (0 mL intravenous Stopped 6/11/24 0841)   ondansetron (Zofran) injection 4 mg (4 mg intravenous Given 6/11/24 0740)   morphine injection 4 mg (4 mg intravenous Given 6/11/24 0740)   iohexol (OMNIPaque) 350 mg iodine/mL solution 90 mL (90 mL intravenous Given 6/11/24 0900)   ketorolac (Toradol) injection 15 mg (15 mg intravenous Given 6/11/24 1135)       New Prescriptions from this visit:    Discharge Medication List as of 6/11/2024 12:25 PM        START taking these medications    Details   HYDROcodone-acetaminophen (Norco) 5-325 mg tablet Take 1 tablet by mouth every 6 hours if needed for severe pain (7 - 10) for up to 12 doses., Starting Tue 6/11/2024, Normal      ondansetron ODT (Zofran-ODT) 4 mg disintegrating tablet Take 1 tablet (4 mg) by mouth every 8 hours if needed for nausea or vomiting for up to 15 doses., Starting Tue 6/11/2024, Normal      tamsulosin (Flomax) 0.4 mg 24 hr capsule Take 1 capsule (0.4 mg) by mouth once daily., Starting Tue 6/11/2024, Until Thu 7/11/2024, Normal             Follow-up:  Adam Dykes MD  [address] [place] [address] [phone]    In 1 week          Final Impression:   1. Ureteral stone    2. Nausea          (Please note that portions of this note were completed with a voice recognition program.  Efforts were made to edit the dictations but occasionally  words are mis-transcribed.)     Narendra Benavides MD  Resident  06/11/24 1152    The patient was seen by the resident/fellow.  I have personally performed a substantive portion of the encounter.  I have seen and examined the patient; agree with the workup, evaluation, MDM, management and diagnosis.  The care plan has been discussed with the resident; I have reviewed the resident’s note and agree with the documented findings.       Min Garcia,   06/12/24 2012

## 2024-06-12 ENCOUNTER — HOSPITAL ENCOUNTER (OUTPATIENT)
Dept: CARDIOLOGY | Facility: CLINIC | Age: 74
Discharge: HOME | End: 2024-06-12
Payer: MEDICARE

## 2024-06-12 DIAGNOSIS — Z95.818 IMPLANTABLE LOOP RECORDER PRESENT: ICD-10-CM

## 2024-06-12 DIAGNOSIS — I48.0 PAROXYSMAL ATRIAL FIBRILLATION (MULTI): ICD-10-CM

## 2024-06-12 LAB — BACTERIA UR CULT: NORMAL

## 2024-06-13 ENCOUNTER — HOSPITAL ENCOUNTER (OUTPATIENT)
Dept: CARDIOLOGY | Facility: CLINIC | Age: 74
Discharge: HOME | End: 2024-06-13
Payer: MEDICARE

## 2024-06-13 DIAGNOSIS — Z95.818 IMPLANTABLE LOOP RECORDER PRESENT: ICD-10-CM

## 2024-06-13 DIAGNOSIS — I48.0 PAROXYSMAL ATRIAL FIBRILLATION (MULTI): ICD-10-CM

## 2024-06-15 LAB
ATRIAL RATE: 63 BPM
Q ONSET: 209 MS
QRS COUNT: 11 BEATS
QRS DURATION: 88 MS
QT INTERVAL: 396 MS
QTC CALCULATION(BAZETT): 405 MS
QTC FREDERICIA: 402 MS
R AXIS: -11 DEGREES
T AXIS: 72 DEGREES
T OFFSET: 407 MS
VENTRICULAR RATE: 63 BPM

## 2024-06-17 ENCOUNTER — HOSPITAL ENCOUNTER (OUTPATIENT)
Dept: CARDIOLOGY | Facility: CLINIC | Age: 74
Discharge: HOME | End: 2024-06-17
Payer: MEDICARE

## 2024-06-17 DIAGNOSIS — Z95.818 IMPLANTABLE LOOP RECORDER PRESENT: ICD-10-CM

## 2024-06-17 DIAGNOSIS — I48.0 PAROXYSMAL ATRIAL FIBRILLATION (MULTI): ICD-10-CM

## 2024-06-17 PROCEDURE — 93298 REM INTERROG DEV EVAL SCRMS: CPT

## 2024-06-18 ENCOUNTER — TREATMENT (OUTPATIENT)
Dept: PHYSICAL THERAPY | Facility: CLINIC | Age: 74
End: 2024-06-18
Payer: MEDICARE

## 2024-06-18 DIAGNOSIS — S76.111S: Primary | ICD-10-CM

## 2024-06-18 PROCEDURE — 97110 THERAPEUTIC EXERCISES: CPT | Mod: GP | Performed by: PHYSICAL THERAPIST

## 2024-06-18 ASSESSMENT — PAIN SCALES - GENERAL: PAINLEVEL_OUTOF10: 5 - MODERATE PAIN

## 2024-06-18 ASSESSMENT — PAIN - FUNCTIONAL ASSESSMENT: PAIN_FUNCTIONAL_ASSESSMENT: 0-10

## 2024-06-18 NOTE — PROGRESS NOTES
"Physical Therapy Treatment    Patient Name: Helen Johnston  MRN: [id number]    Today's Date: 6/18/2024  Time Calculation  Start Time: 1450  Stop Time: 1534  Time Calculation (min): 44 min     PT Therapeutic Procedures Time Entry  Therapeutic Exercise Time Entry: 40                   Insurance:  Visit:  2 of [place]  Auth required: No  Payor: [employer] / Plan: [employer]ICARE / Product Type: *No Product type* /    Certification Dates:  6/5/24 - 9/3/24    Assessment:   Able to progress strengthening without increased sx.  Required rest breaks secondary to abdominal pain.    Plan:   Continue to progress strengthening as tolerated.    Current Problem  1. Strain of right quadriceps muscle, sequela            General  General  Reason for Referral: R quad strain  Referred By: Dr. Elizabeth    Subjective    Hasn't been able to do much since evaluation.  Was hospitalized for kidney stones on 6/11/24.  Has passed the stone but still has some cramping along her L abdomen.    Precautions  Precautions  Medical Precautions: No known precautions/limitation (Medical hx reviewed.  (+) for HTN, asthma, COPD, thyroid disease.  Not likely to impact care.)  Pain  Pain Assessment  Pain Assessment: 0-10  Pain Score: 5 - Moderate pain  Pain Location: Knee  Pain Orientation: Right    Objective   AROM  R knee flexion 104    TREATMENT  THERAPEUTIC EXERCISE:  Recumbent bike seat 12 level 1 for 6mins  LTR 10x  Bridges/glute sets 5\"x10  SLR 10x (with breaks)  LAQ with 2# wt 5\"x10  Seated HS curls Blue TB 10x  Standing hip ABD 10x ea    MANUAL THERAPY:      NEUROMUSCULAR RE-EDUCATION:      THERAPEUTIC ACTIVITY:      MODALITIES:      OP EDUCATION:   Access Code: [email or web address]  URL: [email or web address]  Date: 06/18/2024  Prepared by: Emely Hu    Exercises  - Supine Active Straight Leg Raise  - 2 x daily - 7 x weekly - 1 sets - 10 reps  - Supine Bridge  - 2 x daily - 7 x weekly - 1 sets - 10 reps  - Seated Long Arc Quad  - 2 " x daily - 7 x weekly - 1 sets - 10 reps - 5 hold  - Single Leg Balance with Clock Reach  - 2 x daily - 7 x weekly - 1 sets - 10 reps  - Standing Hip Abduction with Counter Support  - 2 x daily - 7 x weekly - 1 sets - 10 reps    Goals:  1. Pain 0/10  2. Outcomes Measure:  LEFS 40/80  3. RLE strength 5/5 to allow patient to lift leg into car without increased sx.   4. Demonstrates independence with HEP.

## 2024-06-24 ENCOUNTER — HOSPITAL ENCOUNTER (OUTPATIENT)
Dept: CARDIOLOGY | Facility: CLINIC | Age: 74
Discharge: HOME | End: 2024-06-24
Payer: MEDICARE

## 2024-06-24 DIAGNOSIS — Z95.818 IMPLANTABLE LOOP RECORDER PRESENT: ICD-10-CM

## 2024-06-24 DIAGNOSIS — I48.0 PAROXYSMAL ATRIAL FIBRILLATION (MULTI): ICD-10-CM

## 2024-06-27 ENCOUNTER — HOSPITAL ENCOUNTER (OUTPATIENT)
Dept: CARDIOLOGY | Facility: CLINIC | Age: 74
Discharge: HOME | End: 2024-06-27
Payer: MEDICARE

## 2024-06-27 DIAGNOSIS — I48.0 PAROXYSMAL ATRIAL FIBRILLATION (MULTI): ICD-10-CM

## 2024-06-27 DIAGNOSIS — Z95.818 IMPLANTABLE LOOP RECORDER PRESENT: ICD-10-CM

## 2024-06-28 ENCOUNTER — HOSPITAL ENCOUNTER (OUTPATIENT)
Dept: RADIOLOGY | Facility: HOSPITAL | Age: 74
Discharge: HOME | End: 2024-06-28
Payer: MEDICARE

## 2024-06-28 DIAGNOSIS — N20.0 CALCULUS OF KIDNEY: ICD-10-CM

## 2024-06-28 PROCEDURE — 74018 RADEX ABDOMEN 1 VIEW: CPT

## 2024-07-01 ENCOUNTER — HOSPITAL ENCOUNTER (OUTPATIENT)
Dept: CARDIOLOGY | Facility: CLINIC | Age: 74
Discharge: HOME | End: 2024-07-01
Payer: MEDICARE

## 2024-07-01 DIAGNOSIS — Z95.818 IMPLANTABLE LOOP RECORDER PRESENT: ICD-10-CM

## 2024-07-01 DIAGNOSIS — I48.0 PAROXYSMAL ATRIAL FIBRILLATION (MULTI): ICD-10-CM

## 2024-07-02 ENCOUNTER — TREATMENT (OUTPATIENT)
Dept: PHYSICAL THERAPY | Facility: CLINIC | Age: 74
End: 2024-07-02
Payer: MEDICARE

## 2024-07-02 DIAGNOSIS — S76.111S: Primary | ICD-10-CM

## 2024-07-02 PROCEDURE — 97110 THERAPEUTIC EXERCISES: CPT | Mod: GP | Performed by: PHYSICAL THERAPIST

## 2024-07-02 ASSESSMENT — PAIN SCALES - GENERAL: PAINLEVEL_OUTOF10: 3

## 2024-07-02 ASSESSMENT — PAIN - FUNCTIONAL ASSESSMENT: PAIN_FUNCTIONAL_ASSESSMENT: 0-10

## 2024-07-08 ENCOUNTER — HOSPITAL ENCOUNTER (OUTPATIENT)
Dept: CARDIOLOGY | Facility: CLINIC | Age: 74
Discharge: HOME | End: 2024-07-08
Payer: MEDICARE

## 2024-07-08 DIAGNOSIS — I48.0 PAROXYSMAL ATRIAL FIBRILLATION (MULTI): ICD-10-CM

## 2024-07-08 DIAGNOSIS — Z95.818 IMPLANTABLE LOOP RECORDER PRESENT: ICD-10-CM

## 2024-07-09 ENCOUNTER — TREATMENT (OUTPATIENT)
Dept: PHYSICAL THERAPY | Facility: CLINIC | Age: 74
End: 2024-07-09
Payer: MEDICARE

## 2024-07-09 ENCOUNTER — OFFICE VISIT (OUTPATIENT)
Dept: WOUND CARE | Facility: CLINIC | Age: 74
End: 2024-07-09
Payer: MEDICARE

## 2024-07-09 DIAGNOSIS — S76.111S: Primary | ICD-10-CM

## 2024-07-09 PROCEDURE — 29581 APPL MULTLAYER CMPRN SYS LEG: CPT

## 2024-07-09 PROCEDURE — 99213 OFFICE O/P EST LOW 20 MIN: CPT | Mod: 25

## 2024-07-09 PROCEDURE — 97110 THERAPEUTIC EXERCISES: CPT | Mod: GP | Performed by: PHYSICAL THERAPIST

## 2024-07-09 ASSESSMENT — PAIN SCALES - GENERAL: PAINLEVEL_OUTOF10: 1

## 2024-07-09 ASSESSMENT — PAIN - FUNCTIONAL ASSESSMENT: PAIN_FUNCTIONAL_ASSESSMENT: 0-10

## 2024-07-09 NOTE — PROGRESS NOTES
"Physical Therapy Treatment    Patient Name: Helen Johnston  MRN: [id number]    Today's Date: 7/9/2024  Time Calculation  Start Time: 1136  Stop Time: 1214  Time Calculation (min): 38 min     PT Therapeutic Procedures Time Entry  Therapeutic Exercise Time Entry: 38                   Insurance:  Visit:  4 of [place]  Auth required: No  Payor: [employer] / Plan: [employer] MEDICARE / Product Type: *No Product type* /    Certification Dates:  6/5/24 - 9/3/24    Assessment:   Demonstrates understanding of HEP.    Plan:   Demonstrates ability to continue with independent home program.  Discharge to [facility].     Current Problem  1. Strain of right quadriceps muscle, sequela            General  General  Reason for Referral: R quad strain  Referred By: Dr. Elizabeth    Subjective    Knee is feeling better.  Still struggles to get it in and out of car but no pain with it.  Feels like she is good to be done with PT today.    Precautions  Precautions  Medical Precautions: No known precautions/limitation (Medical hx reviewed.  (+) for HTN, asthma, COPD, thyroid disease.  Not likely to impact care.)    Pain  Pain Assessment  Pain Assessment: 0-10  0-10 (Numeric) Pain Score: 1  Pain Location: Knee  Pain Orientation: Right    Objective   AROM  R knee flexion 104    TREATMENT  THERAPEUTIC EXERCISE:  Recumbent bike seat 12 level 1 for 5mins  LAQ with 5\"x10 ea  Sit <>stand from plinth 10x2  SLS with 3-way tap 10x ea  Seated marching up and over cone 3x3  Standing hip extn 10x ea  Standing hip ABD 10x ea  Mini squat standing at counter 10x    MANUAL THERAPY:      NEUROMUSCULAR RE-EDUCATION:      THERAPEUTIC ACTIVITY:      MODALITIES:      OP EDUCATION:   Access Code: [email or web address]  URL: [email or web address]  Date: 07/02/2024  Prepared by: Emely Hu    Exercises  - Supine Active Straight Leg Raise  - 2 x daily - 7 x weekly - 1 sets - 10 reps  - Supine Bridge  - 2 x daily - 7 x weekly - 1 sets - 10 reps  - Seated Long Arc " Quad  - 2 x daily - 7 x weekly - 1 sets - 10 reps - 5 hold  - Single Leg Balance with Clock Reach  - 2 x daily - 7 x weekly - 1 sets - 10 reps  - Standing Hip Abduction with Counter Support  - 2 x daily - 7 x weekly - 1 sets - 10 reps  - Hooklying Clamshell with Resistance  - 2 x daily - 7 x weekly - 1 sets - 10 reps  - Mini Squat with Counter Support  - 2 x daily - 7 x weekly - 1 sets - 10 reps    Goals:  1. Pain 0/10  2. Outcomes Measure:  LEFS 40/80  3. RLE strength 5/5 to allow patient to lift leg into car without increased sx.   4. Demonstrates independence with HEP.

## 2024-07-11 ENCOUNTER — HOSPITAL ENCOUNTER (OUTPATIENT)
Dept: CARDIOLOGY | Facility: CLINIC | Age: 74
Discharge: HOME | End: 2024-07-11
Payer: MEDICARE

## 2024-07-11 DIAGNOSIS — Z95.818 IMPLANTABLE LOOP RECORDER PRESENT: ICD-10-CM

## 2024-07-11 DIAGNOSIS — I48.0 PAROXYSMAL ATRIAL FIBRILLATION (MULTI): ICD-10-CM

## 2024-07-15 ENCOUNTER — HOSPITAL ENCOUNTER (OUTPATIENT)
Dept: CARDIOLOGY | Facility: CLINIC | Age: 74
Discharge: HOME | End: 2024-07-15
Payer: MEDICARE

## 2024-07-15 DIAGNOSIS — I48.0 PAROXYSMAL ATRIAL FIBRILLATION (MULTI): ICD-10-CM

## 2024-07-15 DIAGNOSIS — Z95.818 IMPLANTABLE LOOP RECORDER PRESENT: ICD-10-CM

## 2024-07-16 ENCOUNTER — OFFICE VISIT (OUTPATIENT)
Dept: WOUND CARE | Facility: CLINIC | Age: 74
End: 2024-07-16
Payer: MEDICARE

## 2024-07-16 PROCEDURE — 99212 OFFICE O/P EST SF 10 MIN: CPT

## 2024-07-25 ENCOUNTER — HOSPITAL ENCOUNTER (OUTPATIENT)
Dept: CARDIOLOGY | Facility: CLINIC | Age: 74
Discharge: HOME | End: 2024-07-25
Payer: MEDICARE

## 2024-07-25 DIAGNOSIS — I48.0 PAROXYSMAL ATRIAL FIBRILLATION (MULTI): ICD-10-CM

## 2024-07-25 DIAGNOSIS — Z95.818 IMPLANTABLE LOOP RECORDER PRESENT: ICD-10-CM

## 2024-07-25 PROCEDURE — 93298 REM INTERROG DEV EVAL SCRMS: CPT

## 2024-07-25 PROCEDURE — 93298 REM INTERROG DEV EVAL SCRMS: CPT | Performed by: STUDENT IN AN ORGANIZED HEALTH CARE EDUCATION/TRAINING PROGRAM

## 2024-07-29 ENCOUNTER — HOSPITAL ENCOUNTER (OUTPATIENT)
Dept: CARDIOLOGY | Facility: CLINIC | Age: 74
Discharge: HOME | End: 2024-07-29
Payer: MEDICARE

## 2024-07-29 DIAGNOSIS — Z95.818 IMPLANTABLE LOOP RECORDER PRESENT: ICD-10-CM

## 2024-07-29 DIAGNOSIS — I48.0 PAROXYSMAL ATRIAL FIBRILLATION (MULTI): ICD-10-CM

## 2024-07-29 PROCEDURE — 93298 REM INTERROG DEV EVAL SCRMS: CPT

## 2024-07-30 ENCOUNTER — HOSPITAL ENCOUNTER (OUTPATIENT)
Dept: CARDIOLOGY | Facility: CLINIC | Age: 74
Discharge: HOME | End: 2024-07-30
Payer: MEDICARE

## 2024-07-30 DIAGNOSIS — I48.0 PAROXYSMAL ATRIAL FIBRILLATION (MULTI): ICD-10-CM

## 2024-07-30 DIAGNOSIS — Z95.818 IMPLANTABLE LOOP RECORDER PRESENT: ICD-10-CM

## 2024-07-31 ENCOUNTER — HOSPITAL ENCOUNTER (OUTPATIENT)
Dept: CARDIOLOGY | Facility: CLINIC | Age: 74
Discharge: HOME | End: 2024-07-31
Payer: MEDICARE

## 2024-07-31 DIAGNOSIS — Z95.818 IMPLANTABLE LOOP RECORDER PRESENT: ICD-10-CM

## 2024-07-31 DIAGNOSIS — I48.0 PAROXYSMAL ATRIAL FIBRILLATION (MULTI): ICD-10-CM

## 2024-08-05 ENCOUNTER — HOSPITAL ENCOUNTER (OUTPATIENT)
Dept: CARDIOLOGY | Facility: CLINIC | Age: 74
Discharge: HOME | End: 2024-08-05
Payer: MEDICARE

## 2024-08-05 DIAGNOSIS — Z95.818 IMPLANTABLE LOOP RECORDER PRESENT: ICD-10-CM

## 2024-08-05 DIAGNOSIS — I48.0 PAROXYSMAL ATRIAL FIBRILLATION (MULTI): ICD-10-CM

## 2024-08-19 ENCOUNTER — HOSPITAL ENCOUNTER (OUTPATIENT)
Dept: CARDIOLOGY | Facility: CLINIC | Age: 74
Discharge: HOME | End: 2024-08-19
Payer: MEDICARE

## 2024-08-19 DIAGNOSIS — Z95.818 IMPLANTABLE LOOP RECORDER PRESENT: ICD-10-CM

## 2024-08-19 DIAGNOSIS — I48.0 PAROXYSMAL ATRIAL FIBRILLATION (MULTI): ICD-10-CM

## 2024-08-21 ENCOUNTER — HOSPITAL ENCOUNTER (OUTPATIENT)
Dept: CARDIOLOGY | Facility: CLINIC | Age: 74
Discharge: HOME | End: 2024-08-21
Payer: MEDICARE

## 2024-08-21 DIAGNOSIS — Z95.818 IMPLANTABLE LOOP RECORDER PRESENT: ICD-10-CM

## 2024-08-21 DIAGNOSIS — I48.0 PAROXYSMAL ATRIAL FIBRILLATION (MULTI): ICD-10-CM

## 2024-08-26 ENCOUNTER — HOSPITAL ENCOUNTER (OUTPATIENT)
Dept: CARDIOLOGY | Facility: CLINIC | Age: 74
Discharge: HOME | End: 2024-08-26
Payer: MEDICARE

## 2024-08-26 DIAGNOSIS — I48.0 PAROXYSMAL ATRIAL FIBRILLATION (MULTI): ICD-10-CM

## 2024-08-26 DIAGNOSIS — Z95.818 IMPLANTABLE LOOP RECORDER PRESENT: ICD-10-CM

## 2024-09-09 NOTE — PROGRESS NOTES
Patient: Helen Johnston    [id number]  : 1950 -- AGE 73 y.o.    Provider: Bhavana Lopez MD     Location [facility], [place], operated by [facility]   Service Date: 9/10/2024            [facility]  New Visit Note      HISTORY OF PRESENT ILLNESS     The patient's referring provider is: Otto Rojas MD    REASON FOR VISIT:   Chief Complaint   Patient presents with    Pt. here today for NPV    Sleep Apnea        HISTORY OF PRESENT ILLNESS   Ms. Helen Johnston is a 73 y.o. female with past medical history of paroxysmal atrial fibrillation, idiopathic cardiomyopathy/HFpEF, GERD, hypothyroidism, mild intermittent asthma, HLD, HTN who presents to a [facility] for a sleep medicine evaluation with concerns of sleepiness and sleep apnea.       CURRENT HISTORY  On today's visit, the patient reports A-fib started in . Required vancomycin infusion and had a reaction, needed to be cardioverted 8 times and had an ablation .  Has an implanted loop recorder, followed by Dr. Rojas who referred her to sleep medicine.  Her A-fib is intermittent with longest duration of 18 min or so per patient report.    Diagnosed with JENI in , unsure of severity. Previously had a CPAP (managed by Dr. Young), but has not used it in 4 years at least. Thinks her setting was 13-16 cmH2O. Historically had poor CPAP compliance with FFM, unable to keep mask on longer than 3 hours/night.  Today she brought an Airsense 9 with her.    Over the past five years, the patient's weight has  not significantly changed.  She lost ~20 lbs in .    Sleep schedule:  In bed: 10 PM  Activities in bed:   Bedtime Activities: use a computer/tablet/smartphone and TV  Subjective sleep latency:  10 min - 2 hours (if TV on)  Awakenings during night: 1-2x/night  Length of awakenings: 10 min to 1 hr (sometimes will go play on computer)  Final awakening time: 7 AM  Out of bed: 7-9 AM  Overall estimate  of total sleep time: 9 hrs, sleeps well if she takes her nighttime medications    Weekends: same  Preferred sleeping position: sidelying, elevated head of bed  Typically sleeps alone.     Issues with sleep onset or maintenance: not significantly.  Usually due to her    Associated sleep symptoms:  Breathing during sleep: snoring and mouth breathing  Behaviors at night: No   Sleep paralysis: No   Hypnogogic or hypnopompic hallucinations: No   Cataplexy: No     Leg symptoms and timing:  - Sensations: Patient does not have unusual sensations in their extremities that cause an urge to move them     Sleep environment:  Pets in bed :  Yes  Bedroom temperature: WNL  Noise :   No   Issues with bed comfort :   No       Daytime Symptoms:  On awakening patient reports: wake unrefreshed, morning dry mouth, and hard to get out of bed    Daytime: During the day, she does doze unintentionally while inactive.  During more active tasks, she sometimes is drowsy.  With regards to daytime napping, the patient reports sometimes taking naps. If she takes a nap, typically she awakens refreshed.  She does not report that poor sleep results in mood-related irritability. She does not report that poor sleep results in issues with memory/concentration.    Driving History: She is retired, but drives still.  She is not a . With driving, the patient admits to sleepy driving, with 0 sleepiness-related motor vehicle accidents and 0 near misses.  She will take a quick nap at a rest stop, open windows, turn on AC, or talk on the phone.    ESS: 16  SALVADOR: 11  FOSQ:  30      REVIEW OF SYSTEMS     REVIEW OF SYSTEMS  Review of Systems  SLEEP ROS: snoring, periods of not breathing, tossing and turning, excessive daytime sleepiness, congested nose, feels sleepy during the day, take naps during the day      ALLERGIES AND MEDICATIONS     ALLERGIES  Allergies   Allergen Reactions    Lisinopril Anaphylaxis    Thiopental Shortness of breath     Vancomycin Shortness of breath    Cefuroxime Unknown    Erythromycin Swelling and Rash    Linezolid Itching and Swelling    Procainamide Other      Heart racing with dental procedure    Procaine Unknown    Silver Sulfadiazine Swelling and Other     SKIN BURNING       MEDICATIONS  Current Outpatient Medications   Medication Sig Dispense Refill    albuterol 5 mg/mL nebulizer solution Inhale 1 mL (5 mg).      albuterol 90 mcg/actuation inhaler Inhale 2 puffs every 6 hours if needed.      atorvastatin (Lipitor) 20 mg tablet Take 1 tablet (20 mg) by mouth once daily.      hydrocortisone-iodoquinoL (Dermazene) 1-1 % cream in packet Apply topically.      levothyroxine (Synthroid, Levoxyl) 50 mcg tablet Take 1 tablet (50 mcg) by mouth once daily.      losartan (Cozaar) 25 mg tablet Take 1 tablet (25 mg) by mouth once daily at bedtime.      loteprednol (Lotemax) 0.5 % ophthalmic suspension 1 drop.      metoprolol succinate XL (Toprol-XL) 50 mg 24 hr tablet Take 1 tablet (50 mg) by mouth once daily at bedtime. 90 tablet 3    neomycin-bacitracin-polymyxin-hydrocortisone (Cortisporin) 3.5-400-10,000 mg-unit/g-1% ophthalmic ointment Apply to affected eye(s).      nystatin (Mycostatin) 100,000 unit/gram powder Apply topically. Apply to affected areas 2-3 times daily      olopatadine (Patanol) 0.1 % ophthalmic solution Administer into affected eye(s) every 12 hours.      rivaroxaban (Xarelto) 20 mg tablet Take 1 tablet (20 mg) by mouth once daily in the evening. Take with meals. 90 tablet 3    spironolactone (Aldactone) 25 mg tablet Take 1 tablet (25 mg) by mouth once daily.      urea (Carmol) 40 % cream Apply topically.      bacitracin ophthalmic ointment APPLY 1/4 INCH TO BOTH EYES AT BEDTIME      cetirizine (ZYRTEC) 10 mg capsule Take by mouth.      cholecalciferol (Vitamin D-3) 50,000 unit capsule Take 1 capsule (50,000 Units) by mouth once a week.      dofetilide (Tikosyn) 500 mcg capsule Take 1 capsule (500 mcg) by mouth 2  times a day. 180 capsule 3    dyclonine (Sucrets) 2 mg lozenge Place 1 lozenge into mouth between cheek and gum.      meloxicam (Mobic) 7.5 mg tablet Take 1 tablet (7.5 mg) by mouth once daily.      miSOPROStoL (Cytotec) 200 mcg tablet 1 tablet (200 mcg).      pantoprazole (ProtoNix) 40 mg EC tablet Take 1 tablet (40 mg) by mouth once daily in the morning. Take before meals.      peg 400-propylene glycol (Systane, propylene glycoL,) 0.4-0.3 % drops ophthalmic drops 1 each.      prednisoLONE acetate (Pred-Forte) 1 % ophthalmic suspension Administer into affected eye(s) 2 times a day as needed.      tobramycin-dexamethasone (Tobradex) ophthalmic ointment Place into both eyes nightly Apply to both eyes nightly.      triamcinolone (Kenalog) 0.1 % ointment Apply topically.       No current facility-administered medications for this visit.     PAST HISTORY     PAST MEDICAL HISTORY  She  has a past medical history of Diabetes mellitus (Multi).     PAST SURGICAL HISTORY:  No past surgical history on file.    FAMILY HISTORY  No family history on file.  She does not have a family history of sleep disorder (e.g., sleep apnea, narcolepsy in any first degree relatives).      SOCIAL HISTORY  She  reports that she has never smoked. She has never used smokeless tobacco. She reports that she does not drink alcohol and does not use drugs. She currently lives alone.     Work history: The patient currently has been retired, but previously worked as  in [place].     Caffeine consumption: Yes - tea or 1 coke daily  Alcohol consumption: Yes - only 1x/year  Smoking: No  Marijuana: No      PHYSICAL EXAM     Physical Examination: BP 97/61   Pulse 97   Temp 36.8 °C (98.2 °F)   Wt 112 kg (248 lb)   SpO2 95% Comment: RA  BMI 43.93 kg/m²     PREVIOUS WEIGHTS:  Wt Readings from Last 3 Encounters:   09/10/24 112 kg (248 lb)   06/11/24 119 kg (262 lb)   05/13/24 119 kg (262 lb)       General: The patient is a  "pleasant female, in no acute distress. HEENT: She has a  a modified Mallampati grade  3-4  airway with Numbers; 0-4 (with +): No tonsils bilaterally. The soft palate was low hanging and the uvula was thickened. The A/P diameter of the velopharynx was not narrowed. The oropharynx was not shallow in the A/P diameter. Lateral wall narrowing was mildly present. Tongue ridging was present.  Retrognathia  and micrognathia  present. Neck: The neck was enlarged. No JVP, bruits or lymphadenopathy was appreciated. Chest: Clear to auscultation. No wheezes, rales, or rhonchi. Cardiovascular: Regular rate and rhythm. No murmurs, gallops, or clicks. Abdomen: Soft, nontender, nondistended. Positive bowel sounds. Extremities: No clubbing, cyanosis, or edema is noted. Neurologic exam: Alert, oriented x3 and was grossly non-focal.    RESULTS/DATA     No results found for: \"IRON\", \"TRANSFERRIN\", \"IRONSAT\", \"TIBC\", \"FERRITIN\"    Bicarbonate (mmol/L)   Date Value   06/11/2024 30   12/28/2023 34 (H)   12/19/2023 33 (H)       DIAGNOSES     Problem List and Orders  Diagnoses and all orders for this visit:  Sleep apnea, unspecified type  -     Referral to Adult Sleep Medicine  -     Home sleep test; Future  Paroxysmal atrial fibrillation (Multi)  -     Referral to Adult Sleep Medicine  -     Home sleep test; Future  Obesity, Class III, BMI 40-49.9 (morbid obesity) (Multi)    ASSESSMENT/PLAN     Ms. Johnston is a 73 y.o. female and with past medical history of paroxysmal atrial fibrillation, JENI, morbid obesity, idiopathic cardiomyopathy/HFpEF, GERD, hypothyroidism, mild intermittent asthma, HLD, HTN. She presents to  the [facility] to address JENI.  Historically, she was diagnosed with JENI of unknown severity in 2006 and was using CPAP; however, she struggled with compliance due to discomfort.  She has not been using CPAP for the last 3 to 4 years and was referred here by her cardiologist.  We will proceed with " HSAT to determine severity of JENI and CPAP therapy if she meets criteria.  Patient expressed understanding and was agreeable with plan.    #Sleep apnea  - History of JENI and difficulty with CPAP compliance  - Proceed with HSAT and CPAP therapy if criteria met.  -Discussed new innovations with CPAP and potential for desensitization therapy if needed.    #Paroxysmal atrial fibrillation  - Discussed with patient the relationship between atrial fibrillation and JENI  - Follow up with cardiology    #Obesity  - Discussed the role of weight management  in JENI  - Encourage patient to promote healthy lifestyle habits    Follow up: 3 months    Patient was staffed with Dr. Pablo Lopez MD    Sleep Medicine Fellow  Division of Pulmonology and Sleep Medicine  [facility]         ----------------------------------------------------------------------------------------------------------------------  9/12/2024  12:33 PM ::     Written by Melanie Shah MD, PhD    Briefly, Ms. Johnston, a 73 y.o. female, was evaluated at the [facility] for sleep apnea.     I personally saw and evaluated the patient. I discussed the patient with the Fellow and agree with their note which accurately reflects my own findings and plan. In summary, patient with prior hx of JENI but not currently on treatment. Has hx of afib and looking to optimize management through treatment of JENI. Plan for HSAT and then order new CPAP device and work on pathways to improve and maintain adherence.    Please refer to the full clinic note for specific details.    Melanie Shah MD, PhD  Division of Pulmonary, Critical Care, and Sleep Medicine  Clinical Associate Professor of Medicine, [facility]  , Sleep Medicine  System Director,  Sleep  Medicine    ----------------------------------------------------------------------------------------------------------------------

## 2024-09-10 ENCOUNTER — OFFICE VISIT (OUTPATIENT)
Dept: SLEEP MEDICINE | Facility: HOSPITAL | Age: 74
End: 2024-09-10
Payer: MEDICARE

## 2024-09-10 VITALS
SYSTOLIC BLOOD PRESSURE: 97 MMHG | BODY MASS INDEX: 43.93 KG/M2 | TEMPERATURE: 98.2 F | DIASTOLIC BLOOD PRESSURE: 61 MMHG | OXYGEN SATURATION: 95 % | WEIGHT: 248 LBS | HEART RATE: 97 BPM

## 2024-09-10 DIAGNOSIS — I48.0 PAROXYSMAL ATRIAL FIBRILLATION (MULTI): ICD-10-CM

## 2024-09-10 DIAGNOSIS — E66.01 OBESITY, CLASS III, BMI 40-49.9 (MORBID OBESITY) (MULTI): ICD-10-CM

## 2024-09-10 DIAGNOSIS — G47.30 SLEEP APNEA, UNSPECIFIED TYPE: Primary | ICD-10-CM

## 2024-09-10 PROCEDURE — 1159F MED LIST DOCD IN RCRD: CPT | Performed by: INTERNAL MEDICINE

## 2024-09-10 PROCEDURE — 99204 OFFICE O/P NEW MOD 45 MIN: CPT | Performed by: INTERNAL MEDICINE

## 2024-09-10 PROCEDURE — 1126F AMNT PAIN NOTED NONE PRSNT: CPT | Performed by: INTERNAL MEDICINE

## 2024-09-10 PROCEDURE — G2211 COMPLEX E/M VISIT ADD ON: HCPCS | Performed by: INTERNAL MEDICINE

## 2024-09-10 PROCEDURE — 1036F TOBACCO NON-USER: CPT | Performed by: INTERNAL MEDICINE

## 2024-09-10 PROCEDURE — 3078F DIAST BP <80 MM HG: CPT | Performed by: INTERNAL MEDICINE

## 2024-09-10 PROCEDURE — 3074F SYST BP LT 130 MM HG: CPT | Performed by: INTERNAL MEDICINE

## 2024-09-10 PROCEDURE — 99214 OFFICE O/P EST MOD 30 MIN: CPT | Mod: GC | Performed by: INTERNAL MEDICINE

## 2024-09-10 ASSESSMENT — LIFESTYLE VARIABLES
HOW OFTEN DO YOU HAVE SIX OR MORE DRINKS ON ONE OCCASION: NEVER
HOW MANY STANDARD DRINKS CONTAINING ALCOHOL DO YOU HAVE ON A TYPICAL DAY: 1 OR 2
AUDIT-C TOTAL SCORE: 1
SKIP TO QUESTIONS 9-10: 1
HOW OFTEN DO YOU HAVE A DRINK CONTAINING ALCOHOL: MONTHLY OR LESS

## 2024-09-10 ASSESSMENT — PAIN SCALES - GENERAL: PAINLEVEL: 0-NO PAIN

## 2024-09-10 NOTE — PATIENT INSTRUCTIONS
[facility]  [facility]  [address]  [facility] [address]  [phone]       NAME: Helen Johnston   DATE: 09/10/24    Your Sleep Provider Today: Melanie Shah MD PhD  Your Primary Care Physician: Elsie Ziegler, DO   Your Referring Provider: Otto Rojas MD    DIAGNOSIS:   1. Sleep apnea, unspecified type  Referral to Adult Sleep Medicine    Home sleep test      2. Paroxysmal atrial fibrillation (Multi)  Referral to Adult Sleep Medicine    Home sleep test          Thank you for coming to the Sleep Medicine Clinic today! Your sleep medicine provider today was: Melanie Shah MD PhD Below is a summary of your treatment plan, other important information, and our contact numbers:      TREATMENT PLAN     [facility] will call you to  the home sleep apnea test with instructions on how to use it.  We will call you with results and start CPAP therapy if needed.    Follow-up Appointment:   3 months      IMPORTANT INFORMATION     Call 911 for medical emergencies.  Our offices are generally open from Monday-Friday, 9 am - 5 pm.  If you need to get in touch with me, you may either call me and my team(number is below) or you can use FastConnect.  If a referral for a test, for CPAP, or for another specialist was made, and you have not heard about scheduling this within a week, please call scheduling at [phone] ([phone]).  If you are unable to make your appointment for clinic or an overnight study, kindly call the office at least 48 hours in advance to cancel and reschedule.  If you are on CPAP, please bring your device's card or the device to each clinic appointment.   There are no supporting services by either the sleep doctors or their staff on weekends and Holidays, or after 5 PM on weekdays.   If you have been asked to come to a sleep study, make sure you bring toiletries, a comfy pillow, and any nighttime medications that  you may regularly take. Also be sure to eat dinner before you arrive. We generally do not provide meals.      PRESCRIPTIONS     We require 7 days advanced notice for prescription refills. If we do not receive the request in this time, we cannot guarantee that your medication will be refilled in time.      IMPORTANT PHONE NUMBERS     Sleep Medicine Clinic Fax: [phone]  Appointments (for Adult Sleep Clinic): [phone] - option 2  Appointments (For Sleep Studies): [phone] - option 3  Behavioral Sleep Medicine: [phone]  Sleep Surgery: [phone]  ENT (Otolaryngology): [phone]  Headache Clinic (Neurology): [phone]  Neurology: [phone]  Psychiatry: [phone]  Pulmonary Function Testing (PFT) Center: [phone]  Pulmonary Medicine: [phone]  [employer] (DME): [phone]  [employer] (DME): [phone]   ([facility]): [phone]      OUR ADULT SLEEP MEDICINE TEAM   Please do not hesitate to call the office or sleep nurse with any questions between appointments:    Adult Sleep Nurses (Mehnaz Howe, RN and Radha Tolliver RN):  For clinical questions and refilling prescriptions: [phone]  Email sleep diaries and other documents at: [email or web address]    Adult Sleep Medicine Secretaries:  Beverley Lackey (For Sanchez/Larkin/Krise/Strohl/Yeh/Cotton):   P: [phone]  F: [phone]  Gloria Davis (For Shah/Guggenbiller): P: [phone]  Fax: [phone]  Maribel Henderson (For Jurcevic/Blank): P: [phone]  F: [phone]  Marielos Dixon (For Henrique): P: [phone]  F: [phone]  Valerie Kent (For Joan/Jemma/Zakhary): P: [phone]  F: [phone]  Ginny Lewis (For Varghese/Linus): P: [phone]  F: [phone]     Adult Sleep Medicine Advanced Practice Providers:  Cas Hu ([place], [place])  Pari Easton ([facility])  Kimmy Velásquez CNP ([facility],  "[place])  Debra Duenas CNP ([place], [place], [place])  Marge Duenas ([place], [place], [place])  Guillermo Barriga CNP ([place], [employer])        OUR SLEEP TESTING LOCATIONS     Our team will contact you to schedule your sleep study, however, you can contact us as follow:   [place] (5 years and older; younger considered on case-by-case basis): [address] [facility]; [address], [address]. Scheduling  After hours line: [phone]      CONTACTING YOUR SLEEP MEDICINE PROVIDER     Send a message directly to your provider through \"My Chart\", which is the email service through your  Records Account: [email or web address]   Call [phone] and leave a message. One of the administrative assistants will forward the message to your sleep medicine provider through \"My Chart\" and/or email.     Your sleep medicine provider for this visit was: Melanie Shah MD PhD    "

## 2024-09-12 ENCOUNTER — OFFICE VISIT (OUTPATIENT)
Dept: PRIMARY CARE | Facility: CLINIC | Age: 74
End: 2024-09-12
Payer: MEDICARE

## 2024-09-12 VITALS
DIASTOLIC BLOOD PRESSURE: 72 MMHG | TEMPERATURE: 97.9 F | RESPIRATION RATE: 16 BRPM | OXYGEN SATURATION: 92 % | SYSTOLIC BLOOD PRESSURE: 128 MMHG | WEIGHT: 252.4 LBS | HEART RATE: 88 BPM | BODY MASS INDEX: 44.72 KG/M2 | HEIGHT: 63 IN

## 2024-09-12 DIAGNOSIS — G47.33 OBSTRUCTIVE SLEEP APNEA SYNDROME: ICD-10-CM

## 2024-09-12 DIAGNOSIS — I50.32 CHRONIC DIASTOLIC HEART FAILURE (MULTI): ICD-10-CM

## 2024-09-12 DIAGNOSIS — I48.0 PAROXYSMAL ATRIAL FIBRILLATION (MULTI): ICD-10-CM

## 2024-09-12 DIAGNOSIS — R10.32 LEFT LOWER QUADRANT ABDOMINAL PAIN: ICD-10-CM

## 2024-09-12 DIAGNOSIS — I10 BENIGN ESSENTIAL HYPERTENSION: ICD-10-CM

## 2024-09-12 DIAGNOSIS — J45.20 MILD INTERMITTENT ASTHMA WITHOUT COMPLICATION (HHS-HCC): ICD-10-CM

## 2024-09-12 DIAGNOSIS — H60.502 ACUTE OTITIS EXTERNA OF LEFT EAR, UNSPECIFIED TYPE: Primary | ICD-10-CM

## 2024-09-12 DIAGNOSIS — R60.9 DEPENDENT EDEMA: ICD-10-CM

## 2024-09-12 PROBLEM — M25.552 LEFT HIP PAIN: Status: RESOLVED | Noted: 2020-11-24 | Resolved: 2024-09-12

## 2024-09-12 PROBLEM — M25.511 CHRONIC RIGHT SHOULDER PAIN: Status: RESOLVED | Noted: 2021-07-14 | Resolved: 2024-09-12

## 2024-09-12 PROBLEM — E66.01 SEVERE OBESITY (MULTI): Status: RESOLVED | Noted: 2022-09-14 | Resolved: 2024-09-12
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
## 2024-09-12 PROBLEM — I20.89 ANGINA AT REST (CMS-HCC): Status: RESOLVED | Noted: 2023-08-27 | Resolved: 2024-09-12

## 2024-09-12 PROBLEM — I51.9 MILD DIASTOLIC DYSFUNCTION: Status: RESOLVED | Noted: 2023-10-30 | Resolved: 2024-09-12

## 2024-09-12 PROBLEM — R73.09 IMPAIRED GLUCOSE METABOLISM: Status: RESOLVED | Noted: 2017-01-18 | Resolved: 2024-09-12

## 2024-09-12 PROBLEM — G89.29 CHRONIC RIGHT SHOULDER PAIN: Status: RESOLVED | Noted: 2021-07-14 | Resolved: 2024-09-12

## 2024-09-12 PROCEDURE — 3078F DIAST BP <80 MM HG: CPT

## 2024-09-12 PROCEDURE — 3008F BODY MASS INDEX DOCD: CPT

## 2024-09-12 PROCEDURE — 99214 OFFICE O/P EST MOD 30 MIN: CPT

## 2024-09-12 PROCEDURE — 1159F MED LIST DOCD IN RCRD: CPT

## 2024-09-12 PROCEDURE — 3074F SYST BP LT 130 MM HG: CPT

## 2024-09-12 PROCEDURE — 1036F TOBACCO NON-USER: CPT

## 2024-09-12 RX ORDER — CIPROFLOXACIN AND DEXAMETHASONE 3; 1 MG/ML; MG/ML
4 SUSPENSION/ DROPS AURICULAR (OTIC) 2 TIMES DAILY
Qty: 7.5 ML | Refills: 0 | Status: SHIPPED | OUTPATIENT
Start: 2024-09-12 | End: 2024-09-19

## 2024-09-12 RX ORDER — LOTILANER OPHTHALMIC SOLUTION 2.5 MG/ML
1 SOLUTION/ DROPS OPHTHALMIC EVERY 12 HOURS
COMMUNITY
Start: 2024-08-30 | End: 2024-10-10

## 2024-09-12 NOTE — ASSESSMENT & PLAN NOTE
Patient cannot tolerate Lasix and often cannot tolerate spironolactone due to urinary urgency and frequency.  Recommended again that patient wear compression stockings to the knee regularly.

## 2024-09-12 NOTE — PROGRESS NOTES
"Subjective   Helen Johnston is a 73 y.o. female   Patient reports that the inside of her left ear has felt itchy and dry for about the last month.  At 1 point she used hydrogen peroxide on a Q-tip in the ear.  After that it felt more dry but did not make it that much less itchy.  She denies any ear pain or loss of hearing in the ear.  Of note, she did get a COVID infection about 3 weeks ago, however the ear itchiness began before the COVID.  She has never had this issue before.    She reports left lower quadrant abdominal pain with exertion.  She has no known hernia in that location.  It is not associated with eating or bowel movements.  She denies any constipation, diarrhea, nausea, vomiting.    Her dog scratched her legs again recently.  There was a day or 2 where she thought it might be a little bit more red than usual however then she took a couple of leftover doxycycline tablets that she had at home and the redness started to improve.  She still has some swelling in her bilateral lower legs however it is at baseline.  She wears compression stockings sometimes.    Objective   /72   Pulse 88   Temp 36.6 °C (97.9 °F)   Resp 16   Ht 1.6 m (5' 3\")   Wt 114 kg (252 lb 6.4 oz)   SpO2 92%   BMI 44.71 kg/m²    PHYSICAL EXAM  Gen: Well appearing, in NAD, walks with a walker  Eyes: EOMI  HEENT: MMM. Throat normal.  There is erythema, crusting, dry skin in the left ear canal.  It looks as though her scratching has broken the skin open as well.  There is no drainage.  The TM is normal.  The external ear appears normal.  Heart: RRR, no murmurs  Lungs: No increased work of breathing, CTAB, on RA  GI: Soft, no guarding or rebound, there is mild left lower quadrant abdominal/left groin tenderness to palpation when patient coughs/exerts abdominal pressure however there is no overt hernia felt (however this may also be due to body habitus)  Extremities: WWP, cap refill <2sec, there is 1+ pitting edema to bilateral " lower legs up to the mid shin.  There is chronic venous stasis dermatitis present.  There are small scratches on bilateral shins that do not appear infected.  Neuro: Alert, symmetrical facies, moves all extremities equally  Psych: Appropriate mood and affect    Assessment/Plan     Problem List Items Addressed This Visit       Paroxysmal atrial fibrillation (Multi)    Overview     Continue following with cardiology-EP. Continue taking metoprolol succinate, Xarelto, and Tikosyn per cards.  Patient also has a loop recorder in.         Dependent edema    Current Assessment & Plan     Patient cannot tolerate Lasix and often cannot tolerate spironolactone due to urinary urgency and frequency.  Recommended again that patient wear compression stockings to the knee regularly.         Mild intermittent asthma without complication (HHS-HCC)    Current Assessment & Plan     Well-controlled with as needed albuterol rescue inhaler         Sleep apnea    Overview     Continue following with sleep medicine         Current Assessment & Plan     Patient is about to get a new home sleep study and new CPAP supplies.         Benign essential hypertension    Current Assessment & Plan     Blood pressure is well-controlled on losartan 25 mg daily         Chronic diastolic heart failure (Multi)    Overview     Follows with cardiology         Left lower quadrant abdominal pain    Current Assessment & Plan     Patient's left lower quadrant abdominal pain only with exertion is concerning for hernia.  However due to body habitus it is difficult to feel if there is a clear hernia.  Will obtain an ultrasound.         Relevant Orders    US abdomen limited    Acute otitis externa of left ear - Primary    Current Assessment & Plan     Ciprodex drops prescribed         Relevant Medications    ciprofloxacin-dexamethasone (CiproDEX) otic suspension     Elsie Ziegler D.O.  Family Medicine Physician  [facility]  [facility]  [address] [place], [place] [id number] [phone]    This note has been transcribed using Dragon voice recognition system and there is a possibility of unintentional typing misprints.

## 2024-09-12 NOTE — ASSESSMENT & PLAN NOTE
Patient's left lower quadrant abdominal pain only with exertion is concerning for hernia.  However due to body habitus it is difficult to feel if there is a clear hernia.  Will obtain an ultrasound.

## 2024-09-16 ENCOUNTER — HOSPITAL ENCOUNTER (OUTPATIENT)
Dept: CARDIOLOGY | Facility: CLINIC | Age: 74
Discharge: HOME | End: 2024-09-16
Payer: MEDICARE

## 2024-09-16 ENCOUNTER — HOSPITAL ENCOUNTER (OUTPATIENT)
Dept: RADIOLOGY | Facility: HOSPITAL | Age: 74
Discharge: HOME | End: 2024-09-16
Payer: MEDICARE

## 2024-09-16 DIAGNOSIS — Z95.818 IMPLANTABLE LOOP RECORDER PRESENT: ICD-10-CM

## 2024-09-16 DIAGNOSIS — I48.0 PAROXYSMAL ATRIAL FIBRILLATION (MULTI): ICD-10-CM

## 2024-09-16 DIAGNOSIS — R10.32 LEFT LOWER QUADRANT ABDOMINAL PAIN: ICD-10-CM

## 2024-09-16 PROCEDURE — 76882 US LMTD JT/FCL EVL NVASC XTR: CPT

## 2024-09-16 PROCEDURE — 76882 US LMTD JT/FCL EVL NVASC XTR: CPT | Performed by: RADIOLOGY

## 2024-09-16 PROCEDURE — 93298 REM INTERROG DEV EVAL SCRMS: CPT

## 2024-09-23 ENCOUNTER — HOSPITAL ENCOUNTER (OUTPATIENT)
Dept: CARDIOLOGY | Facility: CLINIC | Age: 74
Discharge: HOME | End: 2024-09-23
Payer: MEDICARE

## 2024-09-23 DIAGNOSIS — I48.0 PAROXYSMAL ATRIAL FIBRILLATION (MULTI): ICD-10-CM

## 2024-09-23 DIAGNOSIS — Z95.818 IMPLANTABLE LOOP RECORDER PRESENT: ICD-10-CM

## 2024-10-10 ENCOUNTER — CLINICAL SUPPORT (OUTPATIENT)
Dept: SLEEP MEDICINE | Facility: CLINIC | Age: 74
End: 2024-10-10
Payer: MEDICARE

## 2024-10-10 DIAGNOSIS — I48.0 PAROXYSMAL ATRIAL FIBRILLATION (MULTI): ICD-10-CM

## 2024-10-10 DIAGNOSIS — G47.30 SLEEP APNEA, UNSPECIFIED TYPE: ICD-10-CM

## 2024-10-10 NOTE — PROGRESS NOTES
Type of Study: HOME SLEEP STUDY - NOMAD     The patient received equipment and instructions for use of the [facility] Nom[facility] HSAT device. The patient was instructed how to apply the effort belts, cannula, thermistor. It was also explained how the Nomad and oximeter components work.  The patient was asked to record their sleep for an 8-hour period.     The patient was informed of their responsibility for the device and acknowledged this by signing the HSAT device contract. The patient was asked to return the device on 10/11/2024 between the hours of 06:30 am -06:30 pm to the Sleep Center.     The patient was instructed to call 911 as usual for any medical- emergencies while at home.  The patient was also given a phone number for troubleshooting when using the device in case there were additional questions.

## 2024-10-30 ENCOUNTER — TELEPHONE (OUTPATIENT)
Dept: SLEEP MEDICINE | Facility: HOSPITAL | Age: 74
End: 2024-10-30
Payer: MEDICARE

## 2024-10-30 DIAGNOSIS — G47.33 OSA (OBSTRUCTIVE SLEEP APNEA): Primary | ICD-10-CM

## 2024-10-31 ENCOUNTER — LAB (OUTPATIENT)
Dept: LAB | Facility: LAB | Age: 74
End: 2024-10-31
Payer: MEDICARE

## 2024-10-31 ENCOUNTER — APPOINTMENT (OUTPATIENT)
Dept: CARDIOLOGY | Facility: CLINIC | Age: 74
End: 2024-10-31
Payer: MEDICARE

## 2024-10-31 VITALS
HEIGHT: 63 IN | SYSTOLIC BLOOD PRESSURE: 120 MMHG | DIASTOLIC BLOOD PRESSURE: 78 MMHG | HEART RATE: 92 BPM | WEIGHT: 252 LBS | BODY MASS INDEX: 44.65 KG/M2 | OXYGEN SATURATION: 94 %

## 2024-10-31 DIAGNOSIS — I48.0 PAROXYSMAL ATRIAL FIBRILLATION (MULTI): Primary | ICD-10-CM

## 2024-10-31 DIAGNOSIS — Z79.899 VISIT FOR MONITORING TIKOSYN THERAPY: ICD-10-CM

## 2024-10-31 DIAGNOSIS — Z51.81 VISIT FOR MONITORING TIKOSYN THERAPY: ICD-10-CM

## 2024-10-31 DIAGNOSIS — I42.0 DILATED IDIOPATHIC CARDIOMYOPATHY (MULTI): ICD-10-CM

## 2024-10-31 LAB
ANION GAP SERPL CALC-SCNC: 10 MMOL/L (ref 10–20)
BUN SERPL-MCNC: 22 MG/DL (ref 6–23)
CALCIUM SERPL-MCNC: 10.1 MG/DL (ref 8.6–10.3)
CHLORIDE SERPL-SCNC: 102 MMOL/L (ref 98–107)
CO2 SERPL-SCNC: 32 MMOL/L (ref 21–32)
CREAT SERPL-MCNC: 0.87 MG/DL (ref 0.5–1.05)
EGFRCR SERPLBLD CKD-EPI 2021: 70 ML/MIN/1.73M*2
GLUCOSE SERPL-MCNC: 115 MG/DL (ref 74–99)
MAGNESIUM SERPL-MCNC: 1.74 MG/DL (ref 1.6–2.4)
POTASSIUM SERPL-SCNC: 4.3 MMOL/L (ref 3.5–5.3)
SODIUM SERPL-SCNC: 140 MMOL/L (ref 136–145)

## 2024-10-31 PROCEDURE — 1036F TOBACCO NON-USER: CPT | Performed by: STUDENT IN AN ORGANIZED HEALTH CARE EDUCATION/TRAINING PROGRAM

## 2024-10-31 PROCEDURE — 93000 ELECTROCARDIOGRAM COMPLETE: CPT | Performed by: STUDENT IN AN ORGANIZED HEALTH CARE EDUCATION/TRAINING PROGRAM

## 2024-10-31 PROCEDURE — 1159F MED LIST DOCD IN RCRD: CPT | Performed by: STUDENT IN AN ORGANIZED HEALTH CARE EDUCATION/TRAINING PROGRAM

## 2024-10-31 PROCEDURE — 3074F SYST BP LT 130 MM HG: CPT | Performed by: STUDENT IN AN ORGANIZED HEALTH CARE EDUCATION/TRAINING PROGRAM

## 2024-10-31 PROCEDURE — 83735 ASSAY OF MAGNESIUM: CPT

## 2024-10-31 PROCEDURE — 3078F DIAST BP <80 MM HG: CPT | Performed by: STUDENT IN AN ORGANIZED HEALTH CARE EDUCATION/TRAINING PROGRAM

## 2024-10-31 PROCEDURE — 3008F BODY MASS INDEX DOCD: CPT | Performed by: STUDENT IN AN ORGANIZED HEALTH CARE EDUCATION/TRAINING PROGRAM

## 2024-10-31 PROCEDURE — 80048 BASIC METABOLIC PNL TOTAL CA: CPT

## 2024-10-31 PROCEDURE — 99214 OFFICE O/P EST MOD 30 MIN: CPT | Performed by: STUDENT IN AN ORGANIZED HEALTH CARE EDUCATION/TRAINING PROGRAM

## 2024-10-31 PROCEDURE — 36415 COLL VENOUS BLD VENIPUNCTURE: CPT

## 2024-11-04 ENCOUNTER — TELEPHONE (OUTPATIENT)
Dept: PRIMARY CARE | Facility: CLINIC | Age: 74
End: 2024-11-04
Payer: MEDICARE

## 2024-11-04 DIAGNOSIS — Z12.31 ENCOUNTER FOR SCREENING MAMMOGRAM FOR MALIGNANT NEOPLASM OF BREAST: Primary | ICD-10-CM

## 2024-11-07 ENCOUNTER — HOSPITAL ENCOUNTER (OUTPATIENT)
Dept: CARDIOLOGY | Facility: CLINIC | Age: 74
Discharge: HOME | End: 2024-11-07
Payer: MEDICARE

## 2024-11-07 ENCOUNTER — APPOINTMENT (OUTPATIENT)
Dept: CARDIOLOGY | Facility: CLINIC | Age: 74
End: 2024-11-07
Payer: MEDICARE

## 2024-11-07 DIAGNOSIS — I48.0 PAROXYSMAL ATRIAL FIBRILLATION (MULTI): Primary | ICD-10-CM

## 2024-11-07 DIAGNOSIS — I48.0 PAROXYSMAL ATRIAL FIBRILLATION (MULTI): ICD-10-CM

## 2024-11-07 DIAGNOSIS — Z95.818 IMPLANTABLE LOOP RECORDER PRESENT: ICD-10-CM

## 2024-11-07 PROCEDURE — 99211 OFF/OP EST MAY X REQ PHY/QHP: CPT | Performed by: STUDENT IN AN ORGANIZED HEALTH CARE EDUCATION/TRAINING PROGRAM

## 2024-11-07 PROCEDURE — 93000 ELECTROCARDIOGRAM COMPLETE: CPT | Performed by: STUDENT IN AN ORGANIZED HEALTH CARE EDUCATION/TRAINING PROGRAM

## 2024-11-07 NOTE — PROGRESS NOTES
Cardiac Electrophysiology Office Visit     Referred by Dr. Wharton ref. provider found for   No chief complaint on file.    HPI:  Helen Johnston is a 74 y.o. year old female patient with h/o hypertension, diastolic heart failure, diabetes, HLD, JENI (not complaint with CPAP) paroxysmal atrial fibrillation presenting today for follow up    PMHx/PSHx: As above  Tobacco Denies, Alcohol Social, Caffeine use   1 cups of tea / day, Drug use  Denies    Objective  Allergies   Allergen Reactions    Lisinopril Anaphylaxis    Thiopental Shortness of breath    Vancomycin Shortness of breath    Cefuroxime Unknown    Erythromycin Swelling and Rash    Linezolid Itching and Swelling    Procainamide Other      Heart racing with dental procedure    Procaine Unknown    Silver Sulfadiazine Swelling and Other     SKIN BURNING      Current Outpatient Medications   Medication Instructions    albuterol 90 mcg/actuation inhaler 2 puffs, Every 6 hours PRN    atorvastatin (LIPITOR) 20 mg, Daily    bacitracin ophthalmic ointment APPLY 1/4 INCH TO BOTH EYES AT BEDTIME    cetirizine (ZYRTEC) 10 mg capsule Take by mouth.    cholecalciferol (VITAMIN D-3) 50,000 Units, Weekly    hydrocortisone-iodoquinoL (Dermazene) 1-1 % cream in packet Apply topically.    levothyroxine (SYNTHROID, LEVOXYL) 50 mcg, Daily    loteprednol (Lotemax) 0.5 % ophthalmic suspension 1 drop    metoprolol succinate XL (TOPROL-XL) 50 mg, oral, Nightly    neomycin-bacitracin-polymyxin-hydrocortisone (Cortisporin) 3.5-400-10,000 mg-unit/g-1% ophthalmic ointment Apply to affected eye(s).    nystatin (Mycostatin) 100,000 unit/gram powder Apply topically. Apply to affected areas 2-3 times daily    olopatadine (Patanol) 0.1 % ophthalmic solution Every 12 hours    pantoprazole (PROTONIX) 40 mg, Daily before breakfast    peg 400-propylene glycol (Systane, propylene glycoL,) 0.4-0.3 % drops ophthalmic drops 1 each    prednisoLONE acetate (Pred-Forte) 1 % ophthalmic suspension 2 times  daily PRN    rivaroxaban (XARELTO) 20 mg, oral, Daily with evening meal    spironolactone (ALDACTONE) 25 mg, Daily    tobramycin-dexamethasone (Tobradex) ophthalmic ointment Place into both eyes nightly Apply to both eyes nightly.    triamcinolone (Kenalog) 0.1 % ointment Apply topically.    urea (Carmol) 40 % cream Apply topically.         Visit Vitals  Smoking Status Never        Physical Exam     My Interpretation of Reviewed Study(s):  Echo (June 2022): Normal left ventricular systolic function with an EF of 60 to 65%.  Mildly dilated left atrium with a right atrium upper limit of normal.  No pericardial effusion noted.  [id number] Score  Age 65-74: 1   Sex Female: 1   CHF History Yes: 1   HTN Yes: 1   Stroke/TIA/Thromboembolism No: 0   Vascular Dz: CAD/PAD/Aortic Plaque No: 0   DM No: 0   Total Score 4     Assessment/Plan   #Paroxysmal atrial fibrillation s/p CryoPVI (2018)  #Tikosyn Monitoring  AF Dx History: years ago, feels fatigue but not palpitations or fluttering sensation.; h/o Cardioversion: Yes; AAD Use: Dofetilide 500mcg BID (current); Anticoagulation use: Xarelto 20mg Daily (current) Warfarin (stopped due to Change in Rx); h/o Ablation: 2018; [id number] Score: 4.  Overall burden 0.1% on ILR.  c/w AC: Xarelto 20mg Daily  Stopping dofetilide, due to QT prolongation and SSS, if needed may need to consider PPM + Amio if pt has AF or has Sinus bradycardia  ECG shows sinus rhythm with long TN ventricular rate 90 bpm.  Device interrogation today showed multiple episodes of atrial tachycardia and atrial fibrillation    #Junctional Rhythm - resolved    #ILR monitoring  Followed at [place] [facility]  2 episodes of AT in May lasting 2 and 8 mins    #HTN  BP recently has been lower recently. Losartan was recently d/c'd    #Sleep Apnea  Patient reports she has been recently intolerant of her masks but has been having a lot of daytime sleepiness.  Her sleep study was from many years ago.  She was told that  she is not a candidate for the inspire device due to her weight.  It may be worthwhile to repeat a sleep study and have her being evaluated to address her concerns.  Sleep study showed Mod Sleep Apnea - Pending mask fitting    Return to Clinic: Patient should return to the EP Clinic in 2 months      Otto Rojas MD [facility]  Cardiac Electrophysiology  [email or web address]    **Disclaimer: This note was dictated by speech recognition, and every effort has been made to prevent any error in transcription, however minor errors may be present**

## 2024-11-22 ENCOUNTER — TELEPHONE (OUTPATIENT)
Dept: PRIMARY CARE | Facility: CLINIC | Age: 74
End: 2024-11-22
Payer: MEDICARE

## 2024-11-22 DIAGNOSIS — R60.9 DEPENDENT EDEMA: Primary | ICD-10-CM

## 2024-11-22 NOTE — TELEPHONE ENCOUNTER
"Pt left message stating she just found out her insurance covers compression stockings. If possible to get a couple stockings. Pt stated she used to get them from compression sale. Phone number [phone] . Specific name pt uses is \"[employer]\" wound control.  "

## 2024-11-25 ENCOUNTER — HOSPITAL ENCOUNTER (OUTPATIENT)
Dept: CARDIOLOGY | Facility: CLINIC | Age: 74
Discharge: HOME | End: 2024-11-25
Payer: MEDICARE

## 2024-11-25 DIAGNOSIS — Z95.818 IMPLANTABLE LOOP RECORDER PRESENT: ICD-10-CM

## 2024-11-25 DIAGNOSIS — I48.0 PAROXYSMAL ATRIAL FIBRILLATION (MULTI): ICD-10-CM

## 2024-11-25 PROCEDURE — 93298 REM INTERROG DEV EVAL SCRMS: CPT

## 2024-12-01 ENCOUNTER — PATIENT MESSAGE (OUTPATIENT)
Dept: PRIMARY CARE | Facility: CLINIC | Age: 74
End: 2024-12-01
Payer: MEDICARE

## 2024-12-01 DIAGNOSIS — M25.551 PAIN OF RIGHT HIP: Primary | ICD-10-CM

## 2024-12-02 DIAGNOSIS — J45.20 MILD INTERMITTENT ASTHMA WITHOUT COMPLICATION (HHS-HCC): Primary | ICD-10-CM

## 2024-12-02 RX ORDER — ALBUTEROL SULFATE 90 UG/1
2 INHALANT RESPIRATORY (INHALATION) EVERY 4 HOURS PRN
Qty: 18 G | Refills: 11 | Status: SHIPPED | OUTPATIENT
Start: 2024-12-02

## 2024-12-04 ENCOUNTER — HOSPITAL ENCOUNTER (OUTPATIENT)
Dept: RADIOLOGY | Facility: HOSPITAL | Age: 74
Discharge: HOME | End: 2024-12-04
Payer: MEDICARE

## 2024-12-04 DIAGNOSIS — M25.551 PAIN OF RIGHT HIP: ICD-10-CM

## 2024-12-04 PROCEDURE — 73502 X-RAY EXAM HIP UNI 2-3 VIEWS: CPT | Mod: RT

## 2024-12-04 PROCEDURE — 73502 X-RAY EXAM HIP UNI 2-3 VIEWS: CPT | Mod: RIGHT SIDE | Performed by: RADIOLOGY

## 2024-12-16 ENCOUNTER — HOSPITAL ENCOUNTER (OUTPATIENT)
Dept: CARDIOLOGY | Facility: CLINIC | Age: 74
Discharge: HOME | End: 2024-12-16
Payer: MEDICARE

## 2024-12-16 DIAGNOSIS — I48.0 PAROXYSMAL ATRIAL FIBRILLATION (MULTI): ICD-10-CM

## 2024-12-16 DIAGNOSIS — Z95.818 IMPLANTABLE LOOP RECORDER PRESENT: ICD-10-CM

## 2024-12-16 NOTE — PROGRESS NOTES
Patient: Helen Johnston    [id number]  : 1950 -- AGE 74 y.o.    Provider: Melanie Shah MD PhD     Location [facility]   Service Date: 2024            [facility]  Followup Visit Note      HISTORY OF PRESENT ILLNESS     The patient's referring provider is: No ref. provider found    REASON FOR VISIT:   Chief Complaint   Patient presents with    pt fuv        HISTORY OF PRESENT ILLNESS   Ms. Helen Johnston is a 74 y.o. female with past medical history of paroxysmal atrial fibrillation, idiopathic cardiomyopathy/HFpEF, GERD, hypothyroidism, mild intermittent asthma, HLD, HTN who presents to a [facility] for a sleep medicine follow up visit with JENI on CPAP, paroxysmal a-fib with implanted loop recorder, HTN, and COPD.     PAST SLEEP HISTORY  Diagnosed with JENI in , unsure of severity. Previously had a CPAP (managed by Dr. Young), but has not used it in 4 years at least. Thinks her setting was 13-16 cmH2O. Historically had poor CPAP compliance with FFM, unable to keep mask on longer than 3 hours/night. She had a Resmed S9 device.    She had had Afib since  in the setting of a reaction to vancomycin infusion with a need for cardioversion 8 times and had an eventual ablation .  Has an implanted loop recorder, followed by Dr. Rojas who referred her to sleep medicine.  Her A-fib is intermittent with longest duration of 18 min or so per patient report.    She had an HSAT on 10/10/2024 at a BMI of 40.7 with an ESS of 16.  The total monitoring time was 521 minutes.  All of the time was spent sleeping in the nonsupine position.  Her HEATHER 3% was 22 events/hour the HEATHER 4% was 14 events/hour.  The oxygen saturation demonstrated an SpO2 lizbet 71% and a T88 was 283 minutes.  Sustained hypoxemia was noted during periods of nonscoreable airflow limitation.    CURRENT HISTORY  At time of her initial visit a new sleep  study was recommended which showed that she had moderate JENI and APAP was started.    On today's visit, patient believes she is making progress compared to previous attempts at CPAP use.  She is able to keep it on for at least 4 hours when she remembers to put it on before falling asleep.  She does receive some benefit from wearing CPAP and is able to tolerate nasal pillow.  She still has some daytime sleepiness around 2 PM but instead of napping she will lay down and rest or watch TV. She continues to struggle with sleep schedule and daytime routine, but will continue to work on it.    Recently, she has had some COPD exacerbation, has not been to pulmonology in years and is not on controller medication.  Albuterol is helpful but recently she feels short of breath and has dyspnea on exertion.  Previous pulmonologist is with [facility].  No recent lung function testing.     Has not received current flu, COVID, or RSV vaccination for this year    Sleep schedule:  In bed: 10 PM  Activities in bed:   Bedtime Activities: use a computer/tablet/smartphone and TV  Subjective sleep latency:  10 min - 2 hours (if TV on)  Awakenings during night: 1-2x/night  Length of awakenings: 10 min to 1 hr (sometimes will go play on computer)  Final awakening time: 7 AM  Out of bed: 7-9 AM  Overall estimate of total sleep time: 9 hrs, sleeps well if she takes her nighttime medications    Weekends: same  Preferred sleeping position: sidelying, elevated head of bed  Typically sleeps alone.     Daytime Symptoms:  On awakening patient reports:  Less sleep inertia when she wears CPAP.    Daytime: During the day, she does doze unintentionally while inactive.  During more active tasks, she sometimes is drowsy.  With regards to daytime napping, the patient reports sometimes taking naps. If she takes a nap, typically she awakens refreshed.  She does not report that poor sleep results in mood-related irritability. She does not report that  poor sleep results in issues with memory/concentration.    Driving History: She is retired, but drives still.  She is not a . With driving, the patient admits to sleepy driving, with 0 sleepiness-related motor vehicle accidents and 0 near misses.  She will take a quick nap at a rest stop, open windows, turn on AC, or talk on the phone.    ESS: 10  SALVADOR: 11  FOSQ:  30      REVIEW OF SYSTEMS     REVIEW OF SYSTEMS  Review of Systems  As above      ALLERGIES AND MEDICATIONS     ALLERGIES  Allergies   Allergen Reactions    Lisinopril Anaphylaxis    Thiopental Shortness of breath    Vancomycin Shortness of breath    Cefuroxime Unknown    Erythromycin Swelling and Rash    Linezolid Itching and Swelling    Procainamide Other      Heart racing with dental procedure    Procaine Unknown    Silver Sulfadiazine Swelling and Other     SKIN BURNING       MEDICATIONS  Current Outpatient Medications   Medication Sig Dispense Refill    albuterol 90 mcg/actuation inhaler Inhale 2 puffs every 4 hours if needed for wheezing or shortness of breath (cough). 18 g 11    atorvastatin (Lipitor) 20 mg tablet Take 1 tablet (20 mg) by mouth once daily.      bacitracin ophthalmic ointment APPLY 1/4 INCH TO BOTH EYES AT BEDTIME      cetirizine (ZYRTEC) 10 mg capsule Take by mouth.      cholecalciferol (Vitamin D-3) 50,000 unit capsule Take 1 capsule (50,000 Units) by mouth once a week.      hydrocortisone-iodoquinoL (Dermazene) 1-1 % cream in packet Apply topically.      levothyroxine (Synthroid, Levoxyl) 50 mcg tablet Take 1 tablet (50 mcg) by mouth once daily.      loteprednol (Lotemax) 0.5 % ophthalmic suspension 1 drop.      metoprolol succinate XL (Toprol-XL) 50 mg 24 hr tablet Take 1 tablet (50 mg) by mouth once daily at bedtime. 90 tablet 3    neomycin-bacitracin-polymyxin-hydrocortisone (Cortisporin) 3.5-400-10,000 mg-unit/g-1% ophthalmic ointment Apply to affected eye(s).      nystatin (Mycostatin) 100,000 unit/gram powder  Apply topically. Apply to affected areas 2-3 times daily      olopatadine (Patanol) 0.1 % ophthalmic solution Administer into affected eye(s) every 12 hours.      pantoprazole (ProtoNix) 40 mg EC tablet Take 1 tablet (40 mg) by mouth once daily in the morning. Take before meals.      peg 400-propylene glycol (Systane, propylene glycoL,) 0.4-0.3 % drops ophthalmic drops 1 each.      prednisoLONE acetate (Pred-Forte) 1 % ophthalmic suspension Administer into affected eye(s) 2 times a day as needed.      rivaroxaban (Xarelto) 20 mg tablet Take 1 tablet (20 mg) by mouth once daily in the evening. Take with meals. 90 tablet 3    spironolactone (Aldactone) 25 mg tablet Take 1 tablet (25 mg) by mouth once daily.      tobramycin-dexamethasone (Tobradex) ophthalmic ointment Place into both eyes nightly Apply to both eyes nightly.      triamcinolone (Kenalog) 0.1 % ointment Apply topically.      urea (Carmol) 40 % cream Apply topically.       No current facility-administered medications for this visit.     PAST HISTORY     PAST MEDICAL HISTORY  She  has a past medical history of Diabetes mellitus (Multi).     PAST SURGICAL HISTORY:  No past surgical history on file.    FAMILY HISTORY  No family history on file.  She does not have a family history of sleep disorder (e.g., sleep apnea, narcolepsy in any first degree relatives).      SOCIAL HISTORY  She  reports that she has never smoked. She has never used smokeless tobacco. She reports that she does not drink alcohol and does not use drugs. She currently lives alone.     Work history: The patient currently has been retired, but previously worked as  in [place].     Caffeine consumption: Yes - tea or 1 coke daily  Alcohol consumption: Yes - only 1x/year  Smoking: No  Marijuana: No      PHYSICAL EXAM     Physical Examination: /69   Pulse 101   Temp 36.7 °C (98 °F) (Temporal)   Wt 116 kg (256 lb)   SpO2 90%   BMI 45.35 kg/m²     *Patient  "reports home oxygen saturation spot checks ranged from 93 to 95% on room air.  With her resting in clinic and not talking, pulse oximetry around 88%.  After 6 deep breaths oxygen saturation increased to 93% on room air.    PREVIOUS WEIGHTS:  Wt Readings from Last 3 Encounters:   12/17/24 116 kg (256 lb)   10/31/24 114 kg (252 lb)   09/12/24 114 kg (252 lb 6.4 oz)       General: The patient is a pleasant female, in no acute distress. HEENT: She has a  a modified Mallampati grade  3-4  airway with Numbers; 0-4 (with +): No tonsils bilaterally. The soft palate was low hanging and the uvula was thickened. The A/P diameter of the velopharynx was not narrowed. The oropharynx was not shallow in the A/P diameter. Lateral wall narrowing was mildly present. Tongue ridging was present.  Retrognathia  and micrognathia  present. Neck: The neck was enlarged. No JVP, bruits or lymphadenopathy was appreciated. Chest: Clear to auscultation. No wheezes, rales, or rhonchi.  Shortness of breath present and mild difficulty speaking in sentences.  Cardiovascular: Regular rate and rhythm. No murmurs, gallops, or clicks. Abdomen: Soft, nontender, nondistended. Positive bowel sounds. Extremities: No clubbing, cyanosis, or edema is noted. Neurologic exam: Alert, oriented x3 and was grossly non-focal.    RESULTS/DATA     No results found for: \"IRON\", \"TRANSFERRIN\", \"IRONSAT\", \"TIBC\", \"FERRITIN\"    Bicarbonate (mmol/L)   Date Value   10/31/2024 32   06/11/2024 30   12/28/2023 34 (H)     PAP ADHERENCE REPORT        DIAGNOSES     Problem List and Orders  Diagnoses and all orders for this visit:  Shortness of breath  -     Referral to Pulmonology; Future  -     Complete Pulmonary Function Test (Spirometry/DLCO/Lung Volumes); Future  -     albuterol 2.5 mg /3 mL (0.083 %) nebulizer solution 3 mL  -     albuterol 90 mcg/actuation inhaler 1 puff  -     Oximetry Desat/O2 Titration (Exercise Desat); Future  -     Pulse oximetry, overnight; Future  JENI " (obstructive sleep apnea)  -     Follow Up In Adult Sleep Medicine; Future  -     Pulse oximetry, overnight; Future  Paroxysmal atrial fibrillation (Multi)  Obesity, Class III, BMI 40-49.9 (morbid obesity) (Multi)      ASSESSMENT/PLAN     Ms. Johnston is a 74 y.o. female and with past medical history of paroxysmal atrial fibrillation, JENI, morbid obesity, idiopathic cardiomyopathy/HFpEF, GERD, hypothyroidism, mild intermittent asthma, HLD, HTN. She presents to  the [facility] to address JENI.  Historically, she was diagnosed with JENI of unknown severity in 2006 and was using CPAP; however, she struggled with compliance due to discomfort.  Since restarting CPAP with nasal pillow, patient is making some progression with PAP therapy, but needs to work on consistency and days of use.  Shortness of breath, dyspnea on exertion and intermittent hypoxia on room air today is concerning for patient's COPD status.  Referral to pulmonology, pulmonary function testing, exercise desaturation testing, and nocturnal pulse oximetry while on CPAP and room air ordered today.  Patient expressed understanding and was agreeable with plan.    #Obstructive Sleep apnea  - History of JENI and difficulty with CPAP compliance  - Continue APAP 4-12 cmw with NP  -Encouraged consistent use nightly and during naps.    #COPD  #SOB  #Hypoxia  #SOARES  -Concerning for change in patient's COPD status and lung function given physical exam and presentation today  - Referral for pulmonology placed  - Pulmonary function testing, exercise saturation testing, and nocturnal pulse oximetry while on CPAP and room air via [facility] ordered today  - Highly encourage patient to keep albuterol inhaler with her at all times  - Discussed and reviewed deep breathing exercises  -Highly encouraged patient to obtain this year's flu, COVID, and RSV vaccination    #Paroxysmal atrial fibrillation  - Discussed with patient the relationship between atrial  fibrillation and JENI  - Follow up with cardiology    #Obesity  - Discussed the role of weight management  in JENI  - Encourage patient to promote healthy lifestyle habits    Follow up: 3 months    Patient was staffed with Dr. Pablo Lopez MD    Sleep Medicine Fellow  Division of Pulmonology and Sleep Medicine  [facility]         ----------------------------------------------------------------------------------------------------------------------  12/19/2024  10:53 AM ::     Written by Melanie Shah MD, PhD    Briefly, Ms. Johnston, a 74 y.o. female, was evaluated at the [facility] for sleep apnea.     I personally saw and evaluated the patient. I discussed the patient with the Fellow and agree with their note which accurately reflects my own findings and plan. In summary, patient with hx of JENI but only using CPAP intermittently in part due to falling asleep before putting on. . Patient with past history of chart dx of COPD but we have no PFTs. Has noted some SOB and oxygen sat noted to be 88-90% at rest. Plan for PFTs and oxygen desaturation test. Alos will refer to pulmonary for chronic care followup.      Please refer to the full clinic note for specific details.    Melanie Shah MD, PhD  Division of Pulmonary, Critical Care, and Sleep Medicine  Clinical Associate Professor of Medicine, [facility]  , Sleep Medicine  System Director,  Sleep Medicine    ----------------------------------------------------------------------------------------------------------------------

## 2024-12-17 ENCOUNTER — OFFICE VISIT (OUTPATIENT)
Dept: SLEEP MEDICINE | Facility: HOSPITAL | Age: 74
End: 2024-12-17
Payer: MEDICARE

## 2024-12-17 VITALS
HEART RATE: 101 BPM | WEIGHT: 256 LBS | DIASTOLIC BLOOD PRESSURE: 69 MMHG | OXYGEN SATURATION: 90 % | BODY MASS INDEX: 45.35 KG/M2 | SYSTOLIC BLOOD PRESSURE: 108 MMHG | TEMPERATURE: 98 F

## 2024-12-17 DIAGNOSIS — R06.02 SHORTNESS OF BREATH: Primary | ICD-10-CM

## 2024-12-17 DIAGNOSIS — G47.33 OSA (OBSTRUCTIVE SLEEP APNEA): ICD-10-CM

## 2024-12-17 DIAGNOSIS — E66.01 OBESITY, CLASS III, BMI 40-49.9 (MORBID OBESITY) (MULTI): ICD-10-CM

## 2024-12-17 DIAGNOSIS — I48.0 PAROXYSMAL ATRIAL FIBRILLATION (MULTI): ICD-10-CM

## 2024-12-17 PROCEDURE — 3078F DIAST BP <80 MM HG: CPT | Performed by: INTERNAL MEDICINE

## 2024-12-17 PROCEDURE — 1126F AMNT PAIN NOTED NONE PRSNT: CPT | Performed by: INTERNAL MEDICINE

## 2024-12-17 PROCEDURE — G2211 COMPLEX E/M VISIT ADD ON: HCPCS | Performed by: INTERNAL MEDICINE

## 2024-12-17 PROCEDURE — 1159F MED LIST DOCD IN RCRD: CPT | Performed by: INTERNAL MEDICINE

## 2024-12-17 PROCEDURE — 99214 OFFICE O/P EST MOD 30 MIN: CPT | Performed by: INTERNAL MEDICINE

## 2024-12-17 PROCEDURE — 1036F TOBACCO NON-USER: CPT | Performed by: INTERNAL MEDICINE

## 2024-12-17 PROCEDURE — 99214 OFFICE O/P EST MOD 30 MIN: CPT | Mod: GC | Performed by: INTERNAL MEDICINE

## 2024-12-17 PROCEDURE — 3074F SYST BP LT 130 MM HG: CPT | Performed by: INTERNAL MEDICINE

## 2024-12-17 RX ORDER — ALBUTEROL SULFATE 0.83 MG/ML
3 SOLUTION RESPIRATORY (INHALATION) ONCE
OUTPATIENT
Start: 2024-12-17 | End: 2024-12-17

## 2024-12-17 RX ORDER — ALBUTEROL SULFATE 90 UG/1
1 INHALANT RESPIRATORY (INHALATION) ONCE
OUTPATIENT
Start: 2024-12-17

## 2024-12-17 ASSESSMENT — PAIN SCALES - GENERAL: PAINLEVEL_OUTOF10: 0-NO PAIN

## 2024-12-17 NOTE — PATIENT INSTRUCTIONS
[facility] Sleep Medicine  [facility]  [address]  [facility] [id number] [phone]       NAME: Helen Johnston   DATE: 12/17/24    Your Sleep Provider Today: Melanie Shah MD PhD  Your Primary Care Physician: Elsie Ziegler, DO   Your Referring Provider: No ref. provider found    DIAGNOSIS:   1. Shortness of breath  Referral to Pulmonology    Complete Pulmonary Function Test (Spirometry/DLCO/Lung Volumes)    albuterol 2.5 mg /3 mL (0.083 %) nebulizer solution 3 mL    albuterol 90 mcg/actuation inhaler 1 puff    Oximetry Desat/O2 Titration (Exercise Desat)    Pulse oximetry, overnight    CANCELED: Pulse oximetry, overnight      2. JENI (obstructive sleep apnea)  Follow Up In Adult Sleep Medicine    Pulse oximetry, overnight    CANCELED: Pulse oximetry, overnight      3. Paroxysmal atrial fibrillation (Multi)        4. Obesity, Class III, BMI 40-49.9 (morbid obesity) (Multi)            Thank you for coming to the Sleep Medicine Clinic today! Your sleep medicine provider today was: Melanie Shah MD PhD Below is a summary of your treatment plan, other important information, and our contact numbers:      TREATMENT PLAN     Please get your flu, COVID, and RSV vaccinations at any pharmacy.    [facility] will contact you for an overnight oxygen test with you on CPAP.    Make sure you have your albuterol with you at all times.    Instructions - Common JENI Recs: - For your sleep apnea, continue to use your PAP every night and use it whenever you are sleeping.   - Avoid alcohol or sedatives several hours prior to sleeping.   - Get additional supplies for your PAP (e.g., mask, hose, filters) every 3 months or as your insurance allows from your [employer] company. Replacement cushions for your PAP mask can be requested monthly if airseals are an issue.  - Remember to clean your mask, tubings, and water chamber regularly as instructed.  - Avoid driving or operating  heavy machinery when drowsy. A person driving while sleepy is five (5) times more likely to have an accident. If you feel sleepy, pull over and take a short power nap (sleep for less than 30 minutes). Otherwise, ask somebody to drive you.    Referrals:  We are referring you to: pulmonology for COPD. Please call [phone] ([phone]) to schedule.    Please get lung testing completed in the meantime.    Follow-up Appointment:   3 months      IMPORTANT INFORMATION     Call 911 for medical emergencies.  Our offices are generally open from Monday-Friday, 9 am - 5 pm.  If you need to get in touch with me, you may either call me and my team(number is below) or you can use Via.  If a referral for a test, for CPAP, or for another specialist was made, and you have not heard about scheduling this within a week, please call scheduling at [phone]).  If you are unable to make your appointment for clinic or an overnight study, kindly call the office at least 48 hours in advance to cancel and reschedule.  If you are on CPAP, please bring your device's card or the device to each clinic appointment.   There are no supporting services by either the sleep doctors or their staff on weekends and Holidays, or after 5 PM on weekdays.   If you have been asked to come to a sleep study, make sure you bring toiletries, a comfy pillow, and any nighttime medications that you may regularly take. Also be sure to eat dinner before you arrive. We generally do not provide meals.      PRESCRIPTIONS     We require 7 days advanced notice for prescription refills. If we do not receive the request in this time, we cannot guarantee that your medication will be refilled in time.      IMPORTANT PHONE NUMBERS     Sleep Medicine Clinic Fax: [phone]  Appointments (for Adult Sleep Clinic): [phone] - option 2  Appointments (For Sleep Studies): [phone] - option 3  Behavioral Sleep Medicine: [phone]  Sleep  "Surgery: [phone]  ENT (Otolaryngology): [phone]  Headache Clinic (Neurology): [phone]  Neurology: [phone]  Psychiatry: [phone]  Pulmonary Function Testing (PFT) Center: [phone]  Pulmonary Medicine: [phone]  [employer] (DME): [phone]  [employer] (DME): [phone]  [facility] (DME): [phone]      OUR ADULT SLEEP MEDICINE TEAM   Please do not hesitate to call the office or sleep nurse with any questions between appointments:    Adult Sleep Nurses (Mehnaz Howe, RN and Radha Tolliver RN):  For clinical questions and refilling prescriptions: [phone]  Email sleep diaries and other documents at: [email or web address]    Adult Sleep Medicine Secretaries:  Beverley Lackey (For Sanchez/Larkin/Krise/Strohl/Yeh/Cotton):   P: [phone]  F: [phone]  Gloria Davis (For Shah/Guggenbiller): P: [phone]  Fax: [phone]  Maribel Henderson (For Jurcevic/Blank): P: [phone]  F: [phone]  Marielos Dixon (For [place]): P: [phone]  F: [phone]  Valerie Kent (For Joan/Jemma/Zakhary): P: [phone]  F: [phone]  Ginny Lewis (For Kwong/Barriga): P: [phone]  F: [phone]       CONTACTING YOUR SLEEP MEDICINE PROVIDER     Send a message directly to your provider through \"My Chart\", which is the email service through your  Records Account: [email or web address]   Call [phone] and leave a message. One of the administrative assistants will forward the message to your sleep medicine provider through \"My Chart\" and/or email.     Your sleep medicine provider for this visit was: Melanie Shah MD PhD    "

## 2024-12-19 ENCOUNTER — OFFICE VISIT (OUTPATIENT)
Dept: PRIMARY CARE | Facility: CLINIC | Age: 74
End: 2024-12-19
Payer: MEDICARE

## 2024-12-19 VITALS
TEMPERATURE: 98.8 F | DIASTOLIC BLOOD PRESSURE: 66 MMHG | BODY MASS INDEX: 45.82 KG/M2 | RESPIRATION RATE: 22 BRPM | OXYGEN SATURATION: 87 % | HEIGHT: 63 IN | SYSTOLIC BLOOD PRESSURE: 124 MMHG | HEART RATE: 107 BPM | WEIGHT: 258.6 LBS

## 2024-12-19 DIAGNOSIS — J45.20 MILD INTERMITTENT ASTHMA WITHOUT COMPLICATION (HHS-HCC): ICD-10-CM

## 2024-12-19 DIAGNOSIS — I42.0 DILATED IDIOPATHIC CARDIOMYOPATHY (MULTI): ICD-10-CM

## 2024-12-19 DIAGNOSIS — G47.33 OSA (OBSTRUCTIVE SLEEP APNEA): ICD-10-CM

## 2024-12-19 DIAGNOSIS — J44.89 COPD WITH ASTHMA (MULTI): ICD-10-CM

## 2024-12-19 DIAGNOSIS — J44.1 COPD EXACERBATION (MULTI): Primary | ICD-10-CM

## 2024-12-19 DIAGNOSIS — J40 BRONCHITIS: ICD-10-CM

## 2024-12-19 DIAGNOSIS — H60.332 ACUTE SWIMMER'S EAR OF LEFT SIDE: ICD-10-CM

## 2024-12-19 PROBLEM — R10.32 LEFT LOWER QUADRANT ABDOMINAL PAIN: Status: RESOLVED | Noted: 2024-09-12 | Resolved: 2024-12-19

## 2024-12-19 PROBLEM — R35.0 URINARY FREQUENCY: Status: RESOLVED | Noted: 2024-05-13 | Resolved: 2024-12-19

## 2024-12-19 PROBLEM — H60.502 ACUTE OTITIS EXTERNA OF LEFT EAR: Status: RESOLVED | Noted: 2024-09-12 | Resolved: 2024-12-19

## 2024-12-19 PROCEDURE — 3008F BODY MASS INDEX DOCD: CPT

## 2024-12-19 PROCEDURE — 3074F SYST BP LT 130 MM HG: CPT

## 2024-12-19 PROCEDURE — 99214 OFFICE O/P EST MOD 30 MIN: CPT

## 2024-12-19 PROCEDURE — 1036F TOBACCO NON-USER: CPT

## 2024-12-19 PROCEDURE — G2211 COMPLEX E/M VISIT ADD ON: HCPCS

## 2024-12-19 PROCEDURE — 3078F DIAST BP <80 MM HG: CPT

## 2024-12-19 PROCEDURE — 1159F MED LIST DOCD IN RCRD: CPT

## 2024-12-19 RX ORDER — BUDESONIDE, GLYCOPYRROLATE, AND FORMOTEROL FUMARATE 160; 9; 4.8 UG/1; UG/1; UG/1
2 AEROSOL, METERED RESPIRATORY (INHALATION)
COMMUNITY

## 2024-12-19 RX ORDER — GUAIFENESIN 1200 MG/1
1200 TABLET, EXTENDED RELEASE ORAL EVERY 12 HOURS PRN
Qty: 14 TABLET | Refills: 0 | Status: SHIPPED | OUTPATIENT
Start: 2024-12-19 | End: 2024-12-26

## 2024-12-19 RX ORDER — IPRATROPIUM BROMIDE AND ALBUTEROL SULFATE 2.5; .5 MG/3ML; MG/3ML
3 SOLUTION RESPIRATORY (INHALATION) EVERY 4 HOURS PRN
Qty: 360 ML | Refills: 3 | Status: SHIPPED | OUTPATIENT
Start: 2024-12-19

## 2024-12-19 RX ORDER — PREDNISONE 50 MG/1
50 TABLET ORAL DAILY
Qty: 5 TABLET | Refills: 0 | Status: SHIPPED | OUTPATIENT
Start: 2024-12-19 | End: 2024-12-24

## 2024-12-19 RX ORDER — DOXYCYCLINE 100 MG/1
100 CAPSULE ORAL 2 TIMES DAILY
Qty: 20 CAPSULE | Refills: 0 | Status: SHIPPED | OUTPATIENT
Start: 2024-12-19 | End: 2024-12-29

## 2024-12-19 RX ORDER — IPRATROPIUM BROMIDE 42 UG/1
2 SPRAY, METERED NASAL 4 TIMES DAILY PRN
Qty: 15 ML | Refills: 0 | Status: SHIPPED | OUTPATIENT
Start: 2024-12-19 | End: 2024-12-23

## 2024-12-19 RX ORDER — ALBUTEROL SULFATE 90 UG/1
2 INHALANT RESPIRATORY (INHALATION) EVERY 4 HOURS PRN
Qty: 18 G | Refills: 11 | Status: SHIPPED | OUTPATIENT
Start: 2024-12-19

## 2024-12-19 RX ORDER — SPIRONOLACTONE 25 MG/1
25 TABLET ORAL DAILY
Qty: 90 TABLET | Refills: 3 | Status: SHIPPED | OUTPATIENT
Start: 2024-12-19

## 2024-12-19 NOTE — ASSESSMENT & PLAN NOTE
Initially patient had a little bit of increased work of breathing and some mild conversational dyspnea.  She had poor air exchange on exam.  She was saturating at 87% on room air.  After the albuterol nebulizer treatment in the office her work of breathing had gone back to normal and she had no more conversational dyspnea.  She was then saturating at 89% on room air.  She had much better air exchange and no wheezing.  She stated that she felt much better.  Suspect that patient has a viral versus bacterial bronchitis that is causing an exacerbation of her COPD/asthma.  I gave her a sample in the office today for a Breztri 3 and 1 inhaler.  She is not currently using anything as maintenance for her COPD/asthma.  I am giving her a 5-day prednisone burst of 50 mg each day.  I am giving her doxycycline antibiotic for bronchitis versus pneumonia.  She cannot take macrolides.  I am giving her Mucinex as needed.  I am giving her Atrovent nasal spray to help with rhinorrhea and postnasal drip.  I am refilling her DuoNeb solution for her nebulizer at home as well as her albuterol rescue inhaler.  Recommended she take Zyrtec every day while she is ill.  Strict ED precautions given.  Follow-up if not improving over the next week.

## 2024-12-19 NOTE — PROGRESS NOTES
"Subjective   Helen Johnston is a 74 y.o. female  Patient has a history of COPD with asthma.  Recently she has had a dry cough, wheezing, shortness of breath, chest congestion.  No chest pain.  She has felt a little bit hot but no true fevers or chills.  A little bit of sore throat with some postnasal drip.  No abdominal pain, nausea, vomiting, diarrhea.  She took a COVID test at home and it was negative.  She does not take an every day maintenance inhaler for her COPD with asthma.  She has tried using her rescue albuterol inhaler at home and it is not helping very much.  She recently had a sleep study and her oxygen went down to the low 80s, however then it got better with CPAP.  She also currently has an otitis externa infection for which she is on eardrops.    Objective   /66   Pulse 107   Temp 37.1 °C (98.8 °F)   Resp 22   Ht 1.6 m (5' 3\")   Wt 117 kg (258 lb 9.6 oz)   SpO2 (!) 87%   BMI 45.81 kg/m²    PHYSICAL EXAM  Gen: Well appearing, in NAD  Eyes: EOMI  HEENT: MMM. TMs normal.  Left otitis externa present.  Throat normal.  Heart: RRR, no murmurs  Lungs: Initially patient had a little bit of increased work of breathing and some mild conversational dyspnea.  She had poor air exchange on exam.  She was saturating at 87% on room air.  After the albuterol nebulizer treatment in the office her work of breathing had gone back to normal and she had no more conversational dyspnea.  She was then saturating at 89% on room air.  She had much better air exchange and no wheezing.  Extremities: WWP, cap refill <2sec, no pitting edema in LE b/l  Neuro: Alert, symmetrical facies, moves all extremities equally  Psych: Appropriate mood and affect    Assessment/Plan     Problem List Items Addressed This Visit       Dilated idiopathic cardiomyopathy (Multi)    Relevant Medications    spironolactone (Aldactone) 25 mg tablet    JENI (obstructive sleep apnea)    Overview     Continue following with sleep medicine         " Acute swimmer's ear of left side    Current Assessment & Plan     Continue using eardrops.         COPD with asthma (Multi)    Bronchitis    Relevant Medications    doxycycline (Vibramycin) 100 mg capsule    ipratropium (Atrovent) 42 mcg (0.06 %) nasal spray    ipratropium-albuteroL (Duo-Neb) 0.5-2.5 mg/3 mL nebulizer solution    COPD exacerbation (Multi) - Primary    Current Assessment & Plan     Initially patient had a little bit of increased work of breathing and some mild conversational dyspnea.  She had poor air exchange on exam.  She was saturating at 87% on room air.  After the albuterol nebulizer treatment in the office her work of breathing had gone back to normal and she had no more conversational dyspnea.  She was then saturating at 89% on room air.  She had much better air exchange and no wheezing.  She stated that she felt much better.  Suspect that patient has a viral versus bacterial bronchitis that is causing an exacerbation of her COPD/asthma.  I gave her a sample in the office today for a Breztri 3 and 1 inhaler.  She is not currently using anything as maintenance for her COPD/asthma.  I am giving her a 5-day prednisone burst of 50 mg each day.  I am giving her doxycycline antibiotic for bronchitis versus pneumonia.  She cannot take macrolides.  I am giving her Mucinex as needed.  I am giving her Atrovent nasal spray to help with rhinorrhea and postnasal drip.  I am refilling her DuoNeb solution for her nebulizer at home as well as her albuterol rescue inhaler.  Recommended she take Zyrtec every day while she is ill.  Strict ED precautions given.  Follow-up if not improving over the next week.         Relevant Medications    guaiFENesin (Mucinex) 1,200 mg tablet extended release 12hr    predniSONE (Deltasone) 50 mg tablet     Other Visit Diagnoses       Mild intermittent asthma without complication ([facility])        Relevant Medications    albuterol 90 mcg/actuation inhaler          Elsie SCHMIDT  NICOLLE Ziegler.  Family Medicine Physician  [facility]  [address] [place] [place] [address] [phone]    This note has been transcribed using Dragon voice recognition system and there is a possibility of unintentional typing misprints.

## 2024-12-19 NOTE — PATIENT INSTRUCTIONS
Breztri inhaler 2 puffs twice daily EVERY DAY regardless of how you feel  Duonebs in the nebulizer or albuterol inhaler as needed  Take zyrtec every day while sick  Doxycycline with food  Prednisone in the morning

## 2024-12-20 DIAGNOSIS — J40 BRONCHITIS: ICD-10-CM

## 2024-12-26 ENCOUNTER — HOSPITAL ENCOUNTER (OUTPATIENT)
Dept: RADIOLOGY | Facility: CLINIC | Age: 74
Discharge: HOME | End: 2024-12-26
Payer: MEDICARE

## 2024-12-26 ENCOUNTER — HOSPITAL ENCOUNTER (OUTPATIENT)
Dept: CARDIOLOGY | Facility: CLINIC | Age: 74
Discharge: HOME | End: 2024-12-26
Payer: MEDICARE

## 2024-12-26 VITALS — BODY MASS INDEX: 45.7 KG/M2 | WEIGHT: 257.94 LBS | HEIGHT: 63 IN

## 2024-12-26 DIAGNOSIS — I48.0 PAROXYSMAL ATRIAL FIBRILLATION (MULTI): ICD-10-CM

## 2024-12-26 DIAGNOSIS — Z95.818 IMPLANTABLE LOOP RECORDER PRESENT: ICD-10-CM

## 2024-12-26 DIAGNOSIS — Z12.31 ENCOUNTER FOR SCREENING MAMMOGRAM FOR MALIGNANT NEOPLASM OF BREAST: ICD-10-CM

## 2024-12-26 LAB — BODY SURFACE AREA: 2.28 M2

## 2024-12-26 PROCEDURE — 77067 SCR MAMMO BI INCL CAD: CPT

## 2024-12-26 PROCEDURE — 93298 REM INTERROG DEV EVAL SCRMS: CPT

## 2024-12-26 RX ORDER — IPRATROPIUM BROMIDE 42 UG/1
SPRAY, METERED NASAL
Qty: 15 ML | Refills: 0 | OUTPATIENT
Start: 2024-12-26

## 2024-12-31 ENCOUNTER — HOSPITAL ENCOUNTER (OUTPATIENT)
Dept: RADIOLOGY | Facility: EXTERNAL LOCATION | Age: 74
Discharge: HOME | End: 2024-12-31

## 2025-01-02 ENCOUNTER — TELEPHONE (OUTPATIENT)
Dept: CARDIOLOGY | Facility: CLINIC | Age: 75
End: 2025-01-02
Payer: MEDICARE

## 2025-01-02 NOTE — TELEPHONE ENCOUNTER
Aleksandra from [facility] called, Xarelto is on  back order, it is unknown when it will be back in stock. They are asking for alternative.     Called pt and left a vm.

## 2025-01-03 NOTE — TELEPHONE ENCOUNTER
Called pt. She will call local pharmacies to see if they have xarelto in stock. She will call the office once she finds out where xarelto is available.

## 2025-01-06 ENCOUNTER — HOSPITAL ENCOUNTER (OUTPATIENT)
Dept: CARDIOLOGY | Facility: CLINIC | Age: 75
Discharge: HOME | End: 2025-01-06
Payer: MEDICARE

## 2025-01-06 DIAGNOSIS — I48.0 PAROXYSMAL ATRIAL FIBRILLATION (MULTI): ICD-10-CM

## 2025-01-06 DIAGNOSIS — Z95.818 IMPLANTABLE LOOP RECORDER PRESENT: ICD-10-CM

## 2025-01-06 PROBLEM — E66.813 CLASS 3 SEVERE OBESITY WITH BODY MASS INDEX (BMI) OF 45.0 TO 49.9 IN ADULT: Status: ACTIVE | Noted: 2019-03-19

## 2025-01-08 ENCOUNTER — OFFICE VISIT (OUTPATIENT)
Dept: WOUND CARE | Facility: CLINIC | Age: 75
End: 2025-01-08
Payer: MEDICARE

## 2025-01-08 ENCOUNTER — HOSPITAL ENCOUNTER (OUTPATIENT)
Dept: CARDIOLOGY | Facility: CLINIC | Age: 75
Discharge: HOME | End: 2025-01-08
Payer: MEDICARE

## 2025-01-08 DIAGNOSIS — Z95.818 IMPLANTABLE LOOP RECORDER PRESENT: ICD-10-CM

## 2025-01-08 DIAGNOSIS — I48.0 PAROXYSMAL ATRIAL FIBRILLATION (MULTI): ICD-10-CM

## 2025-01-08 PROCEDURE — 99213 OFFICE O/P EST LOW 20 MIN: CPT | Mod: 25

## 2025-01-08 PROCEDURE — 11042 DBRDMT SUBQ TIS 1ST 20SQCM/<: CPT

## 2025-01-13 ENCOUNTER — HOSPITAL ENCOUNTER (OUTPATIENT)
Dept: CARDIOLOGY | Facility: CLINIC | Age: 75
Discharge: HOME | End: 2025-01-13
Payer: MEDICARE

## 2025-01-13 DIAGNOSIS — I48.0 PAROXYSMAL ATRIAL FIBRILLATION (MULTI): ICD-10-CM

## 2025-01-13 DIAGNOSIS — Z95.818 IMPLANTABLE LOOP RECORDER PRESENT: ICD-10-CM

## 2025-01-13 PROCEDURE — 93298 REM INTERROG DEV EVAL SCRMS: CPT

## 2025-01-15 ENCOUNTER — OFFICE VISIT (OUTPATIENT)
Dept: WOUND CARE | Facility: CLINIC | Age: 75
End: 2025-01-15
Payer: MEDICARE

## 2025-01-15 PROCEDURE — 99212 OFFICE O/P EST SF 10 MIN: CPT

## 2025-01-15 NOTE — PROGRESS NOTES
Patient: Helen Johnston    [id number]  : 1950 -- AGE 74 y.o.    Provider: Kimmy SALVADOR CNP     Location [facility]   Service Date: 1/15/2025              [facility]  New Visit Note    Virtual or Telephone Consent  {Complete for a virtual or telephone visit:14066}  {Select who provided consent:76118}    HISTORY OF PRESENT ILLNESS     The patient's referring provider is: Melanie Shah MD PhD    HISTORY OF PRESENT ILLNESS   Helen Johnston is a 74 y.o. female with a history of *** who is a never/current/ former smoker (***pack years), who presents to a [facility] for a/an *** initial/follow up evaluation for No chief complaint on file..     I have independently interviewed and examined the patient in the office and reviewed available records.    Current History    On today's visit, the patient reports ***    HPI:  OLDCART  cough  wheeze  Fevers/chills  SOARES  SOB at rest  Chest tightness  allergies  GERD  ED visits  Up to date on vaccines  Night time  He is only troubled by breathlessness except on strenuous exercise (mMRC 0).  They are s short of breath when hurrying on level ground or walking up a slight hill (mMRC 1).  They are  have  stop for breath when walking at her own pace on level ground at about 15 minutes (mMRC 2).  CAT  ACT    Previous pulmonary history: He has no history of recurrent infections, or lung disease as a child.  He had no previous lung hx, never on oxygen or inhaler therapy.   He was previously told he has . He currently is on no supplemental oxygen.  He has never been to pulmonary rehab. Does not recall having AECOPD requiring antibiotics or prednisone.    Inhalers/nebulized medications:     Hospitalization History: He has not been hospitalized over the last year for breathing related problem.    Sleep history:  Denies snoring, apnea, feeling tired during the day or taking naps during the day.    Complains of snoring, apnea, feeling tired during the day, and taking naps during the day.       ALLERGIES AND MEDICATIONS     ALLERGIES  Allergies   Allergen Reactions    Lisinopril Anaphylaxis    Thiopental Shortness of breath    Vancomycin Shortness of breath    Cefuroxime Unknown    Erythromycin Swelling and Rash    Linezolid Itching and Swelling    Procainamide Other      Heart racing with dental procedure    Procaine Unknown    Silver Sulfadiazine Swelling and Other     SKIN BURNING       MEDICATIONS  Current Outpatient Medications   Medication Sig Dispense Refill    albuterol 90 mcg/actuation inhaler Inhale 2 puffs every 4 hours if needed for wheezing or shortness of breath (cough). 18 g 11    atorvastatin (Lipitor) 20 mg tablet Take 1 tablet (20 mg) by mouth once daily.      bacitracin ophthalmic ointment APPLY 1/4 INCH TO BOTH EYES AT BEDTIME      budesonide-glycopyr-formoterol (Breztri Aerosphere) 160-9-4.8 mcg/actuation HFA aerosol inhaler Inhale 2 puffs 2 times a day.      cetirizine (ZYRTEC) 10 mg capsule Take by mouth.      cholecalciferol (Vitamin D-3) 50,000 unit capsule Take 1 capsule (50,000 Units) by mouth once a week.      hydrocortisone-iodoquinoL (Dermazene) 1-1 % cream in packet Apply topically.      ipratropium (Atrovent) 42 mcg (0.06 %) nasal spray Administer 2 sprays into each nostril 4 times a day as needed for rhinitis for up to 4 days. 15 mL 0    ipratropium-albuteroL (Duo-Neb) 0.5-2.5 mg/3 mL nebulizer solution Take 3 mL by nebulization every 4 hours if needed for wheezing or shortness of breath. 360 mL 3    levothyroxine (Synthroid, Levoxyl) 50 mcg tablet Take 1 tablet (50 mcg) by mouth once daily.      loteprednol (Lotemax) 0.5 % ophthalmic suspension 1 drop.      metoprolol succinate XL (Toprol-XL) 50 mg 24 hr tablet Take 1 tablet (50 mg) by mouth once daily at bedtime. 90 tablet 3    neomycin-bacitracin-polymyxin-hydrocortisone (Cortisporin) 3.5-400-10,000 mg-unit/g-1% ophthalmic  ointment Apply to affected eye(s).      nystatin (Mycostatin) 100,000 unit/gram powder Apply topically. Apply to affected areas 2-3 times daily      olopatadine (Patanol) 0.1 % ophthalmic solution Administer into affected eye(s) every 12 hours.      pantoprazole (ProtoNix) 40 mg EC tablet Take 1 tablet (40 mg) by mouth once daily in the morning. Take before meals.      peg 400-propylene glycol (Systane, propylene glycoL,) 0.4-0.3 % drops ophthalmic drops 1 each.      prednisoLONE acetate (Pred-Forte) 1 % ophthalmic suspension Administer into affected eye(s) 2 times a day as needed.      rivaroxaban (Xarelto) 20 mg tablet Take 1 tablet (20 mg) by mouth once daily in the evening. Take with meals. 90 tablet 3    spironolactone (Aldactone) 25 mg tablet Take 1 tablet (25 mg) by mouth once daily. 90 tablet 3    tobramycin-dexamethasone (Tobradex) ophthalmic ointment Place into both eyes nightly Apply to both eyes nightly.      triamcinolone (Kenalog) 0.1 % ointment Apply topically.      urea (Carmol) 40 % cream Apply topically.       No current facility-administered medications for this visit.         PAST HISTORY     PAST MEDICAL HISTORY      PAST SURGICAL HISTORY  No past surgical history on file.    IMMUNIZATION HISTORY  Immunization History   Administered Date(s) Administered    Flu vaccine, quadrivalent, high-dose, preservative free, age 65y+ (FLUZONE) 10/19/2020, 01/12/2022, 11/17/2022, 11/28/2023    Flu vaccine, trivalent, preservative free, HIGH-DOSE, age 65y+ (Fluzone) 01/23/2018, 01/09/2020    Influenza, Unspecified 11/26/2008, 11/10/2011    Influenza, injectable, quadrivalent 11/19/2015    Influenza, seasonal, injectable 11/19/2013, 10/23/2014, 10/07/2015    Moderna COVID-19 vaccine, 12 years and older (50mcg/0.5mL)(Spikevax) 10/16/2023    Moderna COVID-19 vaccine, bivalent, blue cap/gray label *Check age/dose* 01/12/2023    Moderna SARS-CoV-2 Vaccination 03/03/2021, 04/07/2021, 12/06/2021, 04/22/2022    Td  vaccine, age 7 years and older (TDVAX) 02/08/2010    Tdap vaccine, age 7 year and older (BOOSTRIX, ADACEL) 11/09/2015, 10/26/2022       SOCIAL HISTORY  Tobacco Smoking:   Smokeless Tobacco/Vaping:   Illicit drugs:  Alcohol consumption:   Pets:     OCCUPATIONAL/ENVIRONMENTAL HISTORY  Occupation: ***  *** known exposure to asbestos, silica, beryllium or inhaled metals.  *** exposure to birds or exotic animals.    FAMILY HISTORY  No family history on file.  *** family history of pulmonary disease.  *** family history of cancer.  *** family history of autoimmune disorders.    REVIEW OF SYSTEMS     REVIEW OF SYSTEMS  Review of Systems    Constitutional: No fever, no chills, no night sweats.    Eyes: No double vision, no floaters, no dry eyes.   ENT: See HPI.   Neck: No neck stiffness.  Cardiovascular: No sharp chest pain, no heart racing, no leg swelling.  Respiratory: as noted in HPI.   Gastrointestinal: No nausea, no vomiting, no diarrhea.   Musculoskeletal: No joint pain, no back pain.   Integumentary: No rashes or sores.  Neurological: No dizziness, no headaches. Sleeping well.  Psychiatric: No mood changes.   Endocrine: No hot flashes, no cold intolerance, weight is stable.  Hematologic: No easy bruising or bleeding.    PHYSICAL EXAM     VITAL SIGNS: There were no vitals taken for this visit.     CURRENT WEIGHT: There is no height or weight on file to calculate BMI.  PREVIOUS WEIGHTS:  Wt Readings from Last 3 Encounters:   12/26/24 117 kg (257 lb 15 oz)   12/19/24 117 kg (258 lb 9.6 oz)   12/17/24 116 kg (256 lb)       Physical Exam    Constitutional: General appearance: Alert and oriented.  No acute distress. Well developed, well nourished.  Head and face: Symmetric  ENT: external inspection of ear and nose normal. No intranasal polyps. No oropharyngeal exudates.    Oropharynx: normal   Neck: supple, no lymphadenopathy  Pulmonary: Chest is normal. No increased work of breathing or signs of respiratory distress.  Clear to auscultation bilaterally - no crackles, wheezing, or rhonchi.   Cardiovascular: Heart rate and rhythm normal. Normal S1, S2 - no murmurs, gallops, or pericardial rub.   Abdomen: Soft, non tender, +BS  Extremities: No edema. No clubbing or cyanosis of the fingernails.    Neurologic: Moves all four extremities   MSK: Normal movements of extremities. Gait normal   Psychiatric: Intact judgement and insight.    RESULTS/DATA     Pulmonary Function Test Results     No results found    Chest Radiograph     XR chest 1 view 08/27/2023    Narrative  EXAMINATION:  ONE XRAY VIEW OF THE CHEST    8/27/2023 12:11 pm    COMPARISON:  None.    HISTORY:  ORDERING SYSTEM PROVIDED HISTORY: CP  TECHNOLOGIST PROVIDED HISTORY:  Reason for exam:->CP  Reason for Exam: cp    FINDINGS:  Medical devices: Loop recorder or other medical device overlying the left  hemithorax.    Mediastinum/Heart: The mediastinal contours are normal. The heart appears  normal in size.    Lungs: Mild opacification of the left retrocardiac and left lung base.  Right  basilar atelectasis.    Pleura: No pleural effusion. No pneumothorax.    Impression  Bibasilar opacification, left side greater than right, may represent  atelectasis or pneumonia.      Chest CT Scan     No results found     Echocardiogram     No results found        Labwork     Lab Results   Component Value Date    WBC 7.9 06/11/2024    HGB 12.8 06/11/2024    HCT 42.6 06/11/2024    MCV 94 06/11/2024     06/11/2024      Lab Results   Component Value Date    GLUCOSE 115 (H) 10/31/2024    CALCIUM 10.1 10/31/2024     10/31/2024    K 4.3 10/31/2024    CO2 32 10/31/2024     10/31/2024    BUN 22 10/31/2024    CREATININE 0.87 10/31/2024      Lab Results   Component Value Date    ALT 10 06/11/2024    AST 21 06/11/2024    ALKPHOS 66 06/11/2024    BILITOT 0.7 06/11/2024        Protime   Date/Time Value Ref Range Status   12/19/2023 02:29 PM 17.0 (H) 9.8 - 12.8 seconds Final     INR  "  Date/Time Value Ref Range Status   12/19/2023 02:29 PM 1.5 (H) 0.9 - 1.1 Final       No results found for: \"ICIGE\", \"IGE\", \"ICA04\", \"ASPFU\", \"IGG\", \"IGA\", \"IGM\"    Peripheral Eosinophil Count/Percentage:   Eosinophils Absolute (x10*3/uL)   Date Value   06/11/2024 0.03     Eosinophils % (%)   Date Value   06/11/2024 0.4       ASSESSMENT/PLAN   Ms. Johnston is a 74 y.o. female,     Problem List and Orders  {Assess/PlanSmartLinks:61786}    Assessment and Plan / Recommendations:  Problem List Items Addressed This Visit    None                  Follow up in *** or sooner if needed.    If you have any questions please call the office [phone]    Thank you for visiting the Pulmonary clinic today!   Kimmy Mcwilliams CNP  [phone]   "

## 2025-01-20 ENCOUNTER — HOSPITAL ENCOUNTER (OUTPATIENT)
Dept: RADIOLOGY | Facility: HOSPITAL | Age: 75
Discharge: HOME | End: 2025-01-20
Payer: MEDICARE

## 2025-01-20 DIAGNOSIS — D41.01 NEOPLASM OF UNCERTAIN BEHAVIOR OF RIGHT KIDNEY: ICD-10-CM

## 2025-01-20 PROCEDURE — 76770 US EXAM ABDO BACK WALL COMP: CPT | Performed by: RADIOLOGY

## 2025-01-20 PROCEDURE — 76770 US EXAM ABDO BACK WALL COMP: CPT

## 2025-01-22 ENCOUNTER — HOSPITAL ENCOUNTER (OUTPATIENT)
Dept: CARDIOLOGY | Facility: CLINIC | Age: 75
Discharge: HOME | End: 2025-01-22
Payer: MEDICARE

## 2025-01-22 DIAGNOSIS — Z95.818 IMPLANTABLE LOOP RECORDER PRESENT: ICD-10-CM

## 2025-01-22 DIAGNOSIS — I48.0 PAROXYSMAL ATRIAL FIBRILLATION (MULTI): ICD-10-CM

## 2025-01-23 ENCOUNTER — APPOINTMENT (OUTPATIENT)
Dept: CARDIOLOGY | Facility: CLINIC | Age: 75
End: 2025-01-23
Payer: MEDICARE

## 2025-01-27 ENCOUNTER — HOSPITAL ENCOUNTER (OUTPATIENT)
Dept: CARDIOLOGY | Facility: CLINIC | Age: 75
Discharge: HOME | End: 2025-01-27
Payer: MEDICARE

## 2025-01-27 DIAGNOSIS — Z95.818 IMPLANTABLE LOOP RECORDER PRESENT: ICD-10-CM

## 2025-01-27 DIAGNOSIS — I48.0 PAROXYSMAL ATRIAL FIBRILLATION (MULTI): ICD-10-CM

## 2025-01-29 ENCOUNTER — HOSPITAL ENCOUNTER (OUTPATIENT)
Dept: RESPIRATORY THERAPY | Facility: HOSPITAL | Age: 75
Discharge: HOME | End: 2025-01-29
Payer: MEDICARE

## 2025-01-29 ENCOUNTER — HOSPITAL ENCOUNTER (OUTPATIENT)
Dept: CARDIOLOGY | Facility: CLINIC | Age: 75
Discharge: HOME | End: 2025-01-29
Payer: MEDICARE

## 2025-01-29 ENCOUNTER — APPOINTMENT (OUTPATIENT)
Facility: CLINIC | Age: 75
End: 2025-01-29
Payer: MEDICARE

## 2025-01-29 DIAGNOSIS — R06.02 SHORTNESS OF BREATH: ICD-10-CM

## 2025-01-29 DIAGNOSIS — I48.0 PAROXYSMAL ATRIAL FIBRILLATION (MULTI): ICD-10-CM

## 2025-01-29 DIAGNOSIS — Z95.818 IMPLANTABLE LOOP RECORDER PRESENT: ICD-10-CM

## 2025-01-29 LAB
MGC ASCENT PFT - FEV1 - POST: 1.06
MGC ASCENT PFT - FEV1 - PRE: 0.81
MGC ASCENT PFT - FEV1 - PREDICTED: 1.96
MGC ASCENT PFT - FVC - POST: 1.84
MGC ASCENT PFT - FVC - PRE: 1.57
MGC ASCENT PFT - FVC - PREDICTED: 2.52

## 2025-01-29 PROCEDURE — 2500000002 HC RX 250 W HCPCS SELF ADMINISTERED DRUGS (ALT 637 FOR MEDICARE OP, ALT 636 FOR OP/ED): Performed by: PEDIATRICS

## 2025-01-29 PROCEDURE — 94060 EVALUATION OF WHEEZING: CPT

## 2025-01-29 PROCEDURE — 94640 AIRWAY INHALATION TREATMENT: CPT

## 2025-01-29 PROCEDURE — 94618 PULMONARY STRESS TESTING: CPT

## 2025-01-29 RX ORDER — ALBUTEROL SULFATE 0.83 MG/ML
3 SOLUTION RESPIRATORY (INHALATION) ONCE
Status: COMPLETED | OUTPATIENT
Start: 2025-01-29 | End: 2025-01-29

## 2025-01-29 RX ORDER — ALBUTEROL SULFATE 90 UG/1
1 INHALANT RESPIRATORY (INHALATION) ONCE
Status: COMPLETED | OUTPATIENT
Start: 2025-01-29 | End: 2025-01-29

## 2025-01-29 RX ADMIN — ALBUTEROL SULFATE 3 ML: 2.5 SOLUTION RESPIRATORY (INHALATION) at 13:29

## 2025-01-30 ENCOUNTER — HOSPITAL ENCOUNTER (OUTPATIENT)
Dept: CARDIOLOGY | Facility: CLINIC | Age: 75
Discharge: HOME | End: 2025-01-30
Payer: MEDICARE

## 2025-01-30 DIAGNOSIS — I48.0 PAROXYSMAL ATRIAL FIBRILLATION (MULTI): ICD-10-CM

## 2025-01-30 DIAGNOSIS — Z95.818 IMPLANTABLE LOOP RECORDER PRESENT: ICD-10-CM

## 2025-01-31 ENCOUNTER — OFFICE VISIT (OUTPATIENT)
Dept: CARDIOLOGY | Facility: CLINIC | Age: 75
End: 2025-01-31
Payer: MEDICARE

## 2025-01-31 VITALS
OXYGEN SATURATION: 92 % | DIASTOLIC BLOOD PRESSURE: 82 MMHG | RESPIRATION RATE: 16 BRPM | WEIGHT: 262 LBS | HEART RATE: 96 BPM | BODY MASS INDEX: 46.42 KG/M2 | SYSTOLIC BLOOD PRESSURE: 154 MMHG

## 2025-01-31 DIAGNOSIS — I10 BENIGN ESSENTIAL HYPERTENSION: ICD-10-CM

## 2025-01-31 DIAGNOSIS — I48.0 PAROXYSMAL ATRIAL FIBRILLATION (MULTI): Primary | ICD-10-CM

## 2025-01-31 DIAGNOSIS — I42.0 DILATED IDIOPATHIC CARDIOMYOPATHY (MULTI): ICD-10-CM

## 2025-01-31 DIAGNOSIS — I48.91 ATRIAL FIBRILLATION, UNSPECIFIED TYPE (MULTI): ICD-10-CM

## 2025-01-31 PROCEDURE — G2211 COMPLEX E/M VISIT ADD ON: HCPCS | Performed by: STUDENT IN AN ORGANIZED HEALTH CARE EDUCATION/TRAINING PROGRAM

## 2025-01-31 PROCEDURE — 99214 OFFICE O/P EST MOD 30 MIN: CPT | Performed by: STUDENT IN AN ORGANIZED HEALTH CARE EDUCATION/TRAINING PROGRAM

## 2025-01-31 PROCEDURE — 1159F MED LIST DOCD IN RCRD: CPT | Performed by: STUDENT IN AN ORGANIZED HEALTH CARE EDUCATION/TRAINING PROGRAM

## 2025-01-31 PROCEDURE — 3079F DIAST BP 80-89 MM HG: CPT | Performed by: STUDENT IN AN ORGANIZED HEALTH CARE EDUCATION/TRAINING PROGRAM

## 2025-01-31 PROCEDURE — 3077F SYST BP >= 140 MM HG: CPT | Performed by: STUDENT IN AN ORGANIZED HEALTH CARE EDUCATION/TRAINING PROGRAM

## 2025-01-31 PROCEDURE — 93005 ELECTROCARDIOGRAM TRACING: CPT | Performed by: STUDENT IN AN ORGANIZED HEALTH CARE EDUCATION/TRAINING PROGRAM

## 2025-01-31 NOTE — PROGRESS NOTES
Cardiac Electrophysiology Office Visit     Referred by Otto Avendano MD for   Chief Complaint   Patient presents with    Follow-up    Atrial Fibrillation     HPI:  Helen Johnston is a 74 y.o. year old female patient with h/o hypertension, diastolic heart failure, diabetes, HLD, JENI (not complaint with CPAP) paroxysmal atrial fibrillation presenting today for follow up    PMHx/PSHx: As above  Tobacco Denies, Alcohol Social, Caffeine use   1 cups of tea / day, Drug use  Denies    Objective  Allergies   Allergen Reactions    Lisinopril Anaphylaxis    Thiopental Shortness of breath    Vancomycin Shortness of breath    Cefuroxime Unknown    Erythromycin Swelling and Rash    Linezolid Itching and Swelling    Procainamide Other      Heart racing with dental procedure    Procaine Unknown    Silver Sulfadiazine Swelling and Other     SKIN BURNING      Current Outpatient Medications   Medication Instructions    albuterol 90 mcg/actuation inhaler 2 puffs, inhalation, Every 4 hours PRN    atorvastatin (LIPITOR) 20 mg, Daily    bacitracin ophthalmic ointment APPLY 1/4 INCH TO BOTH EYES AT BEDTIME    budesonide-glycopyr-formoterol (Breztri Aerosphere) 160-9-4.8 mcg/actuation HFA aerosol inhaler 2 puffs, 2 times daily RT    cetirizine (ZYRTEC) 10 mg capsule Take by mouth.    cholecalciferol (VITAMIN D-3) 50,000 Units, Weekly    hydrocortisone-iodoquinoL (Dermazene) 1-1 % cream in packet Apply topically.    ipratropium (Atrovent) 42 mcg (0.06 %) nasal spray 2 sprays, Each Nostril, 4 times daily PRN    ipratropium-albuteroL (Duo-Neb) 0.5-2.5 mg/3 mL nebulizer solution 3 mL, nebulization, Every 4 hours PRN    levothyroxine (SYNTHROID, LEVOXYL) 50 mcg, Daily    loteprednol (Lotemax) 0.5 % ophthalmic suspension 1 drop    metoprolol succinate XL (TOPROL-XL) 50 mg, oral, Nightly    neomycin-bacitracin-polymyxin-hydrocortisone (Cortisporin) 3.5-400-10,000 mg-unit/g-1% ophthalmic ointment Apply to affected eye(s).    nystatin  (Mycostatin) 100,000 unit/gram powder Apply topically. Apply to affected areas 2-3 times daily    olopatadine (Patanol) 0.1 % ophthalmic solution Every 12 hours    pantoprazole (PROTONIX) 40 mg, Daily before breakfast    peg 400-propylene glycol (Systane, propylene glycoL,) 0.4-0.3 % drops ophthalmic drops 1 each    prednisoLONE acetate (Pred-Forte) 1 % ophthalmic suspension 2 times daily PRN    rivaroxaban (XARELTO) 20 mg, oral, Daily with evening meal    spironolactone (ALDACTONE) 25 mg, oral, Daily    tobramycin-dexamethasone (Tobradex) ophthalmic ointment Place into both eyes nightly Apply to both eyes nightly.    triamcinolone (Kenalog) 0.1 % ointment Apply topically.    urea (Carmol) 40 % cream Apply topically.         Visit Vitals  /82   Pulse 96   Resp 16   Wt 119 kg (262 lb)   SpO2 92%   BMI 46.42 kg/m²   OB Status Postmenopausal   Smoking Status Never   BSA 2.3 m²      Physical Exam  Vitals reviewed.   Constitutional:       Appearance: Normal appearance. She is obese.   HENT:      Head: Normocephalic.   Cardiovascular:      Rate and Rhythm: Normal rate and regular rhythm.   Pulmonary:      Effort: Pulmonary effort is normal. No respiratory distress.      Breath sounds: No wheezing.   Skin:     General: Skin is warm and dry.      Capillary Refill: Capillary refill takes less than 2 seconds.   Neurological:      Mental Status: She is alert.   Psychiatric:         Mood and Affect: Mood normal.      My Interpretation of Reviewed Study(s):  Echo (June 2022): Normal left ventricular systolic function with an EF of 60 to 65%.  Mildly dilated left atrium with a right atrium upper limit of normal.  No pericardial effusion noted.  [id number] Score  Age 65-74: 1   Sex Female: 1   CHF History Yes: 1   HTN Yes: 1   Stroke/TIA/Thromboembolism No: 0   Vascular Dz: CAD/PAD/Aortic Plaque No: 0   DM No: 0   Total Score 4     Assessment/Plan   #Paroxysmal atrial fibrillation s/p CryoPVI (2018)  AF Dx History: years  "ago, feels fatigue but not palpitations or fluttering sensation.; h/o Cardioversion: Yes; AAD Use: Dofetilide 500mcg BID (stopped due to QT prolongation and SSS); Anticoagulation use: Xarelto 20mg Daily (current) Warfarin (stopped due to Change in Rx); h/o Ablation: 2018; [id number] Score: 4. Overall burden 5.8% on ILR.  c/w AC: Xarelto 20mg Daily  ECG shows sinus rhythm with long DC ventricular rate 90 bpm  - with increase AF burden will need to consider Amiodarone +/- PPM for long term rhythm control    #Junctional Rhythm - Resolved  Pt reports some \"off balance: ILR does not any episodes of significant bradycardia or pauses. The off balance is during ambulation.   Counseled pt on monitoring for dizziness, syncope or near syncope    #ILR monitoring    ILR shows overall burden of AT/AF based on frequency has been increasing.    #HTN  BP recently has been lower recently. Losartan was recently d/c'd    Return to Clinic: Patient should return to the EP Clinic in 3 months    Otto Rojas MD [facility]  Cardiac Electrophysiology  [email or web address]    **Disclaimer: This note was dictated by speech recognition, and every effort has been made to prevent any error in transcription, however minor errors may be present**     "

## 2025-02-02 LAB
Q ONSET: 223 MS
QRS COUNT: 16 BEATS
QRS DURATION: 84 MS
QT INTERVAL: 334 MS
QTC CALCULATION(BAZETT): 421 MS
QTC FREDERICIA: 390 MS
R AXIS: -12 DEGREES
T AXIS: 80 DEGREES
T OFFSET: 390 MS
VENTRICULAR RATE: 96 BPM

## 2025-02-04 ENCOUNTER — HOSPITAL ENCOUNTER (OUTPATIENT)
Dept: CARDIOLOGY | Facility: CLINIC | Age: 75
Discharge: HOME | End: 2025-02-04
Payer: MEDICARE

## 2025-02-04 DIAGNOSIS — Z95.818 IMPLANTABLE LOOP RECORDER PRESENT: ICD-10-CM

## 2025-02-04 DIAGNOSIS — I48.0 PAROXYSMAL ATRIAL FIBRILLATION (MULTI): ICD-10-CM

## 2025-02-05 ENCOUNTER — TELEPHONE (OUTPATIENT)
Dept: SLEEP MEDICINE | Facility: HOSPITAL | Age: 75
End: 2025-02-05
Payer: MEDICARE

## 2025-02-05 ENCOUNTER — PATIENT MESSAGE (OUTPATIENT)
Dept: SLEEP MEDICINE | Facility: HOSPITAL | Age: 75
End: 2025-02-05
Payer: MEDICARE

## 2025-02-06 ENCOUNTER — APPOINTMENT (OUTPATIENT)
Facility: CLINIC | Age: 75
End: 2025-02-06
Payer: MEDICARE

## 2025-02-06 VITALS
TEMPERATURE: 97.7 F | DIASTOLIC BLOOD PRESSURE: 74 MMHG | HEIGHT: 63 IN | WEIGHT: 261 LBS | SYSTOLIC BLOOD PRESSURE: 115 MMHG | BODY MASS INDEX: 46.25 KG/M2 | RESPIRATION RATE: 16 BRPM | OXYGEN SATURATION: 91 % | HEART RATE: 94 BPM

## 2025-02-06 DIAGNOSIS — J44.89 COPD WITH ASTHMA (MULTI): Primary | ICD-10-CM

## 2025-02-06 DIAGNOSIS — R06.02 SHORTNESS OF BREATH: ICD-10-CM

## 2025-02-06 PROCEDURE — 1159F MED LIST DOCD IN RCRD: CPT

## 2025-02-06 PROCEDURE — 3008F BODY MASS INDEX DOCD: CPT

## 2025-02-06 PROCEDURE — 3078F DIAST BP <80 MM HG: CPT

## 2025-02-06 PROCEDURE — 3074F SYST BP LT 130 MM HG: CPT

## 2025-02-06 PROCEDURE — 99214 OFFICE O/P EST MOD 30 MIN: CPT

## 2025-02-06 ASSESSMENT — COLUMBIA-SUICIDE SEVERITY RATING SCALE - C-SSRS
2. HAVE YOU ACTUALLY HAD ANY THOUGHTS OF KILLING YOURSELF?: NO
6. HAVE YOU EVER DONE ANYTHING, STARTED TO DO ANYTHING, OR PREPARED TO DO ANYTHING TO END YOUR LIFE?: NO
1. IN THE PAST MONTH, HAVE YOU WISHED YOU WERE DEAD OR WISHED YOU COULD GO TO SLEEP AND NOT WAKE UP?: NO

## 2025-02-06 ASSESSMENT — PATIENT HEALTH QUESTIONNAIRE - PHQ9
SUM OF ALL RESPONSES TO PHQ9 QUESTIONS 1 AND 2: 1
1. LITTLE INTEREST OR PLEASURE IN DOING THINGS: SEVERAL DAYS
10. IF YOU CHECKED OFF ANY PROBLEMS, HOW DIFFICULT HAVE THESE PROBLEMS MADE IT FOR YOU TO DO YOUR WORK, TAKE CARE OF THINGS AT HOME, OR GET ALONG WITH OTHER PEOPLE: SOMEWHAT DIFFICULT
2. FEELING DOWN, DEPRESSED OR HOPELESS: NOT AT ALL

## 2025-02-06 ASSESSMENT — ENCOUNTER SYMPTOMS
LOSS OF SENSATION IN FEET: 1
OCCASIONAL FEELINGS OF UNSTEADINESS: 1
DEPRESSION: 0

## 2025-02-07 NOTE — PATIENT COMMUNICATION
Pt does not want to order night time oxygen at this time (working on insurance/healthcare hurdles currently) but will call back when she's ready

## 2025-02-18 ENCOUNTER — HOSPITAL ENCOUNTER (OUTPATIENT)
Dept: CARDIOLOGY | Facility: CLINIC | Age: 75
Discharge: HOME | End: 2025-02-18
Payer: MEDICARE

## 2025-02-18 DIAGNOSIS — Z95.818 IMPLANTABLE LOOP RECORDER PRESENT: ICD-10-CM

## 2025-02-18 DIAGNOSIS — I48.0 PAROXYSMAL ATRIAL FIBRILLATION (MULTI): ICD-10-CM

## 2025-02-18 PROCEDURE — 93298 REM INTERROG DEV EVAL SCRMS: CPT

## 2025-02-20 ENCOUNTER — OFFICE VISIT (OUTPATIENT)
Dept: WOUND CARE | Facility: CLINIC | Age: 75
End: 2025-02-20
Payer: MEDICARE

## 2025-02-20 ENCOUNTER — PREP FOR PROCEDURE (OUTPATIENT)
Dept: SURGERY | Facility: HOSPITAL | Age: 75
End: 2025-02-20

## 2025-02-20 PROCEDURE — 29581 APPL MULTLAYER CMPRN SYS LEG: CPT | Mod: LT

## 2025-02-20 PROCEDURE — 11042 DBRDMT SUBQ TIS 1ST 20SQCM/<: CPT | Performed by: PLASTIC SURGERY

## 2025-02-20 PROCEDURE — 99213 OFFICE O/P EST LOW 20 MIN: CPT

## 2025-02-20 PROCEDURE — 11042 DBRDMT SUBQ TIS 1ST 20SQCM/<: CPT

## 2025-02-20 PROCEDURE — 99213 OFFICE O/P EST LOW 20 MIN: CPT | Performed by: PLASTIC SURGERY

## 2025-02-27 ENCOUNTER — OFFICE VISIT (OUTPATIENT)
Dept: WOUND CARE | Facility: CLINIC | Age: 75
End: 2025-02-27
Payer: MEDICARE

## 2025-02-27 PROCEDURE — 99212 OFFICE O/P EST SF 10 MIN: CPT | Performed by: PLASTIC SURGERY

## 2025-02-27 PROCEDURE — 99212 OFFICE O/P EST SF 10 MIN: CPT

## 2025-03-10 ENCOUNTER — HOSPITAL ENCOUNTER (OUTPATIENT)
Dept: CARDIOLOGY | Facility: CLINIC | Age: 75
Discharge: HOME | End: 2025-03-10
Payer: MEDICARE

## 2025-03-10 ENCOUNTER — APPOINTMENT (OUTPATIENT)
Dept: PRIMARY CARE | Facility: CLINIC | Age: 75
End: 2025-03-10
Payer: MEDICARE

## 2025-03-10 VITALS
HEIGHT: 62 IN | SYSTOLIC BLOOD PRESSURE: 130 MMHG | DIASTOLIC BLOOD PRESSURE: 70 MMHG | TEMPERATURE: 96 F | OXYGEN SATURATION: 94 % | RESPIRATION RATE: 19 BRPM | WEIGHT: 260.6 LBS | BODY MASS INDEX: 47.96 KG/M2 | HEART RATE: 89 BPM

## 2025-03-10 DIAGNOSIS — I48.0 PAROXYSMAL ATRIAL FIBRILLATION (MULTI): ICD-10-CM

## 2025-03-10 DIAGNOSIS — R60.9 DEPENDENT EDEMA: ICD-10-CM

## 2025-03-10 DIAGNOSIS — Z95.818 IMPLANTABLE LOOP RECORDER PRESENT: ICD-10-CM

## 2025-03-10 DIAGNOSIS — I50.32 CHRONIC DIASTOLIC (CONGESTIVE) HEART FAILURE: ICD-10-CM

## 2025-03-10 DIAGNOSIS — S81.802S WOUND OF LEFT LOWER EXTREMITY, SEQUELA: Primary | ICD-10-CM

## 2025-03-10 PROBLEM — J40 BRONCHITIS: Status: RESOLVED | Noted: 2024-12-19 | Resolved: 2025-03-10

## 2025-03-10 PROBLEM — E66.813 CLASS 3 SEVERE OBESITY WITH BODY MASS INDEX (BMI) OF 45.0 TO 49.9 IN ADULT: Status: RESOLVED | Noted: 2019-03-19 | Resolved: 2025-03-10

## 2025-03-10 PROBLEM — E66.01 CLASS 3 SEVERE OBESITY WITH BODY MASS INDEX (BMI) OF 45.0 TO 49.9 IN ADULT: Status: RESOLVED | Noted: 2019-03-19 | Resolved: 2025-03-10

## 2025-03-10 PROBLEM — S81.802A WOUND OF LEFT LEG: Status: ACTIVE | Noted: 2025-03-10

## 2025-03-10 PROBLEM — H60.332 ACUTE SWIMMER'S EAR OF LEFT SIDE: Status: RESOLVED | Noted: 2024-09-12 | Resolved: 2025-03-10

## 2025-03-10 PROBLEM — J44.1 COPD EXACERBATION (MULTI): Status: RESOLVED | Noted: 2024-12-19 | Resolved: 2025-03-10

## 2025-03-10 LAB — BODY SURFACE AREA: 2.27 M2

## 2025-03-10 PROCEDURE — 3078F DIAST BP <80 MM HG: CPT

## 2025-03-10 PROCEDURE — 1159F MED LIST DOCD IN RCRD: CPT

## 2025-03-10 PROCEDURE — G2211 COMPLEX E/M VISIT ADD ON: HCPCS

## 2025-03-10 PROCEDURE — 1036F TOBACCO NON-USER: CPT

## 2025-03-10 PROCEDURE — 3075F SYST BP GE 130 - 139MM HG: CPT

## 2025-03-10 PROCEDURE — 3008F BODY MASS INDEX DOCD: CPT

## 2025-03-10 PROCEDURE — 99214 OFFICE O/P EST MOD 30 MIN: CPT

## 2025-03-10 RX ORDER — LOTILANER OPHTHALMIC SOLUTION 2.5 MG/ML
SOLUTION/ DROPS OPHTHALMIC
COMMUNITY
Start: 2025-02-21

## 2025-03-10 NOTE — PROGRESS NOTES
"Subjective   Helen Johnston is a 74 y.o. female   1.5 weeks ago pt's puppy accidentally wounded the patient's L lower leg from teeth (not a full bite). It has since scabbed over. No bleeding or drainage. Went to the wound clinic twice and she was told it was not infected. No fever, chills, nausea, vomiting. No worsening leg swelling than normal. Has been trying to wear compression socks.     Objective   /70   Pulse 89   Temp 35.6 °C (96 °F)   Resp 19   Ht 1.575 m (5' 2\")   Wt 118 kg (260 lb 9.6 oz)   SpO2 94%   BMI 47.66 kg/m²    PHYSICAL EXAM  Gen: Well appearing, in NAD  Eyes: EOMI  HEENT: MMM  Heart: RRR, no murmurs  Lungs: No increased work of breathing, CTAB, on RA  Extremities: WWP, cap refill <2sec  Neuro: Alert, symmetrical facies, moves all extremities equally  Skin: Scab visible on posterior left lower leg.  No warmth, swelling worse on the wound, drainage, bleeding.  She overall still has 1+ pitting edema of bilateral lower legs but this is not changed.  Psych: Appropriate mood and affect    Assessment/Plan     Problem List Items Addressed This Visit       Dependent edema    Current Assessment & Plan     Continue spironolactone.  Continue compression socks         Chronic diastolic (congestive) heart failure    Overview     Follows with cardiology         Wound of left leg - Primary    Current Assessment & Plan     Wound is not infected today.  Wound was cleaned and redressed in the office today.  Recommend continuing to keep the area clean and dry as it heals.  Continue compression socks daily.  She will let me know if she begins having worsening of the wound or if it seems like it is getting infected and I will call her in an antibiotic.          Elsie Ziegler D.O.  Family Medicine Physician  [facility]  [address] [place] [address] [phone]    This note has been transcribed using Dragon voice recognition system and there is a " possibility of unintentional typing misprints.

## 2025-03-10 NOTE — ASSESSMENT & PLAN NOTE
Wound is not infected today.  Wound was cleaned and redressed in the office today.  Recommend continuing to keep the area clean and dry as it heals.  Continue compression socks daily.  She will let me know if she begins having worsening of the wound or if it seems like it is getting infected and I will call her in an antibiotic.

## 2025-03-14 ENCOUNTER — TELEPHONE (OUTPATIENT)
Dept: PRIMARY CARE | Facility: CLINIC | Age: 75
End: 2025-03-14
Payer: MEDICARE

## 2025-03-19 ENCOUNTER — TELEPHONE (OUTPATIENT)
Dept: SLEEP MEDICINE | Facility: HOSPITAL | Age: 75
End: 2025-03-19
Payer: MEDICARE

## 2025-03-19 DIAGNOSIS — G47.34 SLEEP RELATED HYPOXIA: Primary | ICD-10-CM

## 2025-03-19 DIAGNOSIS — G47.33 OSA (OBSTRUCTIVE SLEEP APNEA): ICD-10-CM

## 2025-03-19 DIAGNOSIS — J44.9 CHRONIC OBSTRUCTIVE PULMONARY DISEASE, UNSPECIFIED COPD TYPE (MULTI): ICD-10-CM

## 2025-03-19 NOTE — TELEPHONE ENCOUNTER
Patient returned call and is agreeable to an In-Lab PSG with Oxygen titration study, preferably at the [facility].    Updated Dr. Shah.

## 2025-03-19 NOTE — TELEPHONE ENCOUNTER
Left VM for patient to return sleep nurse call at [phone] to discuss the need for a testing for Medicare to cover her nocturnal O2.  _________________________________________________    Per [facility],  Medicare guidelines state In-Lab PSG on CPAP with oxygen titration study to evaluate her oxygen need on CPAP.

## 2025-03-20 DIAGNOSIS — L08.9 SKIN INFECTION: Primary | ICD-10-CM

## 2025-03-20 RX ORDER — SULFAMETHOXAZOLE AND TRIMETHOPRIM 800; 160 MG/1; MG/1
1 TABLET ORAL 2 TIMES DAILY
Qty: 14 TABLET | Refills: 0 | Status: SHIPPED | OUTPATIENT
Start: 2025-03-20 | End: 2025-03-27

## 2025-03-20 NOTE — TELEPHONE ENCOUNTER
Patient called stating her wound on her leg got worse, it is red and leaking, patient was advised to go to the ER incase patient has an infection. Patient is scheduled for the wound clinic on 03/27/2025. A message was sent to the department to get patient in sooner. Please review. Possibly send patient an antibiotic for infection?

## 2025-03-27 ENCOUNTER — OFFICE VISIT (OUTPATIENT)
Dept: WOUND CARE | Facility: CLINIC | Age: 75
End: 2025-03-27
Payer: MEDICARE

## 2025-03-27 PROCEDURE — 99213 OFFICE O/P EST LOW 20 MIN: CPT

## 2025-04-01 ENCOUNTER — APPOINTMENT (OUTPATIENT)
Dept: PRIMARY CARE | Facility: CLINIC | Age: 75
End: 2025-04-01
Payer: MEDICARE

## 2025-04-01 VITALS
HEART RATE: 95 BPM | SYSTOLIC BLOOD PRESSURE: 130 MMHG | HEIGHT: 62 IN | OXYGEN SATURATION: 95 % | TEMPERATURE: 98.7 F | WEIGHT: 257.8 LBS | BODY MASS INDEX: 47.44 KG/M2 | RESPIRATION RATE: 20 BRPM | DIASTOLIC BLOOD PRESSURE: 80 MMHG

## 2025-04-01 DIAGNOSIS — E66.813 CLASS 3 SEVERE OBESITY DUE TO EXCESS CALORIES WITH SERIOUS COMORBIDITY AND BODY MASS INDEX (BMI) OF 45.0 TO 49.9 IN ADULT: ICD-10-CM

## 2025-04-01 DIAGNOSIS — H60.543 ECZEMA OF BOTH EXTERNAL EARS: Primary | ICD-10-CM

## 2025-04-01 DIAGNOSIS — E03.9 ACQUIRED HYPOTHYROIDISM: ICD-10-CM

## 2025-04-01 DIAGNOSIS — E66.01 CLASS 3 SEVERE OBESITY DUE TO EXCESS CALORIES WITH SERIOUS COMORBIDITY AND BODY MASS INDEX (BMI) OF 45.0 TO 49.9 IN ADULT: ICD-10-CM

## 2025-04-01 DIAGNOSIS — R60.9 DEPENDENT EDEMA: ICD-10-CM

## 2025-04-01 DIAGNOSIS — L40.9 SCALP PSORIASIS: ICD-10-CM

## 2025-04-01 DIAGNOSIS — G44.209 TENSION HEADACHE: ICD-10-CM

## 2025-04-01 DIAGNOSIS — R73.03 PRE-DIABETES: ICD-10-CM

## 2025-04-01 PROBLEM — S81.802A WOUND OF LEFT LEG: Status: RESOLVED | Noted: 2025-03-10 | Resolved: 2025-04-01

## 2025-04-01 PROBLEM — E78.2 MIXED HYPERLIPIDEMIA: Status: ACTIVE | Noted: 2023-08-27

## 2025-04-01 PROCEDURE — G0447 BEHAVIOR COUNSEL OBESITY 15M: HCPCS

## 2025-04-01 PROCEDURE — 3008F BODY MASS INDEX DOCD: CPT

## 2025-04-01 PROCEDURE — 3075F SYST BP GE 130 - 139MM HG: CPT

## 2025-04-01 PROCEDURE — G2211 COMPLEX E/M VISIT ADD ON: HCPCS

## 2025-04-01 PROCEDURE — 1159F MED LIST DOCD IN RCRD: CPT

## 2025-04-01 PROCEDURE — 1036F TOBACCO NON-USER: CPT

## 2025-04-01 PROCEDURE — 99214 OFFICE O/P EST MOD 30 MIN: CPT

## 2025-04-01 PROCEDURE — 3079F DIAST BP 80-89 MM HG: CPT

## 2025-04-01 RX ORDER — CYCLOSPORINE 0.5 MG/ML
1 EMULSION OPHTHALMIC 2 TIMES DAILY
COMMUNITY
Start: 2025-03-18

## 2025-04-01 RX ORDER — CLOBETASOL PROPIONATE 0.5 MG/G
AEROSOL, FOAM TOPICAL 2 TIMES DAILY PRN
Qty: 100 G | Refills: 5 | Status: SHIPPED | OUTPATIENT
Start: 2025-04-01

## 2025-04-01 RX ORDER — FLUOCINOLONE ACETONIDE 0.11 MG/ML
5 OIL AURICULAR (OTIC) 2 TIMES DAILY
Qty: 20 ML | Refills: 0 | Status: SHIPPED | OUTPATIENT
Start: 2025-04-01

## 2025-04-01 RX ORDER — CARBOXYMETHYLCELLULOSE SODIUM AND GLYCERIN 5; 9 MG/ML; MG/ML
1 SOLUTION/ DROPS OPHTHALMIC EVERY 12 HOURS
COMMUNITY

## 2025-04-01 ASSESSMENT — PATIENT HEALTH QUESTIONNAIRE - PHQ9
SUM OF ALL RESPONSES TO PHQ9 QUESTIONS 1 AND 2: 0
1. LITTLE INTEREST OR PLEASURE IN DOING THINGS: NOT AT ALL
2. FEELING DOWN, DEPRESSED OR HOPELESS: NOT AT ALL

## 2025-04-01 NOTE — ASSESSMENT & PLAN NOTE
Will see if can get Zepbound or Wegovy covered by her insurance to treat obesity and prediabetes.  Will also get new annual blood work including a new A1c to assess for diabetes.  Patient is unsure if she would be able to afford compounded semaglutide if her Medicare insurance will not cover the GLP-1's for weight loss only

## 2025-04-01 NOTE — PROGRESS NOTES
"Subjective   Helen Johnston is a 74 y.o. female   Both the patient's ears have been bothering her recently.  The left ear has been very itchy.  She also feels the hearing is decreased some in the left ear.  She has been using Q-tips a lot in both ears.  She had old Ciprodex drops from several months ago that she has tried some and she is not sure if it helps or not.    She reports having a headache on the top of her head occasionally.  She sometimes wakes up with it.  It does not happen every day.  There is no associated nausea, vomiting, light sensitivity, sound sensitivity.  Tylenol helps.    Patient would like to lose weight.  She has not been doing calorie tracking or exercise.  She is interested in GLP-1 medications.  She denies any personal or family history of medullary thyroid cancer.  No personal history of pancreatitis.  No known history of diabetes but she does have prediabetes.    Objective   /80   Pulse 95   Temp 37.1 °C (98.7 °F) (Tympanic)   Resp 20   Ht 1.575 m (5' 2\")   Wt 117 kg (257 lb 12.8 oz)   SpO2 95%   BMI 47.15 kg/m²    PHYSICAL EXAM  Gen: Well appearing, in NAD  Eyes: EOMI  HEENT: MMM. TMs normal. Significant left ear canal eczema, very mild in the right ear canal. Throat normal.  Heart: RRR, no murmurs  Lungs: No increased work of breathing, CTAB, on RA  GI: Soft, NTND, no guarding or rebound  Extremities: WWP, cap refill <2sec, no pitting edema in LE b/l  Neuro: Alert, symmetrical facies, moves all extremities equally  Psych: Appropriate mood and affect    Assessment/Plan     Problem List Items Addressed This Visit       Dependent edema    Overview     Continue wearing compression socks daily,         Relevant Medications    compression socks, large misc    Hypothyroidism    Overview     On levothyroxine 50 mcg daily         Relevant Orders    TSH with reflex to Free T4 if abnormal    Class 3 severe obesity due to excess calories with serious comorbidity and body mass index " (BMI) of 45.0 to 49.9 in adult    Overview     Counseled on healthy diet and regular exercise         Current Assessment & Plan     Will see if can get Zepbound or Wegovy covered by her insurance to treat obesity and prediabetes.  Will also get new annual blood work including a new A1c to assess for diabetes.  Patient is unsure if she would be able to afford compounded semaglutide if her Medicare insurance will not cover the GLP-1's for weight loss only         Relevant Medications    tirzepatide, weight loss, (Zepbound) 2.5 mg/0.5 mL injection    Other Relevant Orders    CBC    Comprehensive Metabolic Panel    Lipid Panel    Vitamin D 25-Hydroxy,Total (for eval of Vitamin D levels)    Vitamin B12    Pre-diabetes    Overview     Counseled on low carbohydrate diet         Relevant Medications    tirzepatide, weight loss, (Zepbound) 2.5 mg/0.5 mL injection    Other Relevant Orders    Hemoglobin A1C    Eczema of both external ears - Primary    Current Assessment & Plan     Will trial fluocinolone oil eardrops         Relevant Medications    fluocinolone (DermOtic) 0.01 % ear drops    Scalp psoriasis    Current Assessment & Plan     Will trial clobetasol foam for the scalp         Relevant Medications    clobetasol (Olux) 0.05 % topical foam    Tension headache    Overview     Continue Tylenol as needed          I spent greater than 15 minutes face-to-face with the individual providing recommendations for nutrition choices and an exercise plan to help achieve weight reduction.     Elsie Ziegler D.O.  Family Medicine Physician  [facility]  [address] [place] [address] [phone]    This note has been transcribed using Dragon voice recognition system and there is a possibility of unintentional typing misprints.

## 2025-04-02 ENCOUNTER — HOSPITAL ENCOUNTER (OUTPATIENT)
Dept: CARDIOLOGY | Facility: CLINIC | Age: 75
Discharge: HOME | End: 2025-04-02
Payer: MEDICARE

## 2025-04-02 DIAGNOSIS — Z95.818 IMPLANTABLE LOOP RECORDER PRESENT: ICD-10-CM

## 2025-04-02 DIAGNOSIS — I48.0 PAROXYSMAL ATRIAL FIBRILLATION (MULTI): ICD-10-CM

## 2025-04-02 LAB
25(OH)D3+25(OH)D2 SERPL-MCNC: 26 NG/ML (ref 30–100)
ALBUMIN SERPL-MCNC: 4.1 G/DL (ref 3.6–5.1)
ALP SERPL-CCNC: 90 U/L (ref 37–153)
ALT SERPL-CCNC: 10 U/L (ref 6–29)
ANION GAP SERPL CALCULATED.4IONS-SCNC: 7 MMOL/L (CALC) (ref 7–17)
AST SERPL-CCNC: 19 U/L (ref 10–35)
BILIRUB SERPL-MCNC: 0.8 MG/DL (ref 0.2–1.2)
BUN SERPL-MCNC: 17 MG/DL (ref 7–25)
CALCIUM SERPL-MCNC: 10.3 MG/DL (ref 8.6–10.4)
CHLORIDE SERPL-SCNC: 102 MMOL/L (ref 98–110)
CHOLEST SERPL-MCNC: 145 MG/DL
CHOLEST/HDLC SERPL: 3.6 (CALC)
CO2 SERPL-SCNC: 31 MMOL/L (ref 20–32)
CREAT SERPL-MCNC: 0.83 MG/DL (ref 0.6–1)
EGFRCR SERPLBLD CKD-EPI 2021: 74 ML/MIN/1.73M2
ERYTHROCYTE [DISTWIDTH] IN BLOOD BY AUTOMATED COUNT: 12.9 % (ref 11–15)
EST. AVERAGE GLUCOSE BLD GHB EST-MCNC: 128 MG/DL
EST. AVERAGE GLUCOSE BLD GHB EST-SCNC: 7.1 MMOL/L
GLUCOSE SERPL-MCNC: 83 MG/DL (ref 65–99)
HBA1C MFR BLD: 6.1 % OF TOTAL HGB
HCT VFR BLD AUTO: 42.6 % (ref 35–45)
HDLC SERPL-MCNC: 40 MG/DL
HGB BLD-MCNC: 13.8 G/DL (ref 11.7–15.5)
LDLC SERPL CALC-MCNC: 79 MG/DL (CALC)
MCH RBC QN AUTO: 28.3 PG (ref 27–33)
MCHC RBC AUTO-ENTMCNC: 32.4 G/DL (ref 32–36)
MCV RBC AUTO: 87.5 FL (ref 80–100)
NONHDLC SERPL-MCNC: 105 MG/DL (CALC)
PLATELET # BLD AUTO: 170 THOUSAND/UL (ref 140–400)
PMV BLD REES-ECKER: 11 FL (ref 7.5–12.5)
POTASSIUM SERPL-SCNC: 4.6 MMOL/L (ref 3.5–5.3)
PROT SERPL-MCNC: 7.5 G/DL (ref 6.1–8.1)
RBC # BLD AUTO: 4.87 MILLION/UL (ref 3.8–5.1)
SODIUM SERPL-SCNC: 140 MMOL/L (ref 135–146)
TRIGL SERPL-MCNC: 160 MG/DL
TSH SERPL-ACNC: 3.4 MIU/L (ref 0.4–4.5)
VIT B12 SERPL-MCNC: 379 PG/ML (ref 200–1100)
WBC # BLD AUTO: 5.5 THOUSAND/UL (ref 3.8–10.8)

## 2025-04-02 PROCEDURE — 93298 REM INTERROG DEV EVAL SCRMS: CPT

## 2025-04-03 ENCOUNTER — OFFICE VISIT (OUTPATIENT)
Dept: WOUND CARE | Facility: CLINIC | Age: 75
End: 2025-04-03
Payer: MEDICARE

## 2025-04-03 DIAGNOSIS — E66.813 CLASS 3 SEVERE OBESITY DUE TO EXCESS CALORIES WITH SERIOUS COMORBIDITY AND BODY MASS INDEX (BMI) OF 45.0 TO 49.9 IN ADULT: ICD-10-CM

## 2025-04-03 DIAGNOSIS — R73.03 PRE-DIABETES: ICD-10-CM

## 2025-04-03 DIAGNOSIS — E66.01 CLASS 3 SEVERE OBESITY DUE TO EXCESS CALORIES WITH SERIOUS COMORBIDITY AND BODY MASS INDEX (BMI) OF 45.0 TO 49.9 IN ADULT: ICD-10-CM

## 2025-04-03 DIAGNOSIS — E55.9 VITAMIN D DEFICIENCY: ICD-10-CM

## 2025-04-03 DIAGNOSIS — E78.2 MIXED HYPERLIPIDEMIA: Primary | ICD-10-CM

## 2025-04-03 PROCEDURE — 99212 OFFICE O/P EST SF 10 MIN: CPT

## 2025-04-03 RX ORDER — ATORVASTATIN CALCIUM 40 MG/1
40 TABLET, FILM COATED ORAL DAILY
Qty: 90 TABLET | Refills: 3 | Status: SHIPPED | OUTPATIENT
Start: 2025-04-03

## 2025-04-07 RX ORDER — TIRZEPATIDE 2.5 MG/.5ML
2.5 INJECTION, SOLUTION SUBCUTANEOUS
Refills: 0 | OUTPATIENT
Start: 2025-04-13

## 2025-04-07 NOTE — PROGRESS NOTES
Patient: Helen Johnston    [id number]  : 1950 -- AGE 74 y.o.    Provider: Melanie Shah MD PhD     Location [facility]   Service Date: 2025            [facility]  Followup Visit Note      HISTORY OF PRESENT ILLNESS     The patient's referring provider is: Melanie Shah MD PhD    REASON FOR VISIT:   Chief Complaint   Patient presents with    pt fuv        HISTORY OF PRESENT ILLNESS   Ms. Helen Johnston is a 74 y.o. female with past medical history of paroxysmal atrial fibrillation, idiopathic cardiomyopathy/HFpEF, GERD, hypothyroidism, mild intermittent asthma, HLD, HTN who presents to a [facility] for a sleep medicine follow up visit with JENI on CPAP, paroxysmal a-fib with implanted loop recorder, HTN, and COPD.     PAST SLEEP HISTORY  Diagnosed with JENI in , unsure of severity. Previously had a CPAP (managed by Dr. Young), but has not used it in 4 years at least. Thinks her setting was 13-16 cmH2O. Historically had poor CPAP compliance with FFM, unable to keep mask on longer than 3 hours/night. She had a Resmed S9 device.    She had had Afib since  in the setting of a reaction to vancomycin infusion with a need for cardioversion 8 times and had an eventual ablation .  Has an implanted loop recorder, followed by Dr. Rojas who referred her to sleep medicine.  Her A-fib is intermittent with longest duration of 18 min or so per patient report.    She had an HSAT on 10/10/2024 at a BMI of 40.7 with an ESS of 16.  The total monitoring time was 521 minutes.  All of the time was spent sleeping in the nonsupine position.  Her HEATHER 3% was 22 events/hour the HEATHER 4% was 14 events/hour.  The oxygen saturation demonstrated an SpO2 lizbet 71% and a T88 was 283 minutes.  Sustained hypoxemia was noted during periods of nonscoreable airflow limitation.    Patient had overnight oximetry testing on PAP on room air  on 1/9/2025 at home.  This demonstrated that she had a T88 of 5 hours and 2 minutes.  Unfortunately she was not able to obtain oxygen due to limitations in CMS criteria and we were told that she needed to undergo a sleep study with oxygen titration which is forthcoming.    CURRENT HISTORY    On today's visit, she reports that she is continuing attempt to use CPAP.  She did have some issues about a month ago where her dog had torn up the tubing and she was not able to use it until she got new supplies.  On CPAP she does not snore, gasp, or choke.  She thinks she feels more refreshed on average since she started using CPAP.  She does often get tired after lunch around noon and may nap for a few hours and feels more rested after that.    She has not yet been able to get her sleep test done to evaluate whether she needs the addition of oxygen to her CPAP circuit given her COPD and the recent nocturnal oximetry test that we had done.  It is currently scheduled for May 8.      Sleep schedule:  In bed: 11 PM  Activities in bed:   Bedtime Activities: use a computer/tablet/smartphone and TV  Subjective sleep latency:  10 min - 2 hours (if TV on)  Awakenings during night: 1-2x/night  Length of awakenings: 10 min to 1 hr (sometimes will go play on computer)  Final awakening time: 6AM  Out of bed: 7-9 AM  Overall estimate of total sleep time: 5-6h    Weekends: same  Preferred sleeping position: sidelying, elevated head of bed  Typically sleeps alone.     Daytime Symptoms:  On awakening patient reports:  Less sleep inertia when she wears CPAP.    Daytime: During the day, she does doze unintentionally while inactive.  During more active tasks, she sometimes is drowsy.  With regards to daytime napping, the patient reports sometimes taking naps. If she takes a nap, typically she awakens refreshed.  She does not report that poor sleep results in mood-related irritability. She does not report that poor sleep results in issues with  memory/concentration.    Driving History: She is retired, but drives still.  She is not a . With driving, the patient admits to sleepy driving, with 0 sleepiness-related motor vehicle accidents and 0 near misses.  She will take a quick nap at a rest stop, open windows, turn on AC, or talk on the phone.    ESS: 8  SALVADOR: 11  FOSQ:  30      REVIEW OF SYSTEMS     REVIEW OF SYSTEMS  Review of Systems   All other systems reviewed and are negative.        ALLERGIES AND MEDICATIONS     ALLERGIES  Allergies   Allergen Reactions    Lisinopril Anaphylaxis    Thiopental Shortness of breath    Vancomycin Shortness of breath    Cefuroxime Unknown    Erythromycin Swelling and Rash    Linezolid Itching and Swelling    Procainamide Other      Heart racing with dental procedure    Procaine Unknown    Silver Sulfadiazine Swelling and Other     SKIN BURNING       MEDICATIONS  Current Outpatient Medications   Medication Sig Dispense Refill    albuterol 90 mcg/actuation inhaler Inhale 2 puffs every 4 hours if needed for wheezing or shortness of breath (cough). 18 g 11    atorvastatin (Lipitor) 40 mg tablet Take 1 tablet (40 mg) by mouth once daily. 90 tablet 3    bacitracin ophthalmic ointment APPLY 1/4 INCH TO BOTH EYES AT BEDTIME      budesonide-glycopyr-formoterol (Breztri Aerosphere) 160-9-4.8 mcg/actuation HFA aerosol inhaler Inhale 2 puffs 2 times a day.      carboxymethylcellulose-glycern (Refresh Optive) 0.5-0.9 % drops Administer 1 drop into affected eye(s) every 12 hours.      cetirizine (ZYRTEC) 10 mg capsule Take by mouth.      cholecalciferol (Vitamin D-3) 50,000 unit capsule Take 1 capsule (50,000 Units) by mouth once a week.      clobetasol (Olux) 0.05 % topical foam Apply topically 2 times a day as needed (scalp psoriasis). 100 g 5    compression socks, large misc 2 each once daily. 2 each 0    cycloSPORINE 0.05 % drops Administer 1 drop into affected eye(s) every 12 hours.      fluocinolone (DermOtic)  0.01 % ear drops Administer 5 drops into each ear 2 times a day. 20 mL 0    hydrocortisone-iodoquinoL (Dermazene) 1-1 % cream in packet Apply topically.      ipratropium-albuteroL (Duo-Neb) 0.5-2.5 mg/3 mL nebulizer solution Take 3 mL by nebulization every 4 hours if needed for wheezing or shortness of breath. 360 mL 3    levothyroxine (Synthroid, Levoxyl) 50 mcg tablet Take 1 tablet (50 mcg) by mouth once daily.      lifitegrast 5 % dropperette Administer 1 drop into affected eye(s) every 12 hours.      loteprednol (Lotemax) 0.5 % ophthalmic suspension 1 drop.      metoprolol succinate XL (Toprol-XL) 50 mg 24 hr tablet Take 1 tablet (50 mg) by mouth once daily at bedtime. 90 tablet 3    neomycin-bacitracin-polymyxin-hydrocortisone (Cortisporin) 3.5-400-10,000 mg-unit/g-1% ophthalmic ointment Apply to affected eye(s).      nystatin (Mycostatin) 100,000 unit/gram powder Apply topically. Apply to affected areas 2-3 times daily      olopatadine (Patanol) 0.1 % ophthalmic solution Administer into affected eye(s) every 12 hours.      pantoprazole (ProtoNix) 40 mg EC tablet Take 1 tablet (40 mg) by mouth once daily in the morning. Take before meals.      peg 400-propylene glycol (Systane, propylene glycoL,) 0.4-0.3 % drops ophthalmic drops 1 each.      prednisoLONE acetate (Pred-Forte) 1 % ophthalmic suspension Administer into affected eye(s) 2 times a day as needed.      Restasis 0.05 % ophthalmic emulsion Administer 1 drop into both eyes 2 times a day.      rivaroxaban (Xarelto) 20 mg tablet Take 1 tablet (20 mg) by mouth once daily in the evening. Take with meals. 90 tablet 3    spironolactone (Aldactone) 25 mg tablet Take 1 tablet (25 mg) by mouth once daily. 90 tablet 3    tirzepatide, weight loss, (Zepbound) 2.5 mg/0.5 mL injection Inject 2.5 mg under the skin every 7 days. 4 each 0    tobramycin-dexamethasone (Tobradex) ophthalmic ointment Place into both eyes nightly Apply to both eyes nightly.      triamcinolone  (Kenalog) 0.1 % ointment Apply topically.      urea (Carmol) 40 % cream Apply topically.      Xdemvy 0.25 % drops INSTILL 1 DROP INTO BOTH EYES TWICE DAILY FOR 6 WEEKS       No current facility-administered medications for this visit.     PAST HISTORY     PAST MEDICAL HISTORY  She  has a past medical history of Diabetes mellitus (Multi).     PAST SURGICAL HISTORY:  History reviewed. No pertinent surgical history.    FAMILY HISTORY  No family history on file.  She does not have a family history of sleep disorder (e.g., sleep apnea, narcolepsy in any first degree relatives).      SOCIAL HISTORY  She  reports that she has never smoked. She has never used smokeless tobacco. She reports that she does not drink alcohol and does not use drugs. She currently lives alone.     Work history: The patient currently has been retired, but previously worked as  in [place].     Caffeine consumption: Yes - tea or 1 coke daily  Alcohol consumption: Yes - only 1x/year  Smoking: No  Marijuana: No      PHYSICAL EXAM     Physical Examination: BP 95/62   Pulse 91   Temp 36.7 °C (98 °F) (Temporal)   Wt 117 kg (257 lb 1.6 oz)   SpO2 93%   BMI 47.02 kg/m²     *Patient reports home oxygen saturation spot checks ranged from 93 to 95% on room air.  With her resting in clinic and not talking, pulse oximetry around 88%.  After 6 deep breaths oxygen saturation increased to 93% on room air.    PREVIOUS WEIGHTS:  Wt Readings from Last 3 Encounters:   04/08/25 117 kg (257 lb 1.6 oz)   04/01/25 117 kg (257 lb 12.8 oz)   03/10/25 118 kg (260 lb 9.6 oz)       General: The patient is a pleasant female, in no acute distress. HEENT: She has a  a modified Mallampati grade  3-4  airway with Numbers; 0-4 (with +): No tonsils bilaterally. The soft palate was low hanging and the uvula was thickened. The A/P diameter of the velopharynx was not narrowed. The oropharynx was not shallow in the A/P diameter. Lateral wall narrowing was  "mildly present. Tongue ridging was present.  Retrognathia  and micrognathia  present. Neck: The neck was enlarged. No JVP, bruits or lymphadenopathy was appreciated. Chest: Clear to auscultation. No wheezes, rales, or rhonchi.  Shortness of breath present and mild difficulty speaking in sentences.  Cardiovascular: Regular rate and rhythm. No murmurs, gallops, or clicks. Abdomen: Soft, nontender, nondistended. Positive bowel sounds. Extremities: No clubbing, cyanosis, or edema is noted. Neurologic exam: Alert, oriented x3 and was grossly non-focal.    RESULTS/DATA     No results found for: \"IRON\", \"TRANSFERRIN\", \"IRONSAT\", \"TIBC\", \"FERRITIN\"    CARBON DIOXIDE (mmol/L)   Date Value   04/01/2025 31     Bicarbonate (mmol/L)   Date Value   10/31/2024 32   06/11/2024 30   12/28/2023 34 (H)     PFTs 1/29/25      Oxygen Desaturation Test 1/29/25: Sats at 94% and fell to 93% with walking and recovered to 95%. Test conducted on room air.      PAP ADHERENCE REPORT  Dates: 1/7/25 - 4/6/25  Use: 61%  > 4h/night use: 43%  rAHI: 1.3/h        DIAGNOSES     Problem List and Orders  Diagnoses and all orders for this visit:  Sleep related hypoxia  JENI (obstructive sleep apnea)  -     Follow Up In Adult Sleep Medicine  -     Follow Up In Adult Sleep Medicine; Future  Chronic obstructive pulmonary disease, unspecified COPD type (Multi)  Paroxysmal atrial fibrillation (Multi)  Obesity, Class III, BMI 40-49.9 (morbid obesity) (Multi)        ASSESSMENT/PLAN     Ms. Johnston is a 74 y.o. female and with past medical history of paroxysmal atrial fibrillation, JENI, morbid obesity, idiopathic cardiomyopathy/HFpEF, GERD, hypothyroidism, mild intermittent asthma, HLD, HTN. She presents to  the [facility] to address JENI.  Historically, she was diagnosed with JENI of unknown severity in 2006 and was using CPAP; however, she struggled with compliance due to discomfort.  Since restarting CPAP with nasal pillow, patient " is making some progression with PAP therapy, but needs to work on consistency and days of use.  Shortness of breath, dyspnea on exertion and intermittent hypoxia on room air today is concerning for patient's COPD status.  Referral to pulmonology, pulmonary function testing, exercise desaturation testing, and nocturnal pulse oximetry while on CPAP and room air ordered today.  Patient expressed understanding and was agreeable with plan.    #Obstructive Sleep apnea  - History of JENI and difficulty with CPAP compliance  - Continue APAP 4-12 cmw with NP  - She is using and benefitting from PAP use  - Encouraged increased consistency with use    #COPD  -Follows with Kimmy Mcwilliams  -Plan to perform repeat sleep testing on CPAP to see whether or not oxygen is required.  Study scheduled for May 8.      #Paroxysmal atrial fibrillation  - Discussed with patient the relationship between atrial fibrillation and JENI  - Follow up with cardiology    #Obesity  - Discussed the role of weight management  in JENI  - Encourage patient to promote healthy lifestyle habits    Follow up: 3 months      Melanie Shah MD PhD

## 2025-04-08 ENCOUNTER — OFFICE VISIT (OUTPATIENT)
Dept: SLEEP MEDICINE | Facility: HOSPITAL | Age: 75
End: 2025-04-08
Payer: MEDICARE

## 2025-04-08 VITALS
TEMPERATURE: 98 F | DIASTOLIC BLOOD PRESSURE: 62 MMHG | OXYGEN SATURATION: 93 % | WEIGHT: 257.1 LBS | HEART RATE: 91 BPM | BODY MASS INDEX: 47.02 KG/M2 | SYSTOLIC BLOOD PRESSURE: 95 MMHG

## 2025-04-08 DIAGNOSIS — E66.01 OBESITY, CLASS III, BMI 40-49.9 (MORBID OBESITY) (MULTI): ICD-10-CM

## 2025-04-08 DIAGNOSIS — J44.9 CHRONIC OBSTRUCTIVE PULMONARY DISEASE, UNSPECIFIED COPD TYPE (MULTI): ICD-10-CM

## 2025-04-08 DIAGNOSIS — G47.33 OSA (OBSTRUCTIVE SLEEP APNEA): ICD-10-CM

## 2025-04-08 DIAGNOSIS — G47.34 SLEEP RELATED HYPOXIA: Primary | ICD-10-CM

## 2025-04-08 DIAGNOSIS — I48.0 PAROXYSMAL ATRIAL FIBRILLATION (MULTI): ICD-10-CM

## 2025-04-08 PROCEDURE — 3078F DIAST BP <80 MM HG: CPT | Performed by: INTERNAL MEDICINE

## 2025-04-08 PROCEDURE — G2211 COMPLEX E/M VISIT ADD ON: HCPCS | Performed by: INTERNAL MEDICINE

## 2025-04-08 PROCEDURE — 1126F AMNT PAIN NOTED NONE PRSNT: CPT | Performed by: INTERNAL MEDICINE

## 2025-04-08 PROCEDURE — 1159F MED LIST DOCD IN RCRD: CPT | Performed by: INTERNAL MEDICINE

## 2025-04-08 PROCEDURE — 99214 OFFICE O/P EST MOD 30 MIN: CPT | Performed by: INTERNAL MEDICINE

## 2025-04-08 PROCEDURE — 1036F TOBACCO NON-USER: CPT | Performed by: INTERNAL MEDICINE

## 2025-04-08 PROCEDURE — 1160F RVW MEDS BY RX/DR IN RCRD: CPT | Performed by: INTERNAL MEDICINE

## 2025-04-08 PROCEDURE — 3074F SYST BP LT 130 MM HG: CPT | Performed by: INTERNAL MEDICINE

## 2025-04-08 ASSESSMENT — LIFESTYLE VARIABLES
SKIP TO QUESTIONS 9-10: 1
HOW MANY STANDARD DRINKS CONTAINING ALCOHOL DO YOU HAVE ON A TYPICAL DAY: PATIENT DOES NOT DRINK
HOW OFTEN DO YOU HAVE A DRINK CONTAINING ALCOHOL: NEVER
AUDIT-C TOTAL SCORE: 0
HOW OFTEN DO YOU HAVE SIX OR MORE DRINKS ON ONE OCCASION: NEVER

## 2025-04-08 ASSESSMENT — PAIN SCALES - GENERAL: PAINLEVEL_OUTOF10: 0-NO PAIN

## 2025-04-08 NOTE — PATIENT INSTRUCTIONS
[facility] Sleep Medicine  [facility] [place]  [address]  [facility] [address]  [phone]    [facility] [place]  [address]  [facility] [address]  [phone]  [facility] [place]  [address]  [facility] 6TH FLOOR  [facility] [address]           NAME: Helen Johnston   DATE: 4/8/2025     Your Sleep Provider Today: Melanie Shah MD PhD  Your Primary Care Physician: Elsie Ziegler, DO   Your Referring Provider: Melanie Shah MD PhD    Thank you for coming to the Sleep Medicine Clinic today! Your sleep medicine provider today was: Melanie Shah MD PhD Below is a summary of your treatment plan, other important information, and our contact numbers:  If you need to schedule an appointment, please call [phone])  If you need general assistance (e.g. forms completed, general questions), please call my , Gloria, at [phone].  If you have a medical question about your sleep issues, please contact our nurses, Radha or Mehnaz at [phone].   You can also contact us through [employer].      DIAGNOSIS:   1. JENI (obstructive sleep apnea)  Follow Up In Adult Sleep Medicine              TREATMENT PLAN     Get sleep study done in May to see if oxygen needed at night    Instructions - Common JENI Recs: - For your sleep apnea, continue to use your PAP every night and use it whenever you are sleeping.   - Avoid alcohol or sedatives several hours prior to sleeping.   - Get additional supplies for your PAP (e.g., mask, hose, filters) every 3 months or as your insurance allows from your [employer] company. Replacement cushions for your PAP mask can be requested monthly if airseals are an issue.  - Remember to clean your mask, tubings, and water chamber regularly as instructed.  - Avoid driving or operating heavy machinery when drowsy. A person driving while sleepy  is five (5) times more likely to have an accident. If you feel sleepy, pull over and take a short power nap (sleep for less than 30 minutes). Otherwise, ask somebody to drive you.      Follow-up Appointment:   Followup with me in 6 months.      IMPORTANT INFORMATION     Call 911 for medical emergencies.  Our offices are generally open from Monday-Friday, 9 am - 5 pm.  If you need to get in touch with me, you may either call me and my team(number is below) or you can use [employer].  If a referral for a test, for CPAP, or for another specialist was made, and you have not heard about scheduling this within a week, please call scheduling at [phone] ([phone]).  If you are unable to make your appointment for clinic or an overnight study, kindly call the office at least 48 hours in advance to cancel and reschedule.  If you are on CPAP, please bring your device's card or the device to each clinic appointment.   There are no supporting services by either the sleep doctors or their staff on weekends and Holidays, or after 5 PM on weekdays.   If you have been asked to come to a sleep study, make sure you bring toiletries, a comfy pillow, and any nighttime medications that you may regularly take. Also be sure to eat dinner before you arrive. We generally do not provide meals.      PRESCRIPTIONS     We require 7 days advanced notice for prescription refills. If we do not receive the request in this time, we cannot guarantee that your medication will be refilled in time.      IMPORTANT PHONE NUMBERS      scheduling for medical testin061 - [phone]   Sleep Medicine Clinic Fax: [phone]  Appointments (for Pediatric Sleep Clinic): [phone] - option 1  Appointments (for Adult Sleep Clinic): [phone] - option 2  Appointments (For Sleep Studies): [phone] - option 3  Financial Assistance Program: Call [phone] (For [facility] services: call [phone])  Website:  [employer]  "Assistance  Behavioral Sleep Medicine: [phone]  Bariatric Surgery: [phone] ( Bariatric Surgery Website)   Sleep Surgery: [phone]  ENT (Otolaryngology): [phone]  Myofunctional Therapy (ENT): BELLO Morillo, Jeri Joe, [place]; [phone]   Tom Ventura; [phone]  Nikia Connelly; [phone]  [facility]; [phone]  Licha Duvall/[facility] [phone] (option 1)  Headache Clinic (Neurology): [phone]  Neurology: [phone]  Psychiatry: [phone]  Pulmonary Function Testing (PFT) Center: [phone]  Pulmonary Medicine: [phone]  [employer] (DME): [phone]  [employer] (DME): [phone]  [facility] (DME): [phone]      COMMON PROVIDERS WE REFER TO     For Weight Loss - Dr. Leticia Zamora - Call [phone]  For Sleep Surgery - Dr. Maria Alejandra Leon - Call [phone]      OUR ADULT SLEEP MEDICINE TEAM   Please do not hesitate to call the office or sleep nurse with any questions between appointments:    Adult Sleep Nurses (Mehnaz Howe, RN and Verena López RN):  For clinical questions and refilling prescriptions: [phone]  Email sleep diaries and other documents at: [email or web address]    My :  Beverley Lackey   P: [phone]  F: [phone]      CONTACTING YOUR SLEEP MEDICINE PROVIDER     Send a message directly to your provider through \"My Chart\", which is the email service through your  Records Account: [email or web address]   Call [phone] and leave a message. One of the administrative assistants will forward the message to your sleep medicine provider through \"My Chart\" and/or email.     Your sleep medicine provider for this visit was: Melanie Shah MD PhD        "

## 2025-05-07 PROBLEM — H52.213 IRREGULAR ASTIGMATISM, BILATERAL: Status: ACTIVE | Noted: 2025-03-17

## 2025-05-07 PROBLEM — H18.523 EPITHELIAL (JUVENILE) CORNEAL DYSTROPHY, BILATERAL: Status: ACTIVE | Noted: 2025-02-20

## 2025-05-08 ENCOUNTER — CLINICAL SUPPORT (OUTPATIENT)
Dept: SLEEP MEDICINE | Facility: CLINIC | Age: 75
End: 2025-05-08
Payer: MEDICARE

## 2025-05-08 VITALS — HEIGHT: 62 IN | WEIGHT: 257.94 LBS | BODY MASS INDEX: 47.47 KG/M2

## 2025-05-08 DIAGNOSIS — G47.33 OSA (OBSTRUCTIVE SLEEP APNEA): ICD-10-CM

## 2025-05-08 DIAGNOSIS — J44.9 CHRONIC OBSTRUCTIVE PULMONARY DISEASE, UNSPECIFIED COPD TYPE (MULTI): ICD-10-CM

## 2025-05-08 DIAGNOSIS — G47.34 SLEEP RELATED HYPOXIA: ICD-10-CM

## 2025-05-09 ASSESSMENT — SLEEP AND FATIGUE QUESTIONNAIRES
ESS-CHAD TOTAL SCORE: 10
HOW LIKELY ARE YOU TO NOD OFF OR FALL ASLEEP WHILE SITTING QUIETLY AFTER LUNCH WITHOUT ALCOHOL: SLIGHT CHANCE OF DOZING
HOW LIKELY ARE YOU TO NOD OFF OR FALL ASLEEP WHILE LYING DOWN TO REST IN THE AFTERNOON WHEN CIRCUMSTANCES PERMIT: MODERATE CHANCE OF DOZING
HOW LIKELY ARE YOU TO NOD OFF OR FALL ASLEEP WHILE SITTING AND READING: SLIGHT CHANCE OF DOZING
HOW LIKELY ARE YOU TO NOD OFF OR FALL ASLEEP WHILE SITTING AND TALKING TO SOMEONE: SLIGHT CHANCE OF DOZING
SITING INACTIVE IN A PUBLIC PLACE LIKE A CLASS ROOM OR A MOVIE THEATER: SLIGHT CHANCE OF DOZING
HOW LIKELY ARE YOU TO NOD OFF OR FALL ASLEEP WHILE WATCHING TV: SLIGHT CHANCE OF DOZING
HOW LIKELY ARE YOU TO NOD OFF OR FALL ASLEEP IN A CAR, WHILE STOPPED FOR A FEW MINUTES IN TRAFFIC: MODERATE CHANCE OF DOZING
HOW LIKELY ARE YOU TO NOD OFF OR FALL ASLEEP WHEN YOU ARE A PASSENGER IN A CAR FOR AN HOUR WITHOUT A BREAK: SLIGHT CHANCE OF DOZING

## 2025-05-09 NOTE — PROGRESS NOTES
[facility] TECH NOTE:     Patient: Helen Johnston   MRN//AGE: [id number]  1950  74 y.o.   Technologist: Augustus Torres   Room: 1   Service Date: 2025        Sleep Testing Location: [facility] [place]:     TECHNOLOGIST SLEEP STUDY PROCEDURE NOTE:   This sleep study is being conducted according to the policies and procedures outlined by the AAS accreditation standards.  The sleep study procedure and processes involved during this appointment was explained to the patient/patient’s family, questions were answered. The patient/family verbalized understanding.      The patient is a 74 y.o. year old female scheduled for a CPAP titration with montage of: Standard PSG CPAP. She arrived for her appointment.      The study that was ultimately completed was a CPAP titration with montage of: Standard PSG CPAP.    The full study Was completed.  Patient questionnaires completed?: yes     Consents signed? yes    Initial Fall Risk Screening:     Helen has fallen in the last 6 months. Helen does have a fear of falling. She does not need assistance with sitting, standing, or walking. She does not need assistance walking in her home. She does not need assistance in an unfamiliar setting. The patient is not using an assistive device. (Rollator)    Brief Study observations: Supine sleep encouraged. Patient stated she has difficulty sleeping supine without an elevated bed. CPAP initiated using a ResMed AirFit P10 nasal pillows size medium, similar to home mask. Due to high mask leakage, change nasal pillows to size small. Observed mouth breathing causing respiratory events with low desats. Chin strap applied, patient could not tolerate chin strap due to claustrophobic. Full face mask was offer, patient decline. Patient request to use home mask but home mask was broken due to patient's dog. Mask switch back to ResMed AirFit P10 nasal pillows size medium. Patient stated starting at pressure [id number] is causing bubbles in mouth in which  she has to mouth breath, patient stated home CPAP machine ramps up. Patient request ramping up to [id number]. Even with slowly ramping up, patient continues to mouth breath, attempt with chin strap, patient decline. [place] EPR 2 to possible help with leakage, adjust humidifier as well. Approx. 0013, change mask to F&P Neli full face size s/m due to mouth breathing. Patient was agreeable to try full face mask if pressure slowly ramps up. Patient remained on left side throughout study.     Deviation to order/protocol and reason: N/A      If PAP, which was preferred mask/pressure/mode: F&P Neli full face size s/m      Other:None    After the procedure, the patient/family was informed to ensure follow up with ordering clinician for testing results.      Technologist: Augustus Torres

## 2025-05-12 ENCOUNTER — HOSPITAL ENCOUNTER (OUTPATIENT)
Dept: CARDIOLOGY | Facility: CLINIC | Age: 75
Discharge: HOME | End: 2025-05-12
Payer: MEDICARE

## 2025-05-12 DIAGNOSIS — I48.0 PAROXYSMAL ATRIAL FIBRILLATION (MULTI): ICD-10-CM

## 2025-05-12 DIAGNOSIS — Z95.818 IMPLANTABLE LOOP RECORDER PRESENT: ICD-10-CM

## 2025-05-12 PROCEDURE — 93298 REM INTERROG DEV EVAL SCRMS: CPT

## 2025-05-16 ENCOUNTER — TELEPHONE (OUTPATIENT)
Dept: SLEEP MEDICINE | Facility: HOSPITAL | Age: 75
End: 2025-05-16
Payer: MEDICARE

## 2025-05-16 DIAGNOSIS — J44.9 CHRONIC OBSTRUCTIVE PULMONARY DISEASE, UNSPECIFIED COPD TYPE (MULTI): ICD-10-CM

## 2025-05-16 DIAGNOSIS — G47.33 OSA (OBSTRUCTIVE SLEEP APNEA): Primary | ICD-10-CM

## 2025-05-16 DIAGNOSIS — G47.34 SLEEP RELATED HYPOXIA: ICD-10-CM

## 2025-05-16 NOTE — PROGRESS NOTES
New order for oxygen titration placed as last study needs to be repeated as oxygen was not started that night.

## 2025-05-16 NOTE — TELEPHONE ENCOUNTER
Spoke to pt regarding upcoming sleep study. Answered questions, gave number to [place] sleep lab, gave sleep nurse number

## 2025-05-18 ENCOUNTER — APPOINTMENT (OUTPATIENT)
Dept: CARDIOLOGY | Facility: HOSPITAL | Age: 75
End: 2025-05-18
Payer: MEDICARE

## 2025-05-18 ENCOUNTER — HOSPITAL ENCOUNTER (OUTPATIENT)
Facility: HOSPITAL | Age: 75
Setting detail: OBSERVATION
Discharge: HOME | End: 2025-05-19
Attending: STUDENT IN AN ORGANIZED HEALTH CARE EDUCATION/TRAINING PROGRAM | Admitting: STUDENT IN AN ORGANIZED HEALTH CARE EDUCATION/TRAINING PROGRAM
Payer: MEDICARE

## 2025-05-18 ENCOUNTER — APPOINTMENT (OUTPATIENT)
Dept: RADIOLOGY | Facility: HOSPITAL | Age: 75
End: 2025-05-18
Payer: MEDICARE

## 2025-05-18 DIAGNOSIS — R06.09 OTHER FORMS OF DYSPNEA: ICD-10-CM

## 2025-05-18 DIAGNOSIS — E66.01 MORBID OBESITY (MULTI): ICD-10-CM

## 2025-05-18 DIAGNOSIS — I47.19 ATRIAL TACHYCARDIA: ICD-10-CM

## 2025-05-18 DIAGNOSIS — J44.9 CHRONIC OBSTRUCTIVE PULMONARY DISEASE, UNSPECIFIED COPD TYPE (MULTI): Primary | ICD-10-CM

## 2025-05-18 DIAGNOSIS — J44.89 COPD WITH ASTHMA (MULTI): ICD-10-CM

## 2025-05-18 DIAGNOSIS — J44.1 COPD EXACERBATION (MULTI): ICD-10-CM

## 2025-05-18 DIAGNOSIS — R05.1 ACUTE COUGH: ICD-10-CM

## 2025-05-18 DIAGNOSIS — I20.89 ATYPICAL ANGINA: ICD-10-CM

## 2025-05-18 DIAGNOSIS — I50.32 CHRONIC DIASTOLIC (CONGESTIVE) HEART FAILURE: ICD-10-CM

## 2025-05-18 DIAGNOSIS — I48.0 PAROXYSMAL ATRIAL FIBRILLATION (MULTI): ICD-10-CM

## 2025-05-18 DIAGNOSIS — I50.30 UNSPECIFIED DIASTOLIC (CONGESTIVE) HEART FAILURE: ICD-10-CM

## 2025-05-18 LAB
ALBUMIN SERPL BCP-MCNC: 4 G/DL (ref 3.4–5)
ALP SERPL-CCNC: 85 U/L (ref 33–136)
ALT SERPL W P-5'-P-CCNC: 10 U/L (ref 7–45)
ANION GAP SERPL CALC-SCNC: 12 MMOL/L (ref 10–20)
AST SERPL W P-5'-P-CCNC: 23 U/L (ref 9–39)
BASOPHILS # BLD AUTO: 0.02 X10*3/UL (ref 0–0.1)
BASOPHILS NFR BLD AUTO: 0.5 %
BILIRUB SERPL-MCNC: 0.8 MG/DL (ref 0–1.2)
BNP SERPL-MCNC: 70 PG/ML (ref 0–99)
BUN SERPL-MCNC: 15 MG/DL (ref 6–23)
CALCIUM SERPL-MCNC: 10.3 MG/DL (ref 8.6–10.3)
CARDIAC TROPONIN I PNL SERPL HS: 21 NG/L (ref 0–13)
CARDIAC TROPONIN I PNL SERPL HS: 22 NG/L (ref 0–13)
CHLORIDE SERPL-SCNC: 98 MMOL/L (ref 98–107)
CO2 SERPL-SCNC: 31 MMOL/L (ref 21–32)
CREAT SERPL-MCNC: 0.9 MG/DL (ref 0.5–1.05)
EGFRCR SERPLBLD CKD-EPI 2021: 68 ML/MIN/1.73M*2
EOSINOPHIL # BLD AUTO: 0.02 X10*3/UL (ref 0–0.4)
EOSINOPHIL NFR BLD AUTO: 0.5 %
ERYTHROCYTE [DISTWIDTH] IN BLOOD BY AUTOMATED COUNT: 13.5 % (ref 11.5–14.5)
FLUAV RNA RESP QL NAA+PROBE: NOT DETECTED
FLUBV RNA RESP QL NAA+PROBE: NOT DETECTED
GLUCOSE SERPL-MCNC: 114 MG/DL (ref 74–99)
HCT VFR BLD AUTO: 46.1 % (ref 36–46)
HGB BLD-MCNC: 14.5 G/DL (ref 12–16)
IMM GRANULOCYTES # BLD AUTO: 0.01 X10*3/UL (ref 0–0.5)
IMM GRANULOCYTES NFR BLD AUTO: 0.2 % (ref 0–0.9)
LYMPHOCYTES # BLD AUTO: 0.57 X10*3/UL (ref 0.8–3)
LYMPHOCYTES NFR BLD AUTO: 13 %
MCH RBC QN AUTO: 28.5 PG (ref 26–34)
MCHC RBC AUTO-ENTMCNC: 31.5 G/DL (ref 32–36)
MCV RBC AUTO: 91 FL (ref 80–100)
MONOCYTES # BLD AUTO: 0.58 X10*3/UL (ref 0.05–0.8)
MONOCYTES NFR BLD AUTO: 13.2 %
NEUTROPHILS # BLD AUTO: 3.18 X10*3/UL (ref 1.6–5.5)
NEUTROPHILS NFR BLD AUTO: 72.6 %
NRBC BLD-RTO: 0 /100 WBCS (ref 0–0)
PLATELET # BLD AUTO: 152 X10*3/UL (ref 150–450)
POTASSIUM SERPL-SCNC: 4.5 MMOL/L (ref 3.5–5.3)
PROT SERPL-MCNC: 7.9 G/DL (ref 6.4–8.2)
RBC # BLD AUTO: 5.08 X10*6/UL (ref 4–5.2)
RSV RNA RESP QL NAA+PROBE: NOT DETECTED
SARS-COV-2 RNA RESP QL NAA+PROBE: NOT DETECTED
SODIUM SERPL-SCNC: 136 MMOL/L (ref 136–145)
TSH SERPL-ACNC: 2.01 MIU/L (ref 0.44–3.98)
WBC # BLD AUTO: 4.4 X10*3/UL (ref 4.4–11.3)

## 2025-05-18 PROCEDURE — 83880 ASSAY OF NATRIURETIC PEPTIDE: CPT | Performed by: STUDENT IN AN ORGANIZED HEALTH CARE EDUCATION/TRAINING PROGRAM

## 2025-05-18 PROCEDURE — 36415 COLL VENOUS BLD VENIPUNCTURE: CPT | Performed by: STUDENT IN AN ORGANIZED HEALTH CARE EDUCATION/TRAINING PROGRAM

## 2025-05-18 PROCEDURE — 93005 ELECTROCARDIOGRAM TRACING: CPT

## 2025-05-18 PROCEDURE — 99285 EMERGENCY DEPT VISIT HI MDM: CPT | Mod: 25 | Performed by: STUDENT IN AN ORGANIZED HEALTH CARE EDUCATION/TRAINING PROGRAM

## 2025-05-18 PROCEDURE — 87632 RESP VIRUS 6-11 TARGETS: CPT

## 2025-05-18 PROCEDURE — 71045 X-RAY EXAM CHEST 1 VIEW: CPT

## 2025-05-18 PROCEDURE — 84484 ASSAY OF TROPONIN QUANT: CPT | Performed by: STUDENT IN AN ORGANIZED HEALTH CARE EDUCATION/TRAINING PROGRAM

## 2025-05-18 PROCEDURE — 96375 TX/PRO/DX INJ NEW DRUG ADDON: CPT

## 2025-05-18 PROCEDURE — 71045 X-RAY EXAM CHEST 1 VIEW: CPT | Performed by: RADIOLOGY

## 2025-05-18 PROCEDURE — 96365 THER/PROPH/DIAG IV INF INIT: CPT

## 2025-05-18 PROCEDURE — G0378 HOSPITAL OBSERVATION PER HR: HCPCS

## 2025-05-18 PROCEDURE — 84443 ASSAY THYROID STIM HORMONE: CPT

## 2025-05-18 PROCEDURE — 84075 ASSAY ALKALINE PHOSPHATASE: CPT | Performed by: STUDENT IN AN ORGANIZED HEALTH CARE EDUCATION/TRAINING PROGRAM

## 2025-05-18 PROCEDURE — 2500000001 HC RX 250 WO HCPCS SELF ADMINISTERED DRUGS (ALT 637 FOR MEDICARE OP): Performed by: INTERNAL MEDICINE

## 2025-05-18 PROCEDURE — 2500000005 HC RX 250 GENERAL PHARMACY W/O HCPCS: Performed by: STUDENT IN AN ORGANIZED HEALTH CARE EDUCATION/TRAINING PROGRAM

## 2025-05-18 PROCEDURE — 85025 COMPLETE CBC W/AUTO DIFF WBC: CPT | Performed by: STUDENT IN AN ORGANIZED HEALTH CARE EDUCATION/TRAINING PROGRAM

## 2025-05-18 PROCEDURE — 2500000004 HC RX 250 GENERAL PHARMACY W/ HCPCS (ALT 636 FOR OP/ED)

## 2025-05-18 PROCEDURE — 2500000005 HC RX 250 GENERAL PHARMACY W/O HCPCS

## 2025-05-18 PROCEDURE — 2500000002 HC RX 250 W HCPCS SELF ADMINISTERED DRUGS (ALT 637 FOR MEDICARE OP, ALT 636 FOR OP/ED): Performed by: INTERNAL MEDICINE

## 2025-05-18 PROCEDURE — 2500000004 HC RX 250 GENERAL PHARMACY W/ HCPCS (ALT 636 FOR OP/ED): Mod: JZ | Performed by: STUDENT IN AN ORGANIZED HEALTH CARE EDUCATION/TRAINING PROGRAM

## 2025-05-18 PROCEDURE — 84145 PROCALCITONIN (PCT): CPT | Mod: STJLAB

## 2025-05-18 PROCEDURE — 94660 CPAP INITIATION&MGMT: CPT

## 2025-05-18 PROCEDURE — 2500000001 HC RX 250 WO HCPCS SELF ADMINISTERED DRUGS (ALT 637 FOR MEDICARE OP)

## 2025-05-18 PROCEDURE — 87637 SARSCOV2&INF A&B&RSV AMP PRB: CPT

## 2025-05-18 PROCEDURE — 94640 AIRWAY INHALATION TREATMENT: CPT

## 2025-05-18 PROCEDURE — 99285 EMERGENCY DEPT VISIT HI MDM: CPT | Performed by: STUDENT IN AN ORGANIZED HEALTH CARE EDUCATION/TRAINING PROGRAM

## 2025-05-18 PROCEDURE — 2500000004 HC RX 250 GENERAL PHARMACY W/ HCPCS (ALT 636 FOR OP/ED): Mod: JZ

## 2025-05-18 PROCEDURE — 2500000002 HC RX 250 W HCPCS SELF ADMINISTERED DRUGS (ALT 637 FOR MEDICARE OP, ALT 636 FOR OP/ED)

## 2025-05-18 RX ORDER — SENNOSIDES 8.6 MG/1
2 TABLET ORAL 2 TIMES DAILY
Status: DISCONTINUED | OUTPATIENT
Start: 2025-05-18 | End: 2025-05-19 | Stop reason: HOSPADM

## 2025-05-18 RX ORDER — ADENOSINE 3 MG/ML
6 INJECTION, SOLUTION INTRAVENOUS ONCE
Status: COMPLETED | OUTPATIENT
Start: 2025-05-18 | End: 2025-05-18

## 2025-05-18 RX ORDER — ATORVASTATIN CALCIUM 40 MG/1
40 TABLET, FILM COATED ORAL NIGHTLY
Status: DISCONTINUED | OUTPATIENT
Start: 2025-05-19 | End: 2025-05-19 | Stop reason: HOSPADM

## 2025-05-18 RX ORDER — SPIRONOLACTONE 25 MG/1
25 TABLET ORAL DAILY
Status: DISCONTINUED | OUTPATIENT
Start: 2025-05-19 | End: 2025-05-18

## 2025-05-18 RX ORDER — ATORVASTATIN CALCIUM 20 MG/1
20 TABLET, FILM COATED ORAL NIGHTLY
Status: DISCONTINUED | OUTPATIENT
Start: 2025-05-18 | End: 2025-05-18

## 2025-05-18 RX ORDER — FUROSEMIDE 40 MG/1
40 TABLET ORAL DAILY
Status: DISCONTINUED | OUTPATIENT
Start: 2025-05-18 | End: 2025-05-19 | Stop reason: HOSPADM

## 2025-05-18 RX ORDER — ADENOSINE 3 MG/ML
INJECTION, SOLUTION INTRAVENOUS
Status: DISPENSED
Start: 2025-05-18 | End: 2025-05-19

## 2025-05-18 RX ORDER — POLYETHYLENE GLYCOL 3350 17 G/17G
17 POWDER, FOR SOLUTION ORAL 2 TIMES DAILY
Status: DISCONTINUED | OUTPATIENT
Start: 2025-05-18 | End: 2025-05-19 | Stop reason: HOSPADM

## 2025-05-18 RX ORDER — ADENOSINE 3 MG/ML
12 INJECTION, SOLUTION INTRAVENOUS ONCE
Status: COMPLETED | OUTPATIENT
Start: 2025-05-18 | End: 2025-05-18

## 2025-05-18 RX ORDER — ACETAMINOPHEN 650 MG/1
650 SUPPOSITORY RECTAL EVERY 4 HOURS PRN
Status: DISCONTINUED | OUTPATIENT
Start: 2025-05-18 | End: 2025-05-19 | Stop reason: HOSPADM

## 2025-05-18 RX ORDER — ACETAMINOPHEN 160 MG/5ML
650 SOLUTION ORAL EVERY 4 HOURS PRN
Status: DISCONTINUED | OUTPATIENT
Start: 2025-05-18 | End: 2025-05-19 | Stop reason: HOSPADM

## 2025-05-18 RX ORDER — KETOCONAZOLE 20 MG/ML
1 SHAMPOO, SUSPENSION TOPICAL 2 TIMES WEEKLY
COMMUNITY

## 2025-05-18 RX ORDER — GUAIFENESIN/DEXTROMETHORPHAN 100-10MG/5
5 SYRUP ORAL EVERY 4 HOURS PRN
Status: DISCONTINUED | OUTPATIENT
Start: 2025-05-18 | End: 2025-05-19 | Stop reason: HOSPADM

## 2025-05-18 RX ORDER — ONDANSETRON 4 MG/1
4 TABLET, FILM COATED ORAL EVERY 8 HOURS PRN
Status: DISCONTINUED | OUTPATIENT
Start: 2025-05-18 | End: 2025-05-19 | Stop reason: HOSPADM

## 2025-05-18 RX ORDER — IPRATROPIUM BROMIDE AND ALBUTEROL SULFATE 2.5; .5 MG/3ML; MG/3ML
3 SOLUTION RESPIRATORY (INHALATION) EVERY 4 HOURS PRN
Status: DISCONTINUED | OUTPATIENT
Start: 2025-05-18 | End: 2025-05-19 | Stop reason: HOSPADM

## 2025-05-18 RX ORDER — IPRATROPIUM BROMIDE AND ALBUTEROL SULFATE 2.5; .5 MG/3ML; MG/3ML
3 SOLUTION RESPIRATORY (INHALATION) EVERY 20 MIN
Status: COMPLETED | OUTPATIENT
Start: 2025-05-18 | End: 2025-05-18

## 2025-05-18 RX ORDER — TALC
3 POWDER (GRAM) TOPICAL NIGHTLY
Status: DISCONTINUED | OUTPATIENT
Start: 2025-05-18 | End: 2025-05-19 | Stop reason: HOSPADM

## 2025-05-18 RX ORDER — ACETAMINOPHEN 325 MG/1
650 TABLET ORAL EVERY 4 HOURS PRN
Status: DISCONTINUED | OUTPATIENT
Start: 2025-05-18 | End: 2025-05-19 | Stop reason: HOSPADM

## 2025-05-18 RX ORDER — FLUCONAZOLE 150 MG/1
150 TABLET ORAL DAILY
COMMUNITY
Start: 2025-05-14 | End: 2025-05-24

## 2025-05-18 RX ORDER — ONDANSETRON HYDROCHLORIDE 2 MG/ML
4 INJECTION, SOLUTION INTRAVENOUS EVERY 8 HOURS PRN
Status: DISCONTINUED | OUTPATIENT
Start: 2025-05-18 | End: 2025-05-19 | Stop reason: HOSPADM

## 2025-05-18 RX ORDER — MAGNESIUM SULFATE HEPTAHYDRATE 40 MG/ML
2 INJECTION, SOLUTION INTRAVENOUS ONCE
Status: COMPLETED | OUTPATIENT
Start: 2025-05-18 | End: 2025-05-18

## 2025-05-18 RX ORDER — LEVOTHYROXINE SODIUM 50 UG/1
50 TABLET ORAL
Status: DISCONTINUED | OUTPATIENT
Start: 2025-05-19 | End: 2025-05-19 | Stop reason: HOSPADM

## 2025-05-18 RX ORDER — SPIRONOLACTONE 25 MG/1
25 TABLET ORAL DAILY
Status: DISCONTINUED | OUTPATIENT
Start: 2025-05-19 | End: 2025-05-19 | Stop reason: HOSPADM

## 2025-05-18 RX ORDER — PREDNISONE 20 MG/1
40 TABLET ORAL DAILY
Status: DISCONTINUED | OUTPATIENT
Start: 2025-05-19 | End: 2025-05-19 | Stop reason: HOSPADM

## 2025-05-18 RX ORDER — METOPROLOL SUCCINATE 50 MG/1
50 TABLET, EXTENDED RELEASE ORAL DAILY
Status: DISCONTINUED | OUTPATIENT
Start: 2025-05-18 | End: 2025-05-19 | Stop reason: HOSPADM

## 2025-05-18 RX ORDER — FLUCONAZOLE 150 MG/1
150 TABLET ORAL DAILY
Status: DISCONTINUED | OUTPATIENT
Start: 2025-05-18 | End: 2025-05-19 | Stop reason: HOSPADM

## 2025-05-18 RX ADMIN — METOPROLOL SUCCINATE 50 MG: 50 TABLET, EXTENDED RELEASE ORAL at 21:08

## 2025-05-18 RX ADMIN — FUROSEMIDE 40 MG: 40 TABLET ORAL at 18:05

## 2025-05-18 RX ADMIN — Medication 2 L/MIN: at 14:39

## 2025-05-18 RX ADMIN — SENNOSIDES 17.2 MG: 8.6 TABLET, FILM COATED ORAL at 21:08

## 2025-05-18 RX ADMIN — IPRATROPIUM BROMIDE AND ALBUTEROL SULFATE 3 ML: 2.5; .5 SOLUTION RESPIRATORY (INHALATION) at 15:10

## 2025-05-18 RX ADMIN — Medication 3 MG: at 21:08

## 2025-05-18 RX ADMIN — IPRATROPIUM BROMIDE AND ALBUTEROL SULFATE 3 ML: 2.5; .5 SOLUTION RESPIRATORY (INHALATION) at 15:20

## 2025-05-18 RX ADMIN — METHYLPREDNISOLONE SODIUM SUCCINATE 125 MG: 125 INJECTION, POWDER, FOR SOLUTION INTRAMUSCULAR; INTRAVENOUS at 15:23

## 2025-05-18 RX ADMIN — ADENOSINE 12 MG: 3 INJECTION INTRAVENOUS at 16:05

## 2025-05-18 RX ADMIN — ADENOSINE 6 MG: 3 INJECTION INTRAVENOUS at 16:04

## 2025-05-18 RX ADMIN — IPRATROPIUM BROMIDE AND ALBUTEROL SULFATE 3 ML: 2.5; .5 SOLUTION RESPIRATORY (INHALATION) at 15:40

## 2025-05-18 RX ADMIN — Medication 2 L/MIN: at 15:10

## 2025-05-18 RX ADMIN — FLUCONAZOLE 150 MG: 150 TABLET ORAL at 23:19

## 2025-05-18 RX ADMIN — RIVAROXABAN 20 MG: 20 TABLET, FILM COATED ORAL at 18:05

## 2025-05-18 RX ADMIN — POLYETHYLENE GLYCOL 3350 17 G: 17 POWDER, FOR SOLUTION ORAL at 21:08

## 2025-05-18 RX ADMIN — MAGNESIUM SULFATE HEPTAHYDRATE 2 G: 40 INJECTION, SOLUTION INTRAVENOUS at 15:23

## 2025-05-18 SDOH — ECONOMIC STABILITY: FOOD INSECURITY: WITHIN THE PAST 12 MONTHS, THE FOOD YOU BOUGHT JUST DIDN'T LAST AND YOU DIDN'T HAVE MONEY TO GET MORE.: NEVER TRUE

## 2025-05-18 SDOH — SOCIAL STABILITY: SOCIAL NETWORK: HOW OFTEN DO YOU ATTEND MEETINGS OF THE CLUBS OR ORGANIZATIONS YOU BELONG TO?: MORE THAN 4 TIMES PER YEAR

## 2025-05-18 SDOH — ECONOMIC STABILITY: INCOME INSECURITY: IN THE PAST 12 MONTHS HAS THE ELECTRIC, GAS, OIL, OR WATER COMPANY THREATENED TO SHUT OFF SERVICES IN YOUR HOME?: NO

## 2025-05-18 SDOH — SOCIAL STABILITY: SOCIAL INSECURITY: HAVE YOU HAD THOUGHTS OF HARMING ANYONE ELSE?: NO

## 2025-05-18 SDOH — HEALTH STABILITY: PHYSICAL HEALTH: ON AVERAGE, HOW MANY MINUTES DO YOU ENGAGE IN EXERCISE AT THIS LEVEL?: 0 MIN

## 2025-05-18 SDOH — ECONOMIC STABILITY: FOOD INSECURITY: HOW HARD IS IT FOR YOU TO PAY FOR THE VERY BASICS LIKE FOOD, HOUSING, MEDICAL CARE, AND HEATING?: NOT HARD AT ALL

## 2025-05-18 SDOH — SOCIAL STABILITY: SOCIAL INSECURITY
WITHIN THE LAST YEAR, HAVE YOU BEEN RAPED OR FORCED TO HAVE ANY KIND OF SEXUAL ACTIVITY BY YOUR PARTNER OR EX-PARTNER?: NO

## 2025-05-18 SDOH — SOCIAL STABILITY: SOCIAL INSECURITY: WITHIN THE LAST YEAR, HAVE YOU BEEN AFRAID OF YOUR PARTNER OR EX-PARTNER?: NO

## 2025-05-18 SDOH — SOCIAL STABILITY: SOCIAL INSECURITY: ARE YOU MARRIED, WIDOWED, DIVORCED, SEPARATED, NEVER MARRIED, OR LIVING WITH A PARTNER?: NEVER MARRIED

## 2025-05-18 SDOH — SOCIAL STABILITY: SOCIAL INSECURITY: WERE YOU ABLE TO COMPLETE ALL THE BEHAVIORAL HEALTH SCREENINGS?: YES

## 2025-05-18 SDOH — HEALTH STABILITY: MENTAL HEALTH
DO YOU FEEL STRESS - TENSE, RESTLESS, NERVOUS, OR ANXIOUS, OR UNABLE TO SLEEP AT NIGHT BECAUSE YOUR MIND IS TROUBLED ALL THE TIME - THESE DAYS?: TO SOME EXTENT

## 2025-05-18 SDOH — SOCIAL STABILITY: SOCIAL NETWORK
DO YOU BELONG TO ANY CLUBS OR ORGANIZATIONS SUCH AS CHURCH GROUPS, UNIONS, FRATERNAL OR ATHLETIC GROUPS, OR SCHOOL GROUPS?: YES

## 2025-05-18 SDOH — SOCIAL STABILITY: SOCIAL NETWORK: HOW OFTEN DO YOU GET TOGETHER WITH FRIENDS OR RELATIVES?: ONCE A WEEK

## 2025-05-18 SDOH — SOCIAL STABILITY: SOCIAL NETWORK: IN A TYPICAL WEEK, HOW MANY TIMES DO YOU TALK ON THE PHONE WITH FAMILY, FRIENDS, OR NEIGHBORS?: THREE TIMES A WEEK

## 2025-05-18 SDOH — SOCIAL STABILITY: SOCIAL INSECURITY
WITHIN THE LAST YEAR, HAVE YOU BEEN KICKED, HIT, SLAPPED, OR OTHERWISE PHYSICALLY HURT BY YOUR PARTNER OR EX-PARTNER?: NO

## 2025-05-18 SDOH — SOCIAL STABILITY: SOCIAL INSECURITY: WITHIN THE LAST YEAR, HAVE YOU BEEN HUMILIATED OR EMOTIONALLY ABUSED IN OTHER WAYS BY YOUR PARTNER OR EX-PARTNER?: NO

## 2025-05-18 SDOH — ECONOMIC STABILITY: FOOD INSECURITY: WITHIN THE PAST 12 MONTHS, YOU WORRIED THAT YOUR FOOD WOULD RUN OUT BEFORE YOU GOT THE MONEY TO BUY MORE.: NEVER TRUE

## 2025-05-18 SDOH — SOCIAL STABILITY: SOCIAL INSECURITY: HAVE YOU HAD ANY THOUGHTS OF HARMING ANYONE ELSE?: NO

## 2025-05-18 SDOH — HEALTH STABILITY: PHYSICAL HEALTH
HOW OFTEN DO YOU NEED TO HAVE SOMEONE HELP YOU WHEN YOU READ INSTRUCTIONS, PAMPHLETS, OR OTHER WRITTEN MATERIAL FROM YOUR DOCTOR OR PHARMACY?: RARELY

## 2025-05-18 SDOH — SOCIAL STABILITY: SOCIAL INSECURITY: DOES ANYONE TRY TO KEEP YOU FROM HAVING/CONTACTING OTHER FRIENDS OR DOING THINGS OUTSIDE YOUR HOME?: NO

## 2025-05-18 SDOH — HEALTH STABILITY: PHYSICAL HEALTH: ON AVERAGE, HOW MANY DAYS PER WEEK DO YOU ENGAGE IN MODERATE TO STRENUOUS EXERCISE (LIKE A BRISK WALK)?: 0 DAYS

## 2025-05-18 SDOH — SOCIAL STABILITY: SOCIAL INSECURITY: HAS ANYONE EVER THREATENED TO HURT YOUR FAMILY OR YOUR PETS?: NO

## 2025-05-18 SDOH — SOCIAL STABILITY: SOCIAL INSECURITY: DO YOU FEEL UNSAFE GOING BACK TO THE PLACE WHERE YOU ARE LIVING?: NO

## 2025-05-18 SDOH — SOCIAL STABILITY: SOCIAL INSECURITY: ABUSE: ADULT

## 2025-05-18 SDOH — SOCIAL STABILITY: SOCIAL INSECURITY: ARE YOU OR HAVE YOU BEEN THREATENED OR ABUSED PHYSICALLY, EMOTIONALLY, OR SEXUALLY BY ANYONE?: NO

## 2025-05-18 SDOH — SOCIAL STABILITY: SOCIAL INSECURITY: ARE THERE ANY APPARENT SIGNS OF INJURIES/BEHAVIORS THAT COULD BE RELATED TO ABUSE/NEGLECT?: NO

## 2025-05-18 SDOH — SOCIAL STABILITY: SOCIAL NETWORK: HOW OFTEN DO YOU ATTEND CHURCH OR RELIGIOUS SERVICES?: NEVER

## 2025-05-18 SDOH — SOCIAL STABILITY: SOCIAL INSECURITY: DO YOU FEEL ANYONE HAS EXPLOITED OR TAKEN ADVANTAGE OF YOU FINANCIALLY OR OF YOUR PERSONAL PROPERTY?: NO

## 2025-05-18 ASSESSMENT — ACTIVITIES OF DAILY LIVING (ADL)
ASSISTIVE_DEVICE: WALKER;OXYGEN
ADEQUATE_TO_COMPLETE_ADL: YES
LACK_OF_TRANSPORTATION: NO
JUDGMENT_ADEQUATE_SAFELY_COMPLETE_DAILY_ACTIVITIES: YES
HEARING - RIGHT EAR: FUNCTIONAL
TOILETING: NEEDS ASSISTANCE
PATIENT'S MEMORY ADEQUATE TO SAFELY COMPLETE DAILY ACTIVITIES?: YES
BATHING: NEEDS ASSISTANCE
FEEDING YOURSELF: INDEPENDENT
GROOMING: INDEPENDENT
WALKS IN HOME: NEEDS ASSISTANCE
HEARING - LEFT EAR: FUNCTIONAL
DRESSING YOURSELF: NEEDS ASSISTANCE

## 2025-05-18 ASSESSMENT — COGNITIVE AND FUNCTIONAL STATUS - GENERAL
PATIENT BASELINE BEDBOUND: NO
WALKING IN HOSPITAL ROOM: A LITTLE
TOILETING: A LITTLE
DAILY ACTIVITIY SCORE: 23
MOVING TO AND FROM BED TO CHAIR: A LITTLE
MOBILITY SCORE: 22

## 2025-05-18 ASSESSMENT — PAIN DESCRIPTION - LOCATION
LOCATION: SHOULDER
LOCATION_2: JAW

## 2025-05-18 ASSESSMENT — LIFESTYLE VARIABLES
AUDIT-C TOTAL SCORE: 0
EVER HAD A DRINK FIRST THING IN THE MORNING TO STEADY YOUR NERVES TO GET RID OF A HANGOVER: NO
SKIP TO QUESTIONS 9-10: 1
EVER FELT BAD OR GUILTY ABOUT YOUR DRINKING: NO
HOW OFTEN DO YOU HAVE 6 OR MORE DRINKS ON ONE OCCASION: NEVER
PRESCIPTION_ABUSE_PAST_12_MONTHS: NO
HAVE YOU EVER FELT YOU SHOULD CUT DOWN ON YOUR DRINKING: NO
HOW OFTEN DO YOU HAVE A DRINK CONTAINING ALCOHOL: NEVER
TOTAL SCORE: 0
AUDIT-C TOTAL SCORE: 0
HAVE PEOPLE ANNOYED YOU BY CRITICIZING YOUR DRINKING: NO
SUBSTANCE_ABUSE_PAST_12_MONTHS: NO
HOW MANY STANDARD DRINKS CONTAINING ALCOHOL DO YOU HAVE ON A TYPICAL DAY: PATIENT DOES NOT DRINK

## 2025-05-18 ASSESSMENT — PAIN SCALES - GENERAL
PAINLEVEL_OUTOF10: 0 - NO PAIN
PAINLEVEL_OUTOF10: 2
PAINLEVEL_OUTOF10: 3

## 2025-05-18 ASSESSMENT — PAIN - FUNCTIONAL ASSESSMENT
PAIN_FUNCTIONAL_ASSESSMENT: 0-10
PAIN_FUNCTIONAL_ASSESSMENT: 0-10

## 2025-05-18 ASSESSMENT — PATIENT HEALTH QUESTIONNAIRE - PHQ9
1. LITTLE INTEREST OR PLEASURE IN DOING THINGS: SEVERAL DAYS
SUM OF ALL RESPONSES TO PHQ9 QUESTIONS 1 & 2: 2
2. FEELING DOWN, DEPRESSED OR HOPELESS: SEVERAL DAYS

## 2025-05-18 ASSESSMENT — ENCOUNTER SYMPTOMS
DIARRHEA: 0
NUMBNESS: 0
PALPITATIONS: 1
DYSURIA: 0
COUGH: 1
DIFFICULTY URINATING: 0
VOMITING: 0
ABDOMINAL PAIN: 0
FREQUENCY: 1
SHORTNESS OF BREATH: 1
SEIZURES: 0
CONSTIPATION: 0
CONFUSION: 0
FEVER: 0
DIZZINESS: 0
WHEEZING: 1
NAUSEA: 0

## 2025-05-18 ASSESSMENT — PAIN DESCRIPTION - PROGRESSION: CLINICAL_PROGRESSION: NOT CHANGED

## 2025-05-18 ASSESSMENT — COLUMBIA-SUICIDE SEVERITY RATING SCALE - C-SSRS
1. IN THE PAST MONTH, HAVE YOU WISHED YOU WERE DEAD OR WISHED YOU COULD GO TO SLEEP AND NOT WAKE UP?: NO
6. HAVE YOU EVER DONE ANYTHING, STARTED TO DO ANYTHING, OR PREPARED TO DO ANYTHING TO END YOUR LIFE?: NO
2. HAVE YOU ACTUALLY HAD ANY THOUGHTS OF KILLING YOURSELF?: NO

## 2025-05-18 ASSESSMENT — PAIN DESCRIPTION - FREQUENCY: FREQUENCY: CONSTANT/CONTINUOUS

## 2025-05-18 ASSESSMENT — PAIN DESCRIPTION - PAIN TYPE: TYPE: ACUTE PAIN

## 2025-05-18 ASSESSMENT — PAIN DESCRIPTION - DESCRIPTORS
DESCRIPTORS: TIGHTNESS
DESCRIPTORS_2: ACHING

## 2025-05-18 ASSESSMENT — PAIN DESCRIPTION - ORIENTATION
ORIENTATION_2: LEFT
ORIENTATION: MID

## 2025-05-18 ASSESSMENT — PAIN DESCRIPTION - ONSET: ONSET: ONGOING

## 2025-05-18 NOTE — ED PROVIDER NOTES
EMERGENCY DEPARTMENT ENCOUNTER      Pt Name: Helen Johnston  MRN: [id number]  Birthdate 1950  Date of evaluation: 5/18/2025  Provider: Boy Coronado MD    CHIEF COMPLAINT       Chief Complaint   Patient presents with    Cough     Since Thursday night. Hx of COPD. Also endorsing sore throat, shoulder blade and left sided jaw pain     HISTORY OF PRESENT ILLNESS    HPI  74-year-old female presents emergency department chief complaint of a cough.  Patient claims that since Thursday she has had a cough it has been getting progressively worse.  The patient has a past medical history significant for COPD A-fib and asthma.  She describes her chest pressure as tightness worse with exertion has been on and off since Thursday.  Patient claims that she tried to take her albuterol at home however it did not help her.  Cardiology was consulted regarding her EKG findings.  Nursing Notes were reviewed.    PAST MEDICAL HISTORY   Medical History[1]      SURGICAL HISTORY     Surgical History[2]      CURRENT MEDICATIONS       Previous Medications    ALBUTEROL 90 MCG/ACTUATION INHALER    Inhale 2 puffs every 4 hours if needed for wheezing or shortness of breath (cough).    ATORVASTATIN (LIPITOR) 40 MG TABLET    Take 1 tablet (40 mg) by mouth once daily.    BACITRACIN OPHTHALMIC OINTMENT    APPLY 1/4 INCH TO BOTH EYES AT BEDTIME    BUDESONIDE-GLYCOPYR-FORMOTEROL (BREZTRI AEROSPHERE) 160-9-4.8 MCG/ACTUATION HFA AEROSOL INHALER    Inhale 2 puffs 2 times a day.    CARBOXYMETHYLCELLULOSE-GLYCERN (REFRESH OPTIVE) 0.5-0.9 % DROPS    Administer 1 drop into affected eye(s) every 12 hours.    CETIRIZINE (ZYRTEC) 10 MG CAPSULE    Take by mouth.    CHOLECALCIFEROL (VITAMIN D-3) 50,000 UNIT CAPSULE    Take 1 capsule (50,000 Units) by mouth once a week.    CLOBETASOL (OLUX) 0.05 % TOPICAL FOAM    Apply topically 2 times a day as needed (scalp psoriasis).    COMPRESSION SOCKS, LARGE MISC    2 each once daily.    CYCLOSPORINE 0.05 % DROPS     Administer 1 drop into affected eye(s) every 12 hours.    FLUOCINOLONE (DERMOTIC) 0.01 % EAR DROPS    Administer 5 drops into each ear 2 times a day.    HYDROCORTISONE-IODOQUINOL (DERMAZENE) 1-1 % CREAM IN PACKET    Apply topically.    IPRATROPIUM-ALBUTEROL (DUO-NEB) 0.5-2.5 MG/3 ML NEBULIZER SOLUTION    Take 3 mL by nebulization every 4 hours if needed for wheezing or shortness of breath.    LEVOTHYROXINE (SYNTHROID, LEVOXYL) 50 MCG TABLET    Take 1 tablet (50 mcg) by mouth once daily.    LIFITEGRAST 5 % DROPPERETTE    Administer 1 drop into affected eye(s) every 12 hours.    LOTEPREDNOL (LOTEMAX) 0.5 % OPHTHALMIC SUSPENSION    1 drop.    METOPROLOL SUCCINATE XL (TOPROL-XL) 50 MG 24 HR TABLET    Take 1 tablet (50 mg) by mouth once daily at bedtime.    NEOMYCIN-BACITRACIN-POLYMYXIN-HYDROCORTISONE (CORTISPORIN) 3.5-400-10,000 MG-UNIT/G-1% OPHTHALMIC OINTMENT    Apply to affected eye(s).    NYSTATIN (MYCOSTATIN) 100,000 UNIT/GRAM POWDER    Apply topically. Apply to affected areas 2-3 times daily    OLOPATADINE (PATANOL) 0.1 % OPHTHALMIC SOLUTION    Administer into affected eye(s) every 12 hours.    PANTOPRAZOLE (PROTONIX) 40 MG EC TABLET    Take 1 tablet (40 mg) by mouth once daily in the morning. Take before meals.    -PROPYLENE GLYCOL (SYSTANE, PROPYLENE GLYCOL,) 0.4-0.3 % DROPS OPHTHALMIC DROPS    1 each.    PREDNISOLONE ACETATE (PRED-FORTE) 1 % OPHTHALMIC SUSPENSION    Administer into affected eye(s) 2 times a day as needed.    RESTASIS 0.05 % OPHTHALMIC EMULSION    Administer 1 drop into both eyes 2 times a day.    RIVAROXABAN (XARELTO) 20 MG TABLET    Take 1 tablet (20 mg) by mouth once daily in the evening. Take with meals.    SPIRONOLACTONE (ALDACTONE) 25 MG TABLET    Take 1 tablet (25 mg) by mouth once daily.    TIRZEPATIDE, WEIGHT LOSS, (ZEPBOUND) 2.5 MG/0.5 ML INJECTION    Inject 2.5 mg under the skin every 7 days.    TOBRAMYCIN-DEXAMETHASONE (TOBRADEX) OPHTHALMIC OINTMENT    Place into both eyes  nightly Apply to both eyes nightly.    TRIAMCINOLONE (KENALOG) 0.1 % OINTMENT    Apply topically.    UREA (CARMOL) 40 % CREAM    Apply topically.    XDEMVY 0.25 % DROPS    INSTILL 1 DROP INTO BOTH EYES TWICE DAILY FOR 6 WEEKS       ALLERGIES     Lisinopril, Thiopental, Vancomycin, Cefuroxime, Erythromycin, Linezolid, Procainamide, Procaine, and Silver sulfadiazine    FAMILY HISTORY     Family History[3]       SOCIAL HISTORY     Social History[4]    SCREENINGS                        PHYSICAL EXAM    (up to 7 for level 4, 8 or more for level 5)     ED Triage Vitals [05/18/25 1343]   Temperature Heart Rate Respirations BP   36.4 °C (97.6 °F) (!) 130 20 122/74      Pulse Ox Temp Source Heart Rate Source Patient Position   (!) 90 % Temporal Monitor Sitting      BP Location FiO2 (%)     Right arm --       Physical Exam  Constitutional:       Appearance: Normal appearance.   HENT:      Head: Normocephalic.      Nose: Nose normal.      Mouth/Throat:      Mouth: Mucous membranes are moist.      Pharynx: Oropharynx is clear.   Eyes:      Extraocular Movements: Extraocular movements intact.      Pupils: Pupils are equal, round, and reactive to light.   Cardiovascular:      Rate and Rhythm: Tachycardia present. Rhythm irregular.   Pulmonary:      Effort: Pulmonary effort is normal.      Breath sounds: Wheezing present.      Comments: Decreased breath sounds throughout.  Abdominal:      General: Abdomen is flat. Bowel sounds are normal.   Musculoskeletal:         General: Normal range of motion.   Skin:     General: Skin is warm.      Capillary Refill: Capillary refill takes less than 2 seconds.   Neurological:      General: No focal deficit present.      Mental Status: She is alert.   Psychiatric:         Mood and Affect: Mood normal.          DIAGNOSTIC RESULTS     LABS:  Labs Reviewed   CBC WITH AUTO DIFFERENTIAL - Abnormal       Result Value    WBC 4.4      nRBC 0.0      RBC 5.08      Hemoglobin 14.5      Hematocrit 46.1 (*)      MCV 91      MCH 28.5      MCHC 31.5 (*)     RDW 13.5      Platelets 152      Neutrophils % 72.6      Immature Granulocytes %, Automated 0.2      Lymphocytes % 13.0      Monocytes % 13.2      Eosinophils % 0.5      Basophils % 0.5      Neutrophils Absolute 3.18      Immature Granulocytes Absolute, Automated 0.01      Lymphocytes Absolute 0.57 (*)     Monocytes Absolute 0.58      Eosinophils Absolute 0.02      Basophils Absolute 0.02     COMPREHENSIVE METABOLIC PANEL - Abnormal    Glucose 114 (*)     Sodium 136      Potassium 4.5      Chloride 98      Bicarbonate 31      Anion Gap 12      Urea Nitrogen 15      Creatinine 0.90      eGFR 68      Calcium 10.3      Albumin 4.0      Alkaline Phosphatase 85      Total Protein 7.9      AST 23      Bilirubin, Total 0.8      ALT 10     SERIAL TROPONIN-INITIAL - Abnormal    Troponin I, High Sensitivity 21 (*)     Narrative:     Less than 99th percentile of normal range cutoff-  Female and children under 18 years old <14 ng/L; Male <21 ng/L: Negative  Repeat testing should be performed if clinically indicated.     Female and children under 18 years old 14-50 ng/L; Male 21-50 ng/L:  Consistent with possible cardiac damage and possible increased clinical   risk. Serial measurements may help to assess extent of myocardial damage.     >50 ng/L: Consistent with cardiac damage, increased clinical risk and  myocardial infarction. Serial measurements may help assess extent of   myocardial damage.      NOTE: Children less than 1 year old may have higher baseline troponin   levels and results should be interpreted in conjunction with the overall   clinical context.     NOTE: Troponin I testing is performed using a different   testing methodology at [facility] than at other   [facility]. Direct result comparisons should only   be made within the same method.   B-TYPE NATRIURETIC PEPTIDE - Normal    BNP 70      Narrative:        <100 pg/mL - Heart failure  unlikely  100-299 pg/mL - Intermediate probability of acute heart                  failure exacerbation. Correlate with clinical                  context and patient history.    >=300 pg/mL - Heart Failure likely. Correlate with clinical                  context and patient history.    BNP testing is performed using different testing methodology at [facility] than at other [facility]. Direct result comparisons should only be made within the same method.      TROPONIN SERIES- (INITIAL, 1 HR)    Narrative:     The following orders were created for panel order Troponin I Series, High Sensitivity (0, 1 HR).  Procedure                               Abnormality         Status                     ---------                               -----------         ------                     Troponin I, High Sensiti...[[id number]]  Abnormal            Final result               Troponin, High Sensitivi...[[id number]]                      In process                   Please view results for these tests on the individual orders.   SERIAL TROPONIN, 1 HOUR       All other labs were within normal range or not returned as of this dictation.    Imaging  XR chest 1 view   Final Result   Enlarged cardiac silhouette with pulmonary vascular congestion and   mild edema .  Patchy bibasilar airspace disease likely component of   the edema versus atelectatic changes             MACRO:   None        Signed by: Dre Pineod 5/18/2025 2:52 PM   Dictation workstation:   [id number]           Procedures  Critical Care    Performed by: Boy Coronado MD  Authorized by: Raffaele Sandoval DO    Critical care provider statement:     Critical care time was exclusive of:  Separately billable procedures and treating other patients and teaching time    Critical care was necessary to treat or prevent imminent or life-threatening deterioration of the following conditions: Acute tachydysrhythmia.    Critical care was time spent personally by  me on the following activities:  Blood draw for specimens, discussions with consultants, discussions with primary provider, examination of patient, obtaining history from patient or surrogate, ordering and performing treatments and interventions, ordering and review of laboratory studies, ordering and review of radiographic studies, pulse oximetry, re-evaluation of patient's condition and review of old charts    Care discussed with: admitting provider         EMERGENCY DEPARTMENT COURSE/MDM:   Medical Decision Making  74-year-old female presents emergency department chief complaint of a cough.  Medical management.  Emergency Department consist of ACS rule out as well as  Treatments for COPD exacerbation.  Patient found to be in narrow complex tachydysrhythmia but hemodynamically stable.  Unclear exact rhythm based on initial EKG and therefore rhythm sent to cardiologist Dr. Travis.  Normally follows with Dr. Rojas for EP with prior history of ablation.  Determined to have an ectopic atrial rhythm by cardiology team.  They will speak to EP for further recommendations.    Patient was found to be in a tachycardic rhythm.  We administered adenosine however she is persistently in a tachycardic rhythm.  Cardiologist Dr. Travis was consulted he came down evaluated the patient.  Her cardiologist Dr. Belcher was contacted and he came down to evaluate the patient as well.  Patient's labs remarkable for mildly elevated troponin CBC and CMP are unremarkable.  Chest x-ray does show    -IMPRESSION:  Enlarged cardiac silhouette with pulmonary vascular congestion and  mild edema .  Patchy bibasilar airspace disease likely component of  the edema versus atelectatic changes.    Patient was also given magnesium.  She will be admitted to the hospital service for continued observation and management for COPD/exacerbation she will also be evaluated by cardiology for new tachydysrhythmia.  Mild elevation of troponins likely related to  type II NSTEMI.        ED Course as of 05/20/25 1856   Sun May 18, 2025   1508 Mild chronic troponin elevation. Will trend delta repeat. [TL]   1601 Patient verbally consented for chemical cardioversion with adenosine.  Dr. Travis of the cardiology service at bedside.  Interpreted rhythm strip which is from his perspective consistent with atrial tachycardia.  He will discuss patient's case with the patient's known cardiologist Dr. Belcher for further recommendations. [TL]   1605 Call out for admission [TL]      ED Course User Index  [TL] Raffaele Sandoval DO         Diagnoses as of 05/20/25 1856   Chronic obstructive pulmonary disease, unspecified COPD type (Multi)   Acute cough        Patient and or family in agreement and understanding of treatment plan.  All questions answered.      I reviewed the case with the attending ED physician. The attending ED physician agrees with the plan. Patient and/or patient´s representative was counseled regarding labs, imaging, likely diagnosis, and plan. All questions were answered.    ED Medications administered this visit:    Medications   oxygen (O2) therapy (2 L/min inhalation Rate Verify Medical Gas 5/18/25 1510)   adenosine (Adenocard) injection 3 mg/mL  - Omnicell Override Pull (has no administration in time range)   ipratropium-albuteroL (Duo-Neb) 0.5-2.5 mg/3 mL nebulizer solution 3 mL (3 mL nebulization Given 5/18/25 1540)   methylPREDNISolone sod succinate (SOLU-Medrol) injection 125 mg (125 mg intravenous Given 5/18/25 1523)   magnesium sulfate 2 g in sterile water for injection 50 mL (0 g intravenous Stopped 5/18/25 1608)   adenosine (Adenocard) injection 6 mg (6 mg intravenous Given 5/18/25 1604)   adenosine (Adenocard) injection 12 mg (12 mg intravenous Given 5/18/25 1605)       New Prescriptions from this visit:    New Prescriptions    No medications on file       Follow-up:  No follow-up provider specified.      Final Impression: No diagnosis found.      (Please  note that portions of this note were completed with a voice recognition program.  Efforts were made to edit the dictations but occasionally words are mis-transcribed.)       Boy Coronado MD  Resident  05/18/25 4855    I performed a history and physical examination of the patient and discussed his management with the resident physician.  I agree with the history, physical, assessment, and plan of care, with the following exceptions:   None    I was present for key and critical portions of the following procedures: Critical care  Time Spent in Critical Care of the patient: 35  Time spent in discussions with the patient and family: 40    Raffaele Sandoval DO       [1]   Past Medical History:  Diagnosis Date    COPD (chronic obstructive pulmonary disease) (Multi)     Diabetes mellitus (Multi)    [2] History reviewed. No pertinent surgical history.  [3] No family history on file.  [4]   Social History  Socioeconomic History    Marital status: Single   Tobacco Use    Smoking status: Never    Smokeless tobacco: Never   Substance and Sexual Activity    Alcohol use: Never     Comment: occassional    Drug use: Never     Social Drivers of Health     Financial Resource Strain: Low Risk  (5/28/2024)    Received from [facility]    Overall Financial Resource Strain (CARDIA)     Difficulty of Paying Living Expenses: Not very hard   Food Insecurity: No Food Insecurity (5/28/2024)    Received from [facility]    Hunger Vital Sign     Worried About Running Out of Food in the Last Year: Never true     Ran Out of Food in the Last Year: Never true   Transportation Needs: No Transportation Needs (5/28/2024)    Received from [facility]    PRAPARE - Transportation     Lack of Transportation (Medical): No     Lack of Transportation (Non-Medical): No   Physical Activity: Unknown (5/28/2024)    Received from [facility]    Exercise Vital Sign     Days of Exercise per Week: 0 days   Stress: No Stress Concern Present  (5/15/2023)    Received from [facility]    [place] [employer] - Occupational Stress Questionnaire     Feeling of Stress : Only a little   Social Connections: Moderately Integrated (5/28/2024)    Received from [facility]    Social Connection and Isolation Panel [NHANES]     Frequency of Communication with Friends and Family: More than three times a week     Frequency of Social Gatherings with Friends and Family: Once a week     Attends [employer] Services: 1 to 4 times per year     Active Member of Clubs or Organizations: Yes     Attends Club or Organization Meetings: More than 4 times per year     Marital Status: Never    Housing Stability: Unknown (5/28/2024)    Received from [facility]    Housing Stability Vital Sign     Unable to Pay for Housing in the Last Year: No     Unstable Housing in the Last Year: No        Raffaele Sandoval DO  05/20/25 [id number]

## 2025-05-18 NOTE — ED TRIAGE NOTES
Patient's Sp02 during triage ranging from 87-91% on room air. When speaking patient's Sp02 at 87-88% on RA.

## 2025-05-18 NOTE — H&P
History Of Present Illness  Helen Johnston is a 74 y.o. female presenting with 74-year-old female presenting with chief complaint of intermittent cough associated with worsening shortness of breath x 3 days.  Past medical history significant for COPD/asthma with no home O2 requirement, hypothyroidism, paroxysmal atrial fibrillation status post ablation on chronic Xarelto therapy.  Patient seen and examined at bedside in the ED able to provide history/good historian.  Patient endorses that she has been experiencing intermittent cough with worsening shortness of breath over the past 3 days noting she she endorses today prior to arrival she began to experience central wall chest discomfort rating it a 2 out of 10 dull achy pain nonradiating.  Denies history of prior symptoms in the past.  Denies any recent sick contacts, fevers or chills.  Ambulation worsens her symptoms of shortness of breath but no chest pain..  She endorses compliance with her CPAP at night.  Her lower extremity edema is chronic and appears to be at baseline.  She denies any urinary symptoms to include dysuria or frequency.  She denies any tobacco use, EtOH use or illicit drug use.  She lives in a home alone with a pet dog is a retired schoolteacher.    ED Course:  -Vitals: T afebrile, , RR 20, /74, 90% O2 RA  -Labs: CMP reveals mild elevation in serum glucose 114, sodium 136, potassium 4.5, chloride 98, bicarbonate 31.  BUN 15 and a serum creatinine of 0.90.  LFTs grossly unremarkable.  BNP 70.  Troponin 20 1 repeat 22.  CBC with differential reveals no evidence of leukocytosis WBC of 4.4, hemoglobin 14.5 and platelet count of 152.  -Imaging: CXR is remarkable for enlarged cardiac silhouette with pulmonary vascular congestion and mild edema.  Patchy bibasilar air space disease likely component of edema versus atelectatic changes.  -Treatment Course: Patient presented in mild distress in the setting of respiratory status, found to be  moderately tachycardic with rates as high as 130 status post 2 dosings of adenosine with minimal effect, cardiology consulted from the ED recommending admission for stabilization and observation pending echocardiogram and laboratory evaluation.      PCP: Karlie     CODE STATUS: FULL .     Past Medical History  She has a past medical history of COPD (chronic obstructive pulmonary disease) (Multi) and Diabetes mellitus (Multi).    Surgical History  She has no past surgical history on file.     Social History  She reports that she has never smoked. She has never used smokeless tobacco. She reports that she does not drink alcohol and does not use drugs.    Family History  Family History[1]     Allergies  Lisinopril, Thiopental, Vancomycin, Cefuroxime, Erythromycin, Linezolid, Novocain [procaine], and Silver sulfadiazine    Review of Systems   Constitutional:  Negative for fever.   Respiratory:  Positive for cough, shortness of breath and wheezing.    Cardiovascular:  Positive for chest pain, palpitations and leg swelling.   Gastrointestinal:  Negative for abdominal pain, constipation, diarrhea, nausea and vomiting.   Genitourinary:  Positive for frequency. Negative for difficulty urinating and dysuria.   Skin:  Positive for rash.   Neurological:  Negative for dizziness, seizures and numbness.   Psychiatric/Behavioral:  Negative for confusion.         Physical Exam  Vitals and nursing note reviewed.   Constitutional:       General: She is not in acute distress.     Appearance: Normal appearance. She is not toxic-appearing.   HENT:      Mouth/Throat:      Mouth: Mucous membranes are moist.      Pharynx: Oropharynx is clear.   Eyes:      Extraocular Movements: Extraocular movements intact.      Conjunctiva/sclera: Conjunctivae normal.      Pupils: Pupils are equal, round, and reactive to light.   Cardiovascular:      Rate and Rhythm: Tachycardia present. Rhythm irregular.      Pulses: Normal pulses.      Heart sounds:  Normal heart sounds.   Pulmonary:      Effort: Pulmonary effort is normal. No respiratory distress.      Breath sounds: Wheezing present.   Abdominal:      General: Abdomen is flat.      Palpations: Abdomen is soft.   Musculoskeletal:         General: Normal range of motion.      Cervical back: Neck supple.      Right lower leg: Edema present.      Left lower leg: Edema present.   Skin:     General: Skin is warm.      Capillary Refill: Capillary refill takes less than 2 seconds.      Findings: Rash present.   Neurological:      General: No focal deficit present.      Mental Status: She is alert and oriented to person, place, and time.   Psychiatric:         Mood and Affect: Mood normal.         Behavior: Behavior normal.          Last Recorded Vitals  /70 (BP Location: Right arm, Patient Position: Lying)   Pulse (!) 104 Comment: s/p adenosine administration  Temp 36.4 °C (97.6 °F) (Temporal)   Resp 20   Wt 117 kg (257 lb)   SpO2 95%     Relevant Results  Scheduled medications  Scheduled Medications[2]  Continuous medications  Continuous Medications[3]  PRN medications  PRN Medications[4]    Results for orders placed or performed during the hospital encounter of 05/18/25 (from the past 24 hours)   CBC with Differential   Result Value Ref Range    WBC 4.4 4.4 - 11.3 x10*3/uL    nRBC 0.0 0.0 - 0.0 /100 WBCs    RBC 5.08 4.00 - 5.20 x10*6/uL    Hemoglobin 14.5 12.0 - 16.0 g/dL    Hematocrit 46.1 (H) 36.0 - 46.0 %    MCV 91 80 - 100 fL    MCH 28.5 26.0 - 34.0 pg    MCHC 31.5 (L) 32.0 - 36.0 g/dL    RDW 13.5 11.5 - 14.5 %    Platelets 152 150 - 450 x10*3/uL    Neutrophils % 72.6 40.0 - 80.0 %    Immature Granulocytes %, Automated 0.2 0.0 - 0.9 %    Lymphocytes % 13.0 13.0 - 44.0 %    Monocytes % 13.2 2.0 - 10.0 %    Eosinophils % 0.5 0.0 - 6.0 %    Basophils % 0.5 0.0 - 2.0 %    Neutrophils Absolute 3.18 1.60 - 5.50 x10*3/uL    Immature Granulocytes Absolute, Automated 0.01 0.00 - 0.50 x10*3/uL    Lymphocytes  Absolute 0.57 (L) 0.80 - 3.00 x10*3/uL    Monocytes Absolute 0.58 0.05 - 0.80 x10*3/uL    Eosinophils Absolute 0.02 0.00 - 0.40 x10*3/uL    Basophils Absolute 0.02 0.00 - 0.10 x10*3/uL   Comprehensive Metabolic Panel   Result Value Ref Range    Glucose 114 (H) 74 - 99 mg/dL    Sodium 136 136 - 145 mmol/L    Potassium 4.5 3.5 - 5.3 mmol/L    Chloride 98 98 - 107 mmol/L    Bicarbonate 31 21 - 32 mmol/L    Anion Gap 12 10 - 20 mmol/L    Urea Nitrogen 15 6 - 23 mg/dL    Creatinine 0.90 0.50 - 1.05 mg/dL    eGFR 68 >60 mL/min/1.73m*2    Calcium 10.3 8.6 - 10.3 mg/dL    Albumin 4.0 3.4 - 5.0 g/dL    Alkaline Phosphatase 85 33 - 136 U/L    Total Protein 7.9 6.4 - 8.2 g/dL    AST 23 9 - 39 U/L    Bilirubin, Total 0.8 0.0 - 1.2 mg/dL    ALT 10 7 - 45 U/L   Brain Natriuretic Peptide   Result Value Ref Range    BNP 70 0 - 99 pg/mL   Troponin I, High Sensitivity, Initial   Result Value Ref Range    Troponin I, High Sensitivity 21 (H) 0 - 13 ng/L   Troponin, High Sensitivity, 1 Hour   Result Value Ref Range    Troponin I, High Sensitivity 22 (H) 0 - 13 ng/L       XR chest 1 view  Result Date: 5/18/2025  Interpreted By:  Dre Pinedo, STUDY: XR CHEST 1 VIEW;  5/18/2025 2:26 pm   INDICATION: Signs/Symptoms:Chest Pain.     COMPARISON: None.   ACCESSION NUMBER(S): [id number]   ORDERING CLINICIAN: SHELBY JONES   FINDINGS:         CARDIOMEDIASTINAL SILHOUETTE: Cardiomediastinal silhouette is moderately enlarged. There is pulmonary vascular congestion with mild interstitial edema   LUNGS: Patchy bibasilar airspace disease present. There is no large effusion   ABDOMEN: No remarkable upper abdominal findings.   BONES: No acute osseous changes.       Enlarged cardiac silhouette with pulmonary vascular congestion and mild edema .  Patchy bibasilar airspace disease likely component of the edema versus atelectatic changes     MACRO: None   Signed by: Dre Pinedo 5/18/2025 2:52 PM Dictation workstation:   [id number]        Assessment/Plan       74-year-old female presenting with chief complaint of worsening shortness of breath associate with cough x 3 days.  Vitals significant for modest tachycardia upon arrival to the ED status post 2 dosings of adenosine for atrial tachycardia with minimal effect, concern for COPD exacerbation in the setting of viral URI given complaint for SOB and cough. Cardiology consulted with plan of echocardiogram and overnight observation.  Upon discharge to follow-up with electrophysiology cardiology Dr. Rojas will resume all patient's medications as appropriate.    #Dyspnea secondary to COPD/Asthma exacerbation without hypoxia  #Concern for viral upper respiratory syndrome   #JENI  #Obesity  - Chest x-ray remarkable for enlarged cardiac silhouette and pulmonary vascular congestion mild edema  - Review of vitals revealed NO reported/recorded hypoxia with SpO2 on room air at 90%  -Patient endorses medication noncompliance noting she only took spironolactone this morning and no other medications.  - Cardiology on consult with recommendations of echocardiogram with recommendation 40 mg IV Lasix however BNP normal   - Ordered strict I's and O's  - Patient status post 125 mg of IV Solu-Medrol in the ED  - Will continue to treat COPD exacerbation with 40 mg daily prednisone to complete a 5-day course of steroids  - Ordered procalcitonin low suspicion for bacterial pneumonia given chest x-ray findings and lack of leukocytosis  - Ordered respiratory viral panel  - Will continue patient's home CPAP nightly  - Will treat symptoms of cough with Robitussin DM as needed for symptoms of shortness of breath every 4 hours  - Will continue with DuoNebs as needed every 4 hours for symptoms of worsening shortness of breath and cough  - Will continue to supplement O2 to maintain oxygen saturations greater than 88% will wean as tolerated      #Paroxysmal atrial fibrillation  #Ectopic atrial rhythm with tachycardia    #Hypothyroidism  - EKG reveals junctional rhythm with tachycardia  - Status post adenosine in the ED initial 6 mg with little effect repeat 12 mg with little effect  - Cardiology on consult recommending echocardiogram, follows with Dr. Belcher  - Upon discharge anticipate patient to follow-up with electrophysiology cardiology Dr. Rojas  - Status post 2 g of magnesium in the ED.  -Will continue with metoprolol succinate 50 mg daily per cardiology recommendation  - Ordered TSH with reflex     VTE prophylaxis: Will continue patient's home Xarelto 20 mg daily  Maintenance fluids: None  Diet: Cardiac  Consults: Cardiology  PT OT: Not indicated  Telemetry: Indicated    CODE STATUS: Full code    Dispo: Patient admitted for concerns of COPD exacerbation requiring new oxygen at 3 L nasal cannula, cardiology on consult given tachyarrhythmia pending echocardiogram and further evaluation likely requiring 1-2 additional minutes.    Assessment and plan to be discussed with attending physician    Jovani Sebastian DO  PGY-3, Internal Medicine [facility]         [1] No family history on file.  [2] adenosine, , ,   atorvastatin, 20 mg, oral, Nightly  furosemide, 40 mg, oral, Daily  metoprolol succinate XL, 50 mg, oral, Daily  rivaroxaban, 20 mg, oral, Daily with evening meal  [START ON 5/19/2025] spironolactone, 25 mg, oral, Daily  [3]    [4] PRN medications: adenosine, oxygen

## 2025-05-18 NOTE — CONSULTS
Consults  History Of Present Illness:    Helen Johnston is a 74 y.o. female presenting with several days of a cough with a minimal amount of sputum.  No obvious fever or chills.  She has been working with her sleep apnea machine with adjustments including oxygen and this has been difficult.  She think she may have picked up a respiratory illness from the machine.  No obvious palpitations but was noted to have a modest tachycardia when she came to the emergency room.    With adenosine infusion it was found to have an atrial tachycardia.  She had a loop recorder in that testing has been shown to have a small burden of atrial fibrillation.  Normally follows with Dr. Rojas.    She has chronic leg edema which has not changed.  Her shortness of breath has been stable and she has a wheeze which is somewhat worse than normal.  She is been using her inhalers.    She also complains of a central chest ache which radiates up into her throat and to between her shoulder blades.  It has been persistent.  It seems to get worse with a cough.    Her past medical hist includes remote history atrial fibrillation with atrial tachycardia after an ablation.  Had a dilated idiopathic cardiomyopathy that is improved with control of her heart rhythm.  She has had low blood pressure in the past, diastolic dysfunction, venous varicosities, dependent edema, soft heart murmur, mild mitral insufficiency.  She also has a history of first atrial ventricular block.    Past surgical history includes a pulmonary vein isolation 2019, 2 transesophageal echocardiogram cardioversion, skin graft to the right thigh, right leg tendon repair, colonoscopy with polypectomy, cardiac catheterization 2010, cataract surgery, echocardiogram, loop recorder, CT angiogram in August 2023 that showed trivial coronary disease and a calcium score of 42.    Family shows her mother had a stroke, father had bladder cancer    She lives in a house.  She is retired  "teacher.    Never used tobacco.  No alcohol use.    Medications are reviewed and include atorvastatin, fluconazole tablet ipratropium, metoprolol succinate 50 mg daily tirzepatide which apparently the patient is not taking, Xarelto, DuoNeb inhaler, spironolactone, cyclosporine eyedrops, levothyroxine, ketoconazole shampoo, albuterol inhaler.    Allergies procaine, erythromycin, Nasalide, Silvadene, vancomycin and Zestril.    Review of systems has been noted.  She is overweight but stable.  She has does have sleep apnea.  She wears glasses.  History of cataracts.  She has had sinus congestion.  She does have seasonal allergies.  She has sleep apnea and has struggles with the mask.  She does have asthma.  She has had no pancreatic disease liver disease nausea vomiting or diarrhea.  No history of kidney stones.  She does have some modest arthritis.  She has hypothyroidism.     Last Recorded Vitals:  Vitals:    05/18/25 1343 05/18/25 1430 05/18/25 1510 05/18/25 1619   BP: 122/74 145/81  155/70   BP Location: Right arm Right arm  Right arm   Patient Position: Sitting Sitting  Lying   Pulse: (!) 130 (!) 124  (!) 104   Resp: 20 20  20   Temp: 36.4 °C (97.6 °F)      TempSrc: Temporal      SpO2: (!) 90% (!) 91% 96% 95%   Weight: 117 kg (257 lb)      Height: 1.626 m (5' 4\")          Last Labs:  CBC - 5/18/2025:  2:02 PM  4.4 14.5 152    46.1      CMP - 5/18/2025:  2:02 PM  10.3 7.9 23 --- 0.8   _ 4.0 10 85      PTT - No results in last year.  _   _ _     Troponin I, High Sensitivity   Date/Time Value Ref Range Status   05/18/2025 04:17 PM 22 (H) 0 - 13 ng/L Final   05/18/2025 02:02 PM 21 (H) 0 - 13 ng/L Final   06/11/2024 08:33 AM 16 (H) 0 - 13 ng/L Final     BNP   Date/Time Value Ref Range Status   05/18/2025 02:02 PM 70 0 - 99 pg/mL Final   11/29/2022 04:55 AM 72 0 - 99 pg/mL Final     Comment:     .  <100 pg/mL - Heart failure unlikely  100-299 pg/mL - Intermediate probability of acute heart  .               failure " exacerbation. Correlate with clinical  .               context and patient history.    >=300 pg/mL - Heart Failure likely. Correlate with clinical  .               context and patient history.  BNP testing is performed using different testing   methodology at [facility] than at other   system hospitals. Direct result comparisons should   only be made within the same method.     10/28/2022 05:44 AM 75 0 - 99 pg/mL Final     Comment:     .  <100 pg/mL - Heart failure unlikely  100-299 pg/mL - Intermediate probability of acute heart  .               failure exacerbation. Correlate with clinical  .               context and patient history.    >=300 pg/mL - Heart Failure likely. Correlate with clinical  .               context and patient history.  BNP testing is performed using different testing   methodology at [facility] than at other   system hospitals. Direct result comparisons should   only be made within the same method.       POC HEMOGLOBIN A1c   Date/Time Value Ref Range Status   05/13/2024 12:02 PM 5.9 4.2 - 6.5 % Final     HEMOGLOBIN A1c   Date/Time Value Ref Range Status   04/01/2025 03:30 PM 6.1 (H) <5.7 % of total Hgb Final     Comment:     For someone without known diabetes, a hemoglobin   A1c value between 5.7% and 6.4% is consistent with  prediabetes and should be confirmed with a   follow-up test.     For someone with known diabetes, a value <7%  indicates that their diabetes is well controlled. A1c  targets should be individualized based on duration of  diabetes, age, comorbid conditions, and other  considerations.     This assay result is consistent with an increased risk  of diabetes.     Currently, no consensus exists regarding use of  hemoglobin A1c for diagnosis of diabetes for children.          Hemoglobin A1C   Date/Time Value Ref Range Status   11/28/2023 11:12 AM 6.0 (H) 4.3 - 5.6 % Final     Comment:     American Diabetes Association guidelines indicate that  patients with HgbA1c in the range 5.7-6.4% are at increased risk for development of diabetes, and intervention by lifestyle modification may be beneficial. HgbA1c greater or equal to 6.5% is considered diagnostic of diabetes.   05/16/2023 08:45 AM 5.8 (H) 4.3 - 5.6 % Final     Comment:     American Diabetes Association guidelines indicate that patients with HgbA1c in the range 5.7-6.4% are at increased risk for development of diabetes, and intervention by lifestyle modification may be beneficial. HgbA1c greater or equal to 6.5% is considered diagnostic of diabetes.     POCT Hemoglobin A1C   Date/Time Value Ref Range Status   02/26/2024 01:36 PM 5.9 (A) 4.3 - 5.6 % Final     Comment:     Location:[facility], [address] [place] [address],   [facility]  Point of care (POC) Hemoglobin A1c (HGBA1C) testing is intended to assess  glucose control and provide a management tool for patients known to have  diabetes and their healthcare providers.  Target HGBA1C levels may depend on  specific clinical circumstances.  POC HGBA1C is not intended for use as a  diagnostic or screening test; laboratory-based testing should be used for  diagnostic purposes.  The following information is supplemental and may not  be applicable to specific diabetes management situations:  The POC device   provides a normal range of 4.2% to 6.5% for the HGBA1C POC test.  However, the American Diabetes Association  guidelines indicate that  patients with HGBA1C in the range of 5.7% to 6.4% are at increased risk for  development of diabetes and that intervention by lifestyle modification may  be beneficial.  A HGBA1C level greater than or equal to 6.5% is considered  diagnostic of diabetes, pending confirmatory testing.  Use of HGBA1C testing  to evaluate glucose control may not be appropriate for patients with  hemoglobin variants or other conditions (e.g. anemia) that alter red blood  cell lifespan.     LDL-CHOLESTEROL    Date/Time Value Ref Range Status   04/01/2025 03:30 PM 79 mg/dL (calc) Final     Comment:     Reference range: <100     Desirable range <100 mg/dL for primary prevention;    <70 mg/dL for patients with CHD or diabetic patients   with > or = 2 CHD risk factors.     LDL-C is now calculated using the Amirah   calculation, which is a validated novel method providing   better accuracy than the Friedewald equation in the   estimation of LDL-C.   Chapin TRONCOSO et al. VILMA. 2013;310(38): 5685-5642   (http://education.Schedulize/faq/CNQ144)       VLDL   Date/Time Value Ref Range Status   11/30/2022 06:10 AM 10 0 - 40 mg/dL Final      Last I/O:  No intake/output data recorded.    Past Cardiology Tests (Last 3 Years):  EKG: EKG appears to be an accelerated junctional rhythm.  There also may be an ectopic atrial tachycardia with rates around 100-110 bpm.  ECG 12 lead (Clinic Performed) 01/31/2025      ECG 12 lead (Clinic Performed) 11/07/2024      ECG 12 lead (Clinic Performed) 10/31/2024      ECG 12 lead 06/11/2024      ECG 12 lead (Clinic Performed) 05/09/2024      ECG 12 lead (Clinic Performed) 11/09/2023  Narrative & Impression   Interpreted By:  Dre Pinedo,   STUDY:  XR CHEST 1 VIEW;  5/18/2025 2:26 pm      INDICATION:  Signs/Symptoms:Chest Pain.          COMPARISON:  None.      ACCESSION NUMBER(S):  [id number]      ORDERING CLINICIAN:  SHELBY JONES      FINDINGS:                  CARDIOMEDIASTINAL SILHOUETTE:  Cardiomediastinal silhouette is moderately enlarged. There is  pulmonary vascular congestion with mild interstitial edema      LUNGS:  Patchy bibasilar airspace disease present. There is no large effusion      ABDOMEN:  No remarkable upper abdominal findings.      BONES:  No acute osseous changes.      IMPRESSION:  Enlarged cardiac silhouette with pulmonary vascular congestion and  mild edema .  Patchy bibasilar airspace disease likely component of  the edema versus atelectatic changes         "  MACRO:  None      Signed by: Dre Khris 5/18/2025 2:52 PM  Dictation workstation:   [id number]     Echo:  No results found for this or any previous visit from the past 1095 days.    Ejection Fractions:  No results found for: \"EF\"  Cath:  No results found for this or any previous visit from the past 1095 days.    Stress Test:  No results found for this or any previous visit from the past 1095 days.    Cardiac Imaging:  No results found for this or any previous visit from the past 1095 days.      Past Medical History:  She has a past medical history of COPD (chronic obstructive pulmonary disease) (Multi) and Diabetes mellitus (Multi).    Past Surgical History:  She has no past surgical history on file.      Social History:  She reports that she has never smoked. She has never used smokeless tobacco. She reports that she does not drink alcohol and does not use drugs.    Family History:  Family History[1]     Allergies:  Lisinopril, Thiopental, Vancomycin, Cefuroxime, Erythromycin, Linezolid, Novocain [procaine], and Silver sulfadiazine    Inpatient Medications:  Scheduled Medications[2]  PRN Medications[3]  Continuous Medications[4]  Outpatient Medications:  Current Outpatient Medications   Medication Instructions    albuterol 90 mcg/actuation inhaler 2 puffs, inhalation, Every 4 hours PRN    atorvastatin (LIPITOR) 40 mg, oral, Daily    budesonide-glycopyr-formoterol (Breztri Aerosphere) 160-9-4.8 mcg/actuation HFA aerosol inhaler 2 puffs, 2 times daily RT    clobetasol (Olux) 0.05 % topical foam Topical, 2 times daily PRN    compression socks, large misc 2 each, miscellaneous, Daily    cycloSPORINE 0.05 % drops 1 drop, Both Eyes, 2 times daily    fluconazole (DIFLUCAN) 150 mg, oral, Daily    fluocinolone (DermOtic) 0.01 % ear drops 5 drops, Each Ear, 2 times daily    ipratropium-albuteroL (Duo-Neb) 0.5-2.5 mg/3 mL nebulizer solution 3 mL, nebulization, Every 4 hours PRN    ketoconazole (NIZOral) 2 % shampoo 1 " Application, Topical, 2 times weekly    levothyroxine (SYNTHROID, LEVOXYL) 50 mcg, Daily    metoprolol succinate XL (TOPROL-XL) 50 mg, oral, Nightly    rivaroxaban (XARELTO) 20 mg, oral, Daily with evening meal    spironolactone (ALDACTONE) 25 mg, oral, Daily    tirzepatide (weight loss) (ZEPBOUND) 2.5 mg, subcutaneous, Every 7 days       Physical Exam:  Pleasant overweight woman no distress    Skin is warm and dry color is good    Chronic edema lower extremities with some chronic's stasis changes.    Sclera white and conjunctiva pink    Nose and throat are clear    Cannot assess jugular venous tension with thick neck.    Card exams are rhythmic and regular rate with a 1/6 systolic flow murmur left upper sternal border.  Point of maximal impulse is not palpable.  Pulse are 2+ and equal bilaterally.    Abdomen soft nondistended nontender positive bowel sounds    Extremity show no cyanosis.  Edema is noted.    Alert and oriented spheres    Moving all 4 extremity equally    Mood affect insight all appear to be normal.    Telemetry appears to be an ectopic atrial rhythm with rates between 90 and 110 bpm.     Assessment/Plan   #1 ectopic atrial rhythm-rate control and anticoagulation for the time being given that there is a small burden of atrial fibrillation noted on loop recorder on review of the chart.  Will discuss with Dr. Rojas further treatment.  Will need an early follow-up electrophysiology.    2.  Shortness of breath appears to be predominant due to asthma and a respiratory infection as her BNP level is in the normal range.  Will check an echocardiogram to exclude any deterioration of her LV function.    3.  Slight elevation of troponins appears to be a type II event.  Will repeat EKG and check endocardium for wall motion abnormalities.  She had an CT angiogram approximately 2 years ago that was unremarkable.  Consider Marta scan as an outpatient depending on the patient's clinical course.    4.  Soft heart  murmur likely benign.  Had a unremarkable echocardiogram in 2022 although did have some modest diastolic function.  Will check an echocardiogram.    #5 chest discomfort does not appear to be obviously cardiac in origin.  Atypical angina likely related to her cough and likely viral URI.  Will check for wall motion normalities and repeat EKG.    Our service will follow.    Discussed with the patient.  Peripheral IV 05/18/25 20 G Left Antecubital (Active)   Present on Admission to Healthcare Facility No 05/18/25 1403   Site Assessment Clean;Dry;Intact;Pink 05/18/25 1403   Dressing Type Transparent 05/18/25 1403   Line Status Flushed;Saline locked;Blood return noted 05/18/25 1403   Number of days: 0       Code Status:  No Order    I spent 65 minutes in the professional and overall care of this patient.        Jorge Luis Belcher MD         [1] No family history on file.  [2]   Scheduled medications   Medication Dose Route Frequency    adenosine       [3]   PRN medications   Medication    adenosine    oxygen   [4]   Continuous Medications   Medication Dose Last Rate

## 2025-05-19 ENCOUNTER — APPOINTMENT (OUTPATIENT)
Dept: CARDIOLOGY | Facility: HOSPITAL | Age: 75
End: 2025-05-19
Payer: MEDICARE

## 2025-05-19 ENCOUNTER — PHARMACY VISIT (OUTPATIENT)
Dept: PHARMACY | Facility: CLINIC | Age: 75
End: 2025-05-19
Payer: COMMERCIAL

## 2025-05-19 ENCOUNTER — HOSPITAL ENCOUNTER (OUTPATIENT)
Dept: CARDIOLOGY | Facility: HOSPITAL | Age: 75
Setting detail: OBSERVATION
Discharge: HOME | End: 2025-05-19
Payer: MEDICARE

## 2025-05-19 VITALS
DIASTOLIC BLOOD PRESSURE: 73 MMHG | TEMPERATURE: 98.4 F | BODY MASS INDEX: 46.99 KG/M2 | OXYGEN SATURATION: 92 % | WEIGHT: 265.21 LBS | SYSTOLIC BLOOD PRESSURE: 140 MMHG | RESPIRATION RATE: 21 BRPM | HEIGHT: 63 IN | HEART RATE: 88 BPM

## 2025-05-19 PROBLEM — L85.3 XEROSIS CUTIS: Status: RESOLVED | Noted: 2022-12-02 | Resolved: 2025-05-19

## 2025-05-19 LAB
ALBUMIN SERPL BCP-MCNC: 3.8 G/DL (ref 3.4–5)
ANION GAP SERPL CALC-SCNC: 10 MMOL/L (ref 10–20)
AORTIC VALVE MEAN GRADIENT: 7 MMHG
AORTIC VALVE PEAK VELOCITY: 1.72 M/S
AV PEAK GRADIENT: 12 MMHG
AVA (PEAK VEL): 1.15 CM2
AVA (VTI): 1.16 CM2
BASOPHILS # BLD AUTO: 0 X10*3/UL (ref 0–0.1)
BASOPHILS NFR BLD AUTO: 0 %
BUN SERPL-MCNC: 22 MG/DL (ref 6–23)
CALCIUM SERPL-MCNC: 10.2 MG/DL (ref 8.6–10.3)
CHLORIDE SERPL-SCNC: 98 MMOL/L (ref 98–107)
CO2 SERPL-SCNC: 30 MMOL/L (ref 21–32)
CREAT SERPL-MCNC: 0.94 MG/DL (ref 0.5–1.05)
EGFRCR SERPLBLD CKD-EPI 2021: 64 ML/MIN/1.73M*2
EJECTION FRACTION APICAL 4 CHAMBER: 63.1
EJECTION FRACTION: 58 %
EOSINOPHIL # BLD AUTO: 0 X10*3/UL (ref 0–0.4)
EOSINOPHIL NFR BLD AUTO: 0 %
ERYTHROCYTE [DISTWIDTH] IN BLOOD BY AUTOMATED COUNT: 13.3 % (ref 11.5–14.5)
GLUCOSE SERPL-MCNC: 168 MG/DL (ref 74–99)
HCT VFR BLD AUTO: 45.2 % (ref 36–46)
HGB BLD-MCNC: 14.1 G/DL (ref 12–16)
IMM GRANULOCYTES # BLD AUTO: 0.01 X10*3/UL (ref 0–0.5)
IMM GRANULOCYTES NFR BLD AUTO: 0.4 % (ref 0–0.9)
LEFT VENTRICLE INTERNAL DIMENSION DIASTOLE: 3.81 CM (ref 3.5–6)
LEFT VENTRICULAR OUTFLOW TRACT DIAMETER: 1.6 CM
LV EJECTION FRACTION BIPLANE: 62 %
LYMPHOCYTES # BLD AUTO: 0.44 X10*3/UL (ref 0.8–3)
LYMPHOCYTES NFR BLD AUTO: 18 %
MAGNESIUM SERPL-MCNC: 2.11 MG/DL (ref 1.6–2.4)
MCH RBC QN AUTO: 28.2 PG (ref 26–34)
MCHC RBC AUTO-ENTMCNC: 31.2 G/DL (ref 32–36)
MCV RBC AUTO: 90 FL (ref 80–100)
MITRAL VALVE E/A RATIO: 3.65
MITRAL VALVE E/E' RATIO: 16.3
MONOCYTES # BLD AUTO: 0.09 X10*3/UL (ref 0.05–0.8)
MONOCYTES NFR BLD AUTO: 3.7 %
NEUTROPHILS # BLD AUTO: 1.9 X10*3/UL (ref 1.6–5.5)
NEUTROPHILS NFR BLD AUTO: 77.9 %
NRBC BLD-RTO: 0 /100 WBCS (ref 0–0)
PHOSPHATE SERPL-MCNC: 3.4 MG/DL (ref 2.5–4.9)
PLATELET # BLD AUTO: 166 X10*3/UL (ref 150–450)
POTASSIUM SERPL-SCNC: 4.4 MMOL/L (ref 3.5–5.3)
PROCALCITONIN SERPL-MCNC: 0.09 NG/ML
RBC # BLD AUTO: 5 X10*6/UL (ref 4–5.2)
RIGHT VENTRICLE PEAK SYSTOLIC PRESSURE: 25.7 MMHG
SODIUM SERPL-SCNC: 134 MMOL/L (ref 136–145)
WBC # BLD AUTO: 2.4 X10*3/UL (ref 4.4–11.3)

## 2025-05-19 PROCEDURE — 80069 RENAL FUNCTION PANEL: CPT

## 2025-05-19 PROCEDURE — 2500000001 HC RX 250 WO HCPCS SELF ADMINISTERED DRUGS (ALT 637 FOR MEDICARE OP): Performed by: INTERNAL MEDICINE

## 2025-05-19 PROCEDURE — 36415 COLL VENOUS BLD VENIPUNCTURE: CPT

## 2025-05-19 PROCEDURE — 99236 HOSP IP/OBS SAME DATE HI 85: CPT

## 2025-05-19 PROCEDURE — 83735 ASSAY OF MAGNESIUM: CPT

## 2025-05-19 PROCEDURE — G0378 HOSPITAL OBSERVATION PER HR: HCPCS

## 2025-05-19 PROCEDURE — 85025 COMPLETE CBC W/AUTO DIFF WBC: CPT

## 2025-05-19 PROCEDURE — 2500000001 HC RX 250 WO HCPCS SELF ADMINISTERED DRUGS (ALT 637 FOR MEDICARE OP)

## 2025-05-19 PROCEDURE — RXMED WILLOW AMBULATORY MEDICATION CHARGE

## 2025-05-19 PROCEDURE — 2500000002 HC RX 250 W HCPCS SELF ADMINISTERED DRUGS (ALT 637 FOR MEDICARE OP, ALT 636 FOR OP/ED)

## 2025-05-19 PROCEDURE — 2500000004 HC RX 250 GENERAL PHARMACY W/ HCPCS (ALT 636 FOR OP/ED)

## 2025-05-19 PROCEDURE — 93306 TTE W/DOPPLER COMPLETE: CPT

## 2025-05-19 PROCEDURE — 93005 ELECTROCARDIOGRAM TRACING: CPT

## 2025-05-19 RX ORDER — GUAIFENESIN/DEXTROMETHORPHAN 100-10MG/5
5 SYRUP ORAL EVERY 4 HOURS PRN
Qty: 118 ML | Refills: 0 | Status: SHIPPED | OUTPATIENT
Start: 2025-05-19

## 2025-05-19 RX ORDER — FUROSEMIDE 40 MG/1
40 TABLET ORAL DAILY
Qty: 30 TABLET | Refills: 0 | Status: SHIPPED | OUTPATIENT
Start: 2025-05-20 | End: 2025-06-19

## 2025-05-19 RX ORDER — PREDNISONE 20 MG/1
40 TABLET ORAL DAILY
Qty: 6 TABLET | Refills: 0 | Status: SHIPPED | OUTPATIENT
Start: 2025-05-20 | End: 2025-05-23

## 2025-05-19 RX ADMIN — PREDNISONE 40 MG: 20 TABLET ORAL at 10:56

## 2025-05-19 RX ADMIN — FLUCONAZOLE 150 MG: 150 TABLET ORAL at 10:55

## 2025-05-19 RX ADMIN — FUROSEMIDE 40 MG: 40 TABLET ORAL at 10:57

## 2025-05-19 RX ADMIN — SPIRONOLACTONE 25 MG: 25 TABLET ORAL at 10:56

## 2025-05-19 RX ADMIN — LEVOTHYROXINE SODIUM 50 MCG: 0.05 TABLET ORAL at 06:38

## 2025-05-19 RX ADMIN — SENNOSIDES 17.2 MG: 8.6 TABLET, FILM COATED ORAL at 10:56

## 2025-05-19 RX ADMIN — POLYETHYLENE GLYCOL 3350 17 G: 17 POWDER, FOR SOLUTION ORAL at 10:56

## 2025-05-19 RX ADMIN — RIVAROXABAN 20 MG: 20 TABLET, FILM COATED ORAL at 16:08

## 2025-05-19 ASSESSMENT — PAIN SCALES - GENERAL: PAINLEVEL_OUTOF10: 0 - NO PAIN

## 2025-05-19 ASSESSMENT — ACTIVITIES OF DAILY LIVING (ADL): LACK_OF_TRANSPORTATION: NO

## 2025-05-19 NOTE — DISCHARGE INSTRUCTIONS
Please call and follow up with your primary care provider, Dr. Ziegler, and schedule a follow up appointment in 2-3 days needing to call to schedule this appointment contact permission provided above.  Will be leaving the hospital with a prescription for prednisone 20 mg tablets to be taken 2 tablets/day equaling 40 mg daily for the next 3 days.  He also be leaving the hospital with oxygen supply.  Also seen evaluated by her cardiologist Dr. Belcher with recommendations of continuing Lasix 40 mg daily     Please take all of your medications as directed.     New medications:    1.  Prednisone (Deltasone) 20 mg tablet take 2 tablets by mouth once daily for the next 3 days  2.  Furosemide (Lasix) 40 mg tablet take 1 tablet by mouth once daily  3.  Dextromethorphan-guaifenesin (Robitussin DM)  mg / 5 mL oral liquid to be taken every 4 hours as needed for cough.      Discontinued medications:    N/A     If you have any concerning symptoms, or worsening symptoms please call or return, such as chest pain, shortness of breath, new onset confusion, loss of sensation, or fever >103F.     Thank you for allowing us to participate in your health care.     -[facility] [place] [facility] [place] [facility]

## 2025-05-19 NOTE — DISCHARGE SUMMARY
Discharge Diagnosis  COPD exacerbation (Multi)      Issues Requiring Follow-Up    #Atrial arrhythmia with junctional escape  #Compensated diastolic dysfunction  - Seen evaluated by cardiology given reported atrial arrhythmia with junctional escape in the ED status post 2 dose of adenosine  - Resumed on her home metoprolol succinate 50 mg daily with review of telemetry overnight revealing rates remained well-controlled on her home medication.  - Chest x-ray revealed findings consistent with vascular congestion, cardiology recommending resuming her home 40 mg of Lasix and adjunct to her spironolactone dosing  - Etiology of patient's tachyarrhythmia likely in the setting of medication noncompliance noting reported 2 days of not taking home meds  - Patient is to follow-up with electrophysiology Dr. Rojas on Thursday  - Leaving the hospital with a prescription for 40 mg of oral Lasix to be taken daily until follow-up with her cardiologist Dr. Belcher.  - To continue taking her Xarelto 20 mg daily    #COPD exacerbation  #Sleep apnea on CPAP  - Received 125 mg of Solu-Medrol in the ED-transition to oral prednisone 40 daily for total of 5-day burst course.  - Viral panel grossly unremarkable  - Treat symptoms of cough with as needed Robitussin DM  - Patient had a ambulatory pulse oximeter test with RT noting she remained persistently hypoxic  - Will be going home with home O2  - Is to follow-up with her primary care provider for further management of her JENI on CPAP    Discharge Meds     Medication List      START taking these medications     predniSONE 20 mg tablet; Commonly known as: Deltasone; Take 2 tablets   (40 mg) by mouth once daily for 3 doses.; Start taking on: May 20, 2025     CHANGE how you take these medications     atorvastatin 40 mg tablet; Commonly known as: Lipitor; Take 1 tablet (40   mg) by mouth once daily.; What changed: when to take this     CONTINUE taking these medications     albuterol 90 mcg/actuation  inhaler; Inhale 2 puffs every 4 hours if   needed for wheezing or shortness of breath (cough).   clobetasol 0.05 % topical foam; Commonly known as: Olux; Apply topically   2 times a day as needed (scalp psoriasis).   compression socks, large misc; 2 each once daily.   cycloSPORINE 0.05 % drops   fluconazole 150 mg tablet; Commonly known as: Diflucan   fluocinolone 0.01 % ear drops; Commonly known as: DermOtic; Administer 5   drops into each ear 2 times a day.   ipratropium-albuteroL 0.5-2.5 mg/3 mL nebulizer solution; Commonly known   as: Duo-Neb; Take 3 mL by nebulization every 4 hours if needed for   wheezing or shortness of breath.   ketoconazole 2 % shampoo; Commonly known as: NIZOral   levothyroxine 50 mcg tablet; Commonly known as: Synthroid, Levoxyl   lubricating eye drops ophthalmic solution   metoprolol succinate XL 50 mg 24 hr tablet; Commonly known as:   Toprol-XL; Take 1 tablet (50 mg) by mouth once daily at bedtime.   rivaroxaban 20 mg tablet; Commonly known as: Xarelto; Take 1 tablet (20   mg) by mouth once daily in the evening. Take with meals.   spironolactone 25 mg tablet; Commonly known as: Aldactone; Take 1 tablet   (25 mg) by mouth once daily.     STOP taking these medications     tirzepatide (weight loss) 2.5 mg/0.5 mL injection; Commonly known as:   Zepbound       Test Results Pending At Discharge  Pending Labs       Order Current Status    Respiratory Viral Panel In process            Hospital Course  74-year-old female presenting with chief complaint of worsening shortness of breath associate with cough for 3 days, found to have a modest tachycardia on arrival to the ED status post 2 dosings of adenosine for atrial tachycardia with minimal effect, concern for COPD exacerbation versus decompensation of diastolic heart failure.  Seen evaluated by cardiology with recommendations of reinitiating oral Lasix at 40 mg daily.  Echocardiogram revealed LVEF of 55 to 60%, normal right ventricular global  systolic function with mild aortic valve regurgitation.  Chest x-ray revealed enlarged cardiac silhouette with pulmonary vascular congestion and mild edema patchy bibasilar airspace disease likely component of edema versus atelectatic changes.  Low concern for infectious etiology given equivocal procalcitonin and no evidence of leukocytosis.  BNP 70.  Patient treated for COPD exacerbation with 5-day course of steroids noting she got 125 mg of Solu-Medrol in the ED de-escalated to 40 mg prednisone for an additional 4 days.  Symptoms of cough treated appropriately with Robitussin DM as needed for hours.  Patient had an ambulatory oxygen study revealing she was hypoxic with ambulation on room air requiring a prescription for home-going oxygen.  Per cardiology's recommendation patient will be going home with a prescription for 40 mg of oral Lasix to be taken daily until she can follow-up with her cardiologist Dr. Belcher.  Patient is to follow-up with electrophysiology Dr. Rojas given her presenting symptom of modest tachycardia suspected to be atrial tachycardia with junctional escape.  Patient medically stable for discharge current plan of discharge discussed with patient patient understanding agreeable noting she is to meet the oxygen delivery company this evening to take ownership of an oxygen concentrator.  Return precautions provided.    Pertinent Physical Exam At Time of Discharge  Physical Exam  Vitals and nursing note reviewed.   Constitutional:       Appearance: Normal appearance.   HENT:      Head: Normocephalic and atraumatic.      Mouth/Throat:      Mouth: Mucous membranes are moist.      Pharynx: Oropharynx is clear.   Eyes:      Extraocular Movements: Extraocular movements intact.      Conjunctiva/sclera: Conjunctivae normal.      Pupils: Pupils are equal, round, and reactive to light.   Cardiovascular:      Rate and Rhythm: Normal rate and regular rhythm.      Pulses: Normal pulses.      Heart sounds:  Normal heart sounds.   Pulmonary:      Effort: Pulmonary effort is normal. No respiratory distress.      Breath sounds: Normal breath sounds.   Abdominal:      General: Abdomen is flat.      Palpations: Abdomen is soft.   Musculoskeletal:      Cervical back: Neck supple.      Right lower leg: Edema present.      Left lower leg: Edema present.   Skin:     General: Skin is warm and dry.      Capillary Refill: Capillary refill takes less than 2 seconds.   Neurological:      General: No focal deficit present.      Mental Status: She is alert and oriented to person, place, and time.   Psychiatric:         Mood and Affect: Mood normal.         Behavior: Behavior normal.         Outpatient Follow-Up  Future Appointments   Date Time Provider Department Center   5/22/2025 11:45 AM Otto Rojas MD [id number] [place]   6/5/2025  8:00 AM PRISCILA Bowen-KALYAN [id number] [facility]   8/6/2025 10:00 AM PRISCILA Chamberlain-CNP [id number] [facility]   10/14/2025  1:30 PM Melanie Shah MD PhD [id number] Academic         Jovani Sebastian, DO  PGY3, internal medicine [facility]

## 2025-05-19 NOTE — PROGRESS NOTES
"Subjective Data:  Patient feeling better today.  Less shortness of breath.  Still has a cough.  No obvious wheezing.    Overnight Events:    Telemetry overnight showed normal sinus rhythm with restricted return of clear block, junctional escape with narrow complex and PACs.     Objective Data:  Last Recorded Vitals:  Vitals:    05/18/25 2015 05/18/25 2328 05/18/25 2358 05/19/25 0800   BP:   142/70 125/72   BP Location:   Right arm Right arm   Patient Position:   Lying Sitting   Pulse:   75 73   Resp:  22 20 18   Temp:   36 °C (96.8 °F) 36.1 °C (97 °F)   TempSrc:    Temporal   SpO2:   94% 91%   Weight: 120 kg (265 lb 3.4 oz)      Height: 1.6 m (5' 3\")          Last Labs:  CBC - 5/19/2025:  7:34 AM  2.4 14.1 166    45.2      CMP - 5/19/2025:  7:34 AM  10.2 7.9 23 --- 0.8   3.4 3.8 10 85      PTT - No results in last year.  _   _ _     TROPHS   Date/Time Value Ref Range Status   05/18/2025 04:17 PM 22 0 - 13 ng/L Final   05/18/2025 02:02 PM 21 0 - 13 ng/L Final   06/11/2024 08:33 AM 16 0 - 13 ng/L Final     BNP   Date/Time Value Ref Range Status   05/18/2025 02:02 PM 70 0 - 99 pg/mL Final   11/29/2022 04:55 AM 72 0 - 99 pg/mL Final     Comment:     .  <100 pg/mL - Heart failure unlikely  100-299 pg/mL - Intermediate probability of acute heart  .               failure exacerbation. Correlate with clinical  .               context and patient history.    >=300 pg/mL - Heart Failure likely. Correlate with clinical  .               context and patient history.  BNP testing is performed using different testing   methodology at [facility] than at other   [facility] hospitals. Direct result comparisons should   only be made within the same method.     10/28/2022 05:44 AM 75 0 - 99 pg/mL Final     Comment:     .  <100 pg/mL - Heart failure unlikely  100-299 pg/mL - Intermediate probability of acute heart  .               failure exacerbation. Correlate with clinical  .               context and patient history.    " >=300 pg/mL - Heart Failure likely. Correlate with clinical  .               context and patient history.  BNP testing is performed using different testing   methodology at [facility] than at other   [facility] hospitals. Direct result comparisons should   only be made within the same method.       HGBA1C   Date/Time Value Ref Range Status   04/01/2025 03:30 PM 6.1 <5.7 % of total Hgb Final     Comment:     For someone without known diabetes, a hemoglobin   A1c value between 5.7% and 6.4% is consistent with  prediabetes and should be confirmed with a   follow-up test.     For someone with known diabetes, a value <7%  indicates that their diabetes is well controlled. A1c  targets should be individualized based on duration of  diabetes, age, comorbid conditions, and other  considerations.     This assay result is consistent with an increased risk  of diabetes.     Currently, no consensus exists regarding use of  hemoglobin A1c for diagnosis of diabetes for children.        05/13/2024 12:02 PM 5.9 4.2 - 6.5 % Final   02/26/2024 01:36 PM 5.9 4.3 - 5.6 % Final     Comment:     Location:[facility], [address] [place] [address],   [facility]  Point of care (POC) Hemoglobin A1c (HGBA1C) testing is intended to assess  glucose control and provide a management tool for patients known to have  diabetes and their healthcare providers.  Target HGBA1C levels may depend on  specific clinical circumstances.  POC HGBA1C is not intended for use as a  diagnostic or screening test; laboratory-based testing should be used for  diagnostic purposes.  The following information is supplemental and may not  be applicable to specific diabetes management situations:  The POC device   provides a normal range of 4.2% to 6.5% for the HGBA1C POC test.  However, the American Diabetes Association  guidelines indicate that  patients with HGBA1C in the range of 5.7% to 6.4% are at increased risk for  development  of diabetes and that intervention by lifestyle modification may  be beneficial.  A HGBA1C level greater than or equal to 6.5% is considered  diagnostic of diabetes, pending confirmatory testing.  Use of HGBA1C testing  to evaluate glucose control may not be appropriate for patients with  hemoglobin variants or other conditions (e.g. anemia) that alter red blood  cell lifespan.   11/28/2023 11:12 AM 6.0 4.3 - 5.6 % Final     Comment:     American Diabetes Association guidelines indicate that patients with HgbA1c in the range 5.7-6.4% are at increased risk for development of diabetes, and intervention by lifestyle modification may be beneficial. HgbA1c greater or equal to 6.5% is considered diagnostic of diabetes.   05/16/2023 08:45 AM 5.8 4.3 - 5.6 % Final     Comment:     American Diabetes Association guidelines indicate that patients with HgbA1c in the range 5.7-6.4% are at increased risk for development of diabetes, and intervention by lifestyle modification may be beneficial. HgbA1c greater or equal to 6.5% is considered diagnostic of diabetes.     LDLCALC   Date/Time Value Ref Range Status   04/01/2025 03:30 PM 79 mg/dL (calc) Final     Comment:     Reference range: <100     Desirable range <100 mg/dL for primary prevention;    <70 mg/dL for patients with CHD or diabetic patients   with > or = 2 CHD risk factors.     LDL-C is now calculated using the Chapin-Pauline   calculation, which is a validated novel method providing   better accuracy than the Friedewald equation in the   estimation of LDL-C.   Chapin SS et al. VILMA. 2013;310(19): 2540-5465   (http://education.Hookit.com/faq/XFW201)       VLDL   Date/Time Value Ref Range Status   11/30/2022 06:10 AM 10 0 - 40 mg/dL Final    Sars-CoV-2 and Influenza A/B PCR: [id number]  Order: [id number]   Collected 5/18/2025 20:04       Status: Final result    Test Result Released: Yes (not seen)    0 Result Notes      Component  Ref Range & Units    Flu A  Result  Not Detected Not Detected   Flu B Result  Not Detected Not Detected   Coronavirus 2019, PCR  Not Detected Not Detected        Last I/O:  I/O last 3 completed shifts:  In: 530 (4.4 mL/kg) [P.O.:480; I.V.:50 (0.4 mL/kg)]  Out: 900 (7.5 mL/kg) [Urine:900 (0.2 mL/kg/hr)]  Weight: 120.3 kg     Past Cardiology Tests (Last 3 Years):  EKG:  ECG 12 lead (Clinic Performed) 01/31/2025      ECG 12 lead (Clinic Performed) 11/07/2024      ECG 12 lead (Clinic Performed) 10/31/2024      ECG 12 lead 06/11/2024      ECG 12 lead (Clinic Performed) 05/09/2024      ECG 12 lead (Clinic Performed) 11/09/2023    Echo:CONCLUSIONS:   1. Left ventricular ejection fraction is normal, by visual estimate at 55-60%.   2. Spectral Doppler shows a Grade II (pseudonormal pattern) of left ventricular diastolic filling with an elevated left atrial pressure.   3. There is normal right ventricular global systolic function.   4. Heavy posterior mitral annular calcification noted.   5. Right ventricular systolic pressure is within normal limits.   6. Mild aortic valve regurgitation.   7. The inferior vena cava appears mildly dilated, with IVC inspiratory collapse less than 50%.   8. There is moderately increased septal and moderately increased posterior left ventricular wall thickness.  Transthoracic Echo (TTE) Complete 05/19/2025    Ejection Fractions:  EF   Date/Time Value Ref Range Status   05/19/2025 09:44 AM 58 %      Cath:  No results found for this or any previous visit from the past 1095 days.    Stress Test:  No results found for this or any previous visit from the past 1095 days.    Cardiac Imaging:  No results found for this or any previous visit from the past 1095 days.      Inpatient Medications:  Scheduled Medications[1]  PRN Medications[2]  Continuous Medications[3]    Physical Exam:  Pleasant overweight woman no distress    Skin is warm and dry color is good    Sclera white and conjunctiva pink    Lungs show mildly diminished  breath sounds throughout but no crackle or wheeze.    Cardiac exams are rhythmic regular rate with 1/6 systolic flow murmur left upper sternal border.    Abdomen soft nondistended nontender positive bowel sounds    Extremities show 1+ ankle edema with some chronic stasis changes.    Alert and oriented all spheres    Moving all 4 extremities equally    Mood affect insight appear to be normal.    Telemetry shows normal sinus rhythm with procedure ventricular block, junctional escape beats with narrow complex, PACs.  No obvious atrial fibrillation.     Assessment/Plan   #1 atrial arrhythmias and junctional escape with adequate heart rate response.  Continue AV kushal blocking agent and follow-up with Dr. Rojas at the end of the week as scheduled.  Continue anticoagulation.    2.  Slight elevation in troponin is a type II event and does not appear to be ischemic heart disease.    3.  Chest discomfort appears to be more musculoskeletal and pulmonary in nature.  Will discuss with patient further testing as an outpatient.  Had a near normal CT coronary angiography in August 2023.  Patient does have mitral and calcification    4.  Compensated diastolic dysfunction.    5.  Soft heart murmur due to aortic sclerosis.  There is mild aortic insufficiency but no significant stenosis.  Continue to monitor clinically.    Discussed with the patient    Appears stable for discharge to home from a cardiology standpoint.  Peripheral IV 05/18/25 20 G Left Antecubital (Active)   Present on Admission to Healthcare Facility No 05/18/25 2015   Site Assessment Clean;Dry;Intact 05/18/25 2015   Dressing Type Transparent 05/18/25 2015   Line Status No blood return;Flushed 05/18/25 2015   Dressing Status Clean;Dry;Occlusive 05/18/25 2015   Number of days: 1       Code Status:  Full Code    I spent 35 minutes in the professional and overall care of this patient.        Jorge Luis Belcher MD       [1]   Scheduled medications   Medication Dose Route  Frequency    atorvastatin  40 mg oral Nightly    fluconazole  150 mg oral Daily    furosemide  40 mg oral Daily    levothyroxine  50 mcg oral Once per day on Monday Tuesday Wednesday Thursday Friday Saturday    melatonin  3 mg oral Nightly    metoprolol succinate XL  50 mg oral Daily    polyethylene glycol  17 g oral BID    predniSONE  40 mg oral Daily    rivaroxaban  20 mg oral Daily with evening meal    sennosides  2 tablet oral BID    spironolactone  25 mg oral Daily   [2]   PRN medications   Medication    acetaminophen    Or    acetaminophen    Or    acetaminophen    dextromethorphan-guaifenesin    ipratropium-albuteroL    lubricating eye drops    ondansetron    Or    ondansetron    oxygen    oxygen   [3]   Continuous Medications   Medication Dose Last Rate

## 2025-05-19 NOTE — SIGNIFICANT EVENT
05/19/25 1222 05/19/25 1225 05/19/25 1229   Home Oxygen Eval / Desaturation Screen   Home Oxygen Evaluation/Desaturation Study Yes  --   --    Medical Gas Therapy None (Room air) None (Room air) Supplemental oxygen   Medical Gas Delivery Method  --   --  Nasal cannula   Oxygen L/min Patient is on During the Study  --   --  2 L/min   SpO2 90 % (!) 88 % 94 %   Patient Activity During Study At rest Ambulating  --    Patient Qualify for Home Oxygen  --  Yes  --    Recommended L/min with Activity  --  2 L/min  --

## 2025-05-19 NOTE — PROGRESS NOTES
05/19/25 1448   Discharge Planning   Living Arrangements Alone   Support Systems Friends/neighbors   Assistance Needed none   Type of Residence Private residence   Do you have animals or pets at home? Yes   Type of Animals or Pets dog  (patient left her dog at home, locked in the kitchen with 2 days of water and food. She denies having anyone to care for the dog.)   Home or Post Acute Services None   Expected Discharge Disposition Home   Does the patient need discharge transport arranged? No   Financial Resource Strain   How hard is it for you to pay for the very basics like food, housing, medical care, and heating? Not hard   Housing Stability   In the last 12 months, was there a time when you were not able to pay the mortgage or rent on time? N   At any time in the past 12 months, were you homeless or living in a shelter (including now)? N   Transportation Needs   In the past 12 months, has lack of transportation kept you from medical appointments or from getting medications? no   In the past 12 months, has lack of transportation kept you from meetings, work, or from getting things needed for daily living? No   Stroke Family Assessment   Stroke Family Assessment Needed No   Intensity of Service   Intensity of Service 0-30 min     Met with patient at the bedside, confirmed demographics, insurance, and pcp is Dr. Ziegler. She lives alone and uses the [facility] facility for services such as transport, meals, and activities. She does have a friend but she is sick at [facility] main campus. Will most likely need  a ride when discharged. She uses a rollator to ambulate, and drives on occasion and uses the senior transport. Currently, uses a cpap at night, unit was supplied by [employer].  She does worry about finances to cover maintenance of her home, but is able to purchase her medications. She plans to discharge home and denies any additional services needed.

## 2025-05-19 NOTE — CARE PLAN
The patient's goals for the shift include getting rest.    The clinical goals for the shift include maintain a safe environment through the end of the shift.    Over the shift, the patient did make progress on these goals.  Pt unable to tolerate CPAP with current settings.  RN notified RT.  RT attempted to adjust, however, pt was sleeping.  Pt remains in isolation until Respiratory panel results.  Pt refuses bed alarm.  Uses rollator to get around.  Pt is currently wearing 2 liters oxygen.  Call light in reach.

## 2025-05-19 NOTE — NURSING NOTE
PT.DISCHARGED HOME---SELF FOR TRANSPORT. SALINE LOCK REMOVED---PT.IS TO RECEIVE HOME O2---O2 COMPANY TO MEET THERE--CALLED PRIOR TO DC---PT.VERBALIZES UNDERSTANDING OF DC INSTRUCTIONS INCLUDING FOLLOW UP'S WITH VARIOUS DOCS & MEDS TO BED SERVICE. NO ACUTE DISTRESS NOTED.

## 2025-05-20 ENCOUNTER — TELEPHONE (OUTPATIENT)
Dept: SLEEP MEDICINE | Facility: HOSPITAL | Age: 75
End: 2025-05-20

## 2025-05-20 ENCOUNTER — PATIENT OUTREACH (OUTPATIENT)
Dept: PRIMARY CARE | Facility: CLINIC | Age: 75
End: 2025-05-20

## 2025-05-20 ENCOUNTER — TELEPHONE (OUTPATIENT)
Dept: PRIMARY CARE | Facility: CLINIC | Age: 75
End: 2025-05-20

## 2025-05-20 ENCOUNTER — TELEPHONE (OUTPATIENT)
Dept: PULMONOLOGY | Facility: HOSPITAL | Age: 75
End: 2025-05-20

## 2025-05-20 ENCOUNTER — APPOINTMENT (OUTPATIENT)
Dept: PRIMARY CARE | Facility: CLINIC | Age: 75
End: 2025-05-20
Payer: MEDICARE

## 2025-05-20 DIAGNOSIS — J44.89 COPD WITH ASTHMA (MULTI): ICD-10-CM

## 2025-05-20 LAB
ATRIAL RATE: 117 BPM
ATRIAL RATE: 89 BPM
PR INTERVAL: 310 MS
Q ONSET: 208 MS
Q ONSET: 209 MS
Q ONSET: 210 MS
Q ONSET: 211 MS
QRS COUNT: 19 BEATS
QRS COUNT: 20 BEATS
QRS COUNT: 21 BEATS
QRS COUNT: 9 BEATS
QRS DURATION: 82 MS
QRS DURATION: 82 MS
QRS DURATION: 86 MS
QRS DURATION: 90 MS
QT INTERVAL: 294 MS
QT INTERVAL: 314 MS
QT INTERVAL: 322 MS
QT INTERVAL: 428 MS
QTC CALCULATION(BAZETT): 423 MS
QTC CALCULATION(BAZETT): 425 MS
QTC CALCULATION(BAZETT): 438 MS
QTC CALCULATION(BAZETT): 447 MS
QTC FREDERICIA: 376 MS
QTC FREDERICIA: 392 MS
QTC FREDERICIA: 401 MS
QTC FREDERICIA: 425 MS
R AXIS: -39 DEGREES
R AXIS: -42 DEGREES
R AXIS: -43 DEGREES
R AXIS: -43 DEGREES
T AXIS: 28 DEGREES
T AXIS: 81 DEGREES
T AXIS: 85 DEGREES
T AXIS: 85 DEGREES
T OFFSET: 358 MS
T OFFSET: 367 MS
T OFFSET: 370 MS
T OFFSET: 422 MS
VENTRICULAR RATE: 116 BPM
VENTRICULAR RATE: 117 BPM
VENTRICULAR RATE: 126 BPM
VENTRICULAR RATE: 59 BPM

## 2025-05-20 RX ORDER — BUDESONIDE, GLYCOPYRROLATE, AND FORMOTEROL FUMARATE 160; 9; 4.8 UG/1; UG/1; UG/1
2 AEROSOL, METERED RESPIRATORY (INHALATION)
Qty: 10.7 G | Refills: 3 | Status: SHIPPED | OUTPATIENT
Start: 2025-05-20 | End: 2025-05-21 | Stop reason: SDUPTHER

## 2025-05-20 NOTE — TELEPHONE ENCOUNTER
Patient called she was just released from the hospital. But she is not feeling good still. She is still having a hard time with everything and she was advised to go back to the hospital if she wasn't feeling good but she does not want to go back she stated she states she does not want to leave her dog alone and that her  was recently admitted as well. Patient would like to know what PCP wants her to do. She did state her oxygen is also low. I am currently looking for a slot to book patient in for a hospital follow up.

## 2025-05-20 NOTE — TELEPHONE ENCOUNTER
Pt called with c/o increase sob. She was admitted for COPD exacerbation on 5/18 and left yesterday because she needed to take care of her dog. She was discharged on 2L and has been checking her spo2 at home. She has been 91% on 2L. She stated that she lost her Breztri and states that the nebulizer doesn't help her at all. She states she has  3 more days of prednisone 40 mg and then will be done with her course from her hospital stay. I expressed the importance of taking her medications, which she verbalized understanding. Kimmy Mcwilliams CNP notified. Per Kimmy Mcwilliams CNP, pt needs to take breztri daily. Order placed and routed to Kimmy Mcwilliams CNP to sign. Tried to call pt at [phone] regarding plan of care, but was unsuccessful. A detailed voicemail was left stating she needs to take breztri daily along with her nebulizer treatments to help her sob. Pt instructed to go to ER if her symptoms worsen. Pt was also instructed to return the call at [phone] for any further questions or concerns.

## 2025-05-20 NOTE — PROGRESS NOTES
Discharge Facility: [facility]  Discharge Diagnosis: COPD exacerbation  Admission Date: 5/18/2025  Discharge Date: 5/19/2025    PCP Appointment Date:  -5/20/2025 1120    Specialist Appointment Date:   -5/22/2025 1145 cardiology    Hospital Encounter and Summary Linked: Yes    ED to Hosp-Admission (Discharged) with Wallace Dolan DO; Raffaele Sandoval DO (05/18/2025)     See discharge assessment below for further details    Wrap Up  Wrap Up Additional Comments: Pt was admitted to [facility] 5/18-5/19/2025 for COPD exacerbation. Pt reports not feeling better since discharge. Pt discharged on 2L oxygen; she reports oxygen equipment has been delivered. Pt states she does have a CPAP at night. Pt states her oxygen is at 92% but she is dealing with shortness of breath both on exertion and at rest. Pt discharged with prescriptions for prednisone, lasix, and robitussin; pt reports frustation d/t diuretics. Pt states she is already taking spironolactone and if she takes lasix she cannot leave d/t frequent urination. Message sent to PCP and office clinical staff. Verified pt upcoming appt 5/22/2025 1145. Pt reports being upset she was unable to be scheduled with PCP until 5/30/2025. Pt requesting sooner appt, pt scheduled for appt today 5/20/2025 1120; message sent to office clerical pool with update. Update on condition sent to PCP. Pt encouraged to call if questions or needs arise. (5/20/2025 10:34 AM)  Call End Time: 1053 (5/20/2025 10:34 AM)    Engagement  Call Start Time: 1034 (5/20/2025 10:34 AM)    Medications  Medications reviewed with patient/caregiver?: Yes (new prescriptions reviewed;  lasix, maxtussin dm, and prednisone) (5/20/2025 10:34 AM)  Is the patient having any side effects they believe may be caused by any medication additions or changes?: -- (pt is upset with diuretics and states she cannot leave the house d/t frequent urination) (5/20/2025 10:34 AM)  Does the patient have all medications ordered at  discharge?: Yes (5/20/2025 10:34 AM)  Care Management Interventions: No intervention needed (5/20/2025 10:34 AM)  Is the patient taking all medications as directed (includes completed medication regime)?: -- (pt states she is not taking lasix d/t frequent urination and already taking spironolactone; update sent to PCP.) (5/20/2025 10:34 AM)    Appointments  Does the patient have a primary care provider?: Yes (5/20/2025 10:34 AM)  Care Management Interventions: Verified appointment date/time/provider (5/20/2025 10:34 AM)  Has the patient kept scheduled appointments due by today?: Yes (5/20/2025 10:34 AM)    Self Management  Has home health visited the patient within 72 hours of discharge?: Not applicable (5/20/2025 10:34 AM)  What Durable Medical Equipment (DME) was ordered?: oxygen (5/20/2025 10:34 AM)  Has all Durable Medical Equipment (DME) been delivered?: Yes (5/20/2025 10:34 AM)    Patient Teaching  Does the patient have access to their discharge instructions?: Yes (5/20/2025 10:34 AM)  Care Management Interventions: Reviewed instructions with patient (5/20/2025 10:34 AM)  What is the patient's perception of their health status since discharge?: Worsening (5/20/2025 10:34 AM)  Is the patient/caregiver able to teach back the hierarchy of who to call/visit for symptoms/problems? PCP, Specialist, Home Health nurse, Urgent Care, ED, 911: Yes (5/20/2025 10:34 AM)

## 2025-05-20 NOTE — TELEPHONE ENCOUNTER
Pt called asking about PAP settings, explained current settings and advised to schedule sleep study. Pt stated she's short of breath while wearing O2 and oxygen is 91%. Advised to take breathing treatment and transferred to pulm to assist further.

## 2025-05-21 ENCOUNTER — TELEPHONE (OUTPATIENT)
Dept: PULMONOLOGY | Facility: HOSPITAL | Age: 75
End: 2025-05-21
Payer: MEDICARE

## 2025-05-21 DIAGNOSIS — J44.89 COPD WITH ASTHMA (MULTI): ICD-10-CM

## 2025-05-21 LAB
ADENOVIRUS RVP, VIRC: NOT DETECTED
ENTEROVIRUS/RHINOVIRUS RVP, VIRC: NOT DETECTED
HUMAN BOCAVIRUS RVP, VIRC: NOT DETECTED
HUMAN CORONAVIRUS RVP, VIRC: NOT DETECTED
INFLUENZA A , VIRC: NOT DETECTED
INFLUENZA A H1N1-09 , VIRC: NOT DETECTED
INFLUENZA B PCR, VIRC: NOT DETECTED
METAPNEUMOVIRUS , VIRC: POSITIVE
PARAINFLUENZA PCR, VIRC: NOT DETECTED
RSV PCR, RVP, VIRC: NOT DETECTED

## 2025-05-21 RX ORDER — BUDESONIDE, GLYCOPYRROLATE, AND FORMOTEROL FUMARATE 160; 9; 4.8 UG/1; UG/1; UG/1
2 AEROSOL, METERED RESPIRATORY (INHALATION)
Qty: 10.7 G | Refills: 6 | Status: SHIPPED | OUTPATIENT
Start: 2025-05-21

## 2025-05-21 NOTE — TELEPHONE ENCOUNTER
Pt would like breztri to go to [facility] in [place] instead of [facility]. Order placed and routed to Kimmy Mcwilliams CNP to sign.

## 2025-05-22 ENCOUNTER — APPOINTMENT (OUTPATIENT)
Dept: PRIMARY CARE | Facility: CLINIC | Age: 75
End: 2025-05-22
Payer: MEDICARE

## 2025-05-22 ENCOUNTER — APPOINTMENT (OUTPATIENT)
Dept: CARDIOLOGY | Facility: CLINIC | Age: 75
End: 2025-05-22
Payer: MEDICARE

## 2025-05-22 ENCOUNTER — OFFICE VISIT (OUTPATIENT)
Facility: CLINIC | Age: 75
End: 2025-05-22
Payer: MEDICARE

## 2025-05-22 VITALS
TEMPERATURE: 97.1 F | HEART RATE: 83 BPM | OXYGEN SATURATION: 89 % | WEIGHT: 246.8 LBS | DIASTOLIC BLOOD PRESSURE: 70 MMHG | BODY MASS INDEX: 43.73 KG/M2 | SYSTOLIC BLOOD PRESSURE: 118 MMHG | HEIGHT: 63 IN

## 2025-05-22 VITALS
OXYGEN SATURATION: 94 % | DIASTOLIC BLOOD PRESSURE: 64 MMHG | WEIGHT: 265 LBS | BODY MASS INDEX: 46.95 KG/M2 | HEIGHT: 63 IN | HEART RATE: 89 BPM | SYSTOLIC BLOOD PRESSURE: 104 MMHG

## 2025-05-22 DIAGNOSIS — I42.0 DILATED IDIOPATHIC CARDIOMYOPATHY (MULTI): ICD-10-CM

## 2025-05-22 DIAGNOSIS — I48.0 PAROXYSMAL ATRIAL FIBRILLATION (MULTI): Primary | ICD-10-CM

## 2025-05-22 DIAGNOSIS — I50.32 CHRONIC DIASTOLIC (CONGESTIVE) HEART FAILURE: ICD-10-CM

## 2025-05-22 DIAGNOSIS — J44.89 COPD WITH ASTHMA (MULTI): ICD-10-CM

## 2025-05-22 DIAGNOSIS — R09.02 HYPOXIA: ICD-10-CM

## 2025-05-22 DIAGNOSIS — J44.1 COPD WITH ACUTE EXACERBATION (MULTI): Primary | ICD-10-CM

## 2025-05-22 PROCEDURE — 3008F BODY MASS INDEX DOCD: CPT

## 2025-05-22 PROCEDURE — 3078F DIAST BP <80 MM HG: CPT

## 2025-05-22 PROCEDURE — 99214 OFFICE O/P EST MOD 30 MIN: CPT

## 2025-05-22 PROCEDURE — 1036F TOBACCO NON-USER: CPT

## 2025-05-22 PROCEDURE — 3074F SYST BP LT 130 MM HG: CPT

## 2025-05-22 PROCEDURE — 1159F MED LIST DOCD IN RCRD: CPT

## 2025-05-22 RX ORDER — DOXYCYCLINE HYCLATE 100 MG
100 TABLET ORAL 2 TIMES DAILY
Qty: 20 TABLET | Refills: 0 | Status: SHIPPED | OUTPATIENT
Start: 2025-05-22 | End: 2025-06-01

## 2025-05-22 RX ORDER — PREDNISONE 10 MG/1
TABLET ORAL
Qty: 30 TABLET | Refills: 0 | Status: SHIPPED | OUTPATIENT
Start: 2025-05-22 | End: 2025-06-21

## 2025-05-22 NOTE — PROGRESS NOTES
Cardiac Electrophysiology Office Visit     Referred by Dr. Wharton ref. provider found for   No chief complaint on file.    HPI:  Helen Johnston is a 74 y.o. year old female patient with h/o hypertension, diastolic heart failure, diabetes, HLD, JENI (not complaint with CPAP) paroxysmal atrial fibrillation presenting today for follow up    PMHx/PSHx: As above  Tobacco Denies, Alcohol Social, Caffeine use  1 cups of tea / day, Drug use  Denies    Objective  Allergies   Allergen Reactions    Lisinopril Anaphylaxis    Thiopental Shortness of breath    Vancomycin Shortness of breath    Cefuroxime Rash    Erythromycin Swelling and Rash    Linezolid Itching and Swelling    Novocain [Procaine] Palpitations     Heart racing with dental procedure    Silver Sulfadiazine Swelling and Rash      Current Outpatient Medications   Medication Instructions    albuterol 90 mcg/actuation inhaler 2 puffs, inhalation, Every 4 hours PRN    atorvastatin (LIPITOR) 40 mg, oral, Daily    budesonide-glycopyr-formoterol (Breztri Aerosphere) 160-9-4.8 mcg/actuation HFA aerosol inhaler 2 puffs, inhalation, 2 times daily RT, Rinse mouth with water after use to reduce aftertaste and incidence of candidiasis. Do not swallow.    clobetasol (Olux) 0.05 % topical foam Topical, 2 times daily PRN    compression socks, large misc 2 each, miscellaneous, Daily    cycloSPORINE 0.05 % drops 1 drop, 2 times daily    dextromethorphan-guaifenesin (Robitussin DM)  mg/5 mL oral liquid 5 mL, oral, Every 4 hours PRN    fluconazole (DIFLUCAN) 150 mg, Daily    fluocinolone (DermOtic) 0.01 % ear drops 5 drops, Each Ear, 2 times daily    furosemide (LASIX) 40 mg, oral, Daily    ipratropium-albuteroL (Duo-Neb) 0.5-2.5 mg/3 mL nebulizer solution 3 mL, nebulization, Every 4 hours PRN    ketoconazole (NIZOral) 2 % shampoo 1 Application, 2 times weekly    levothyroxine (SYNTHROID, LEVOXYL) 50 mcg, Daily    lubricating eye drops ophthalmic solution 1 drop, As needed     "metoprolol succinate XL (TOPROL-XL) 50 mg, oral, Nightly    predniSONE (DELTASONE) 40 mg, oral, Daily    rivaroxaban (XARELTO) 20 mg, oral, Daily with evening meal    spironolactone (ALDACTONE) 25 mg, oral, Daily         Visit Vitals  /64 (BP Location: Left arm, Patient Position: Sitting)   Pulse 89   Ht 1.6 m (5' 3\")   Wt 120 kg (265 lb)   SpO2 94%   BMI 46.94 kg/m²   OB Status Postmenopausal   Smoking Status Never   BSA 2.31 m²      Physical Exam  Vitals reviewed.   Constitutional:       Appearance: Normal appearance. She is obese.   HENT:      Head: Normocephalic.   Cardiovascular:      Rate and Rhythm: Normal rate and regular rhythm.   Pulmonary:      Effort: Pulmonary effort is normal. No respiratory distress.      Breath sounds: No wheezing.   Skin:     General: Skin is warm and dry.      Capillary Refill: Capillary refill takes less than 2 seconds.   Neurological:      Mental Status: She is alert.   Psychiatric:         Mood and Affect: Mood normal.      My Interpretation of Reviewed Study(s):  Results  DIAGNOSTIC  Echo (June 2022): Normal left ventricular systolic function with an EF of 60 to 65%.  Mildly dilated left atrium with a right atrium upper limit of normal.  No pericardial effusion noted.  [id number] Score  Age 65-74: 1   Sex Female: 1   CHF History Yes: 1   HTN Yes: 1   Stroke/TIA/Thromboembolism No: 0   Vascular Dz: CAD/PAD/Aortic Plaque No: 0   DM No: 0   Total Score 4     Assessment & Plan  Paroxysmal atrial fibrillation s/p CryoPVI (2018)  AF Dx History: years ago, feels fatigue but not palpitations or fluttering sensation.; h/o Cardioversion: Yes; AAD Use: Dofetilide 500mcg BID (stopped due to QT prolongation and SSS); Anticoagulation use: Xarelto 20mg Daily (current) Warfarin (stopped due to Change in Rx); h/o Ablation: 2018; [id number] Score: 4. Overall burden 5.8% on ILR.  Paroxysmal atrial fibrillation with episodes of tachycardia and bradycardia. Recent chemical cardioversion " "in the emergency department. Possible exacerbation due to missed metoprolol doses. She prefers to delay potential repeat ablation or pacemaker placement until after June 5th due to personal commitments. Pacemaker placement discussed as an alternative to facilitate medication adjustments and potential AV node ablation.  - Discuss potential repeat ablation (with Dr. Bright) or pacemaker placement +/- AV node ablation at next visit      Junctional Rhythm - Resolved  Pt reports some \"off balance: ILR does not any episodes of significant bradycardia or pauses. The off balance is during ambulation.   Counseled pt on monitoring for dizziness, syncope or near syncope    ILR monitoring    ILR shows overall burden of AT/AF based on frequency has been increasing.    HTN  BP recently has been lower recently. Losartan was recently d/c'd    Return to Clinic: Patient should return to the EP Clinic in 6 months    Otto Rojas MD [facility]  Cardiac Electrophysiology  [email or web address]    **Disclaimer: This note was dictated by speech recognition, and every effort has been made to prevent any error in transcription, however minor errors may be present**    This medical note was created with the assistance of artificial intelligence (AI) for documentation purposes. The content has been reviewed and confirmed by the healthcare provider for accuracy and completeness. Patient consented to the use of audio recording and use of AI during their visit.      "

## 2025-05-22 NOTE — PROGRESS NOTES
Patient: Helen Johnston    [id number]  : 1950 -- AGE 74 y.o.    Provider: Kimmy FRANCOIS- CNP     [place] [facility] [place] Date: 25            [facility]  Follow Up Visit Note      HISTORY OF PRESENT ILLNESS     The patient's referring provider is: No ref. provider found    HISTORY OF PRESENT ILLNESS   Helen Johnston is a 74 y.o. female with a history of mild intermittent asthma, COPD, JENI on CPAP, paroxysmal atrial fibrillation, idiopathic cardiomyopathy/HFpEF, GERD, hypothyroidism, HLD, HTN who is a never smoker, who presents to a [facility] for an initial evaluation for asthma/COPD.     I have independently interviewed and examined the patient in the office and reviewed available records.    Current History    Since last visit she had a hospital stay after not feeling well last Thursday constantly coughing and feeling short of breath.  She was diagnosed with COPD exacerbation and decompensated diastolic heart failure.  She was sent home with 2 days of steroids, Robitussin and oxygen.  Was not treated with any antibiotics. Has not been using her Breztri because she lost it. Reports feeling overwhelmed with issues at home and her health.  She is not wearing 2 L oxygen continuously.  States she cannot Stop coughing, it is causing her to urinate on herself.    25: On today's visit, the patient reports being referred by Dr. Shah.  She seen him for JENI.  She had recent COPD exacerbation in December.  Feels back to baseline.    Has Breztri but states she only uses that occasionally.  Uses her albuterol once every day.  States she has used DuoNebs about 4 times in the past few weeks.  Wears CPAP about 4 hours a night.  She reports she has a munir house and if her house was not so munir she would not have as many breathing problems.  Has a York terrier dog and thinks she may be allergic to them, takes Zyrtec.  She  reports being very sedentary.  Reports wheezing, gets winded when she bends over, has dyspnea on exertion, seasonal allergies.  Denies cough, shortness of breath at rest and chest tightness.  Reports having GERD, takes pantoprazole as needed, states when she chews gum it relieves her GERD.    Previous pulmonary history: COPD/ Asthma - adult, pneumonia 2 years    Inhalers/nebulized medications: breztri, albuterol, duo nebs    Hospitalization History: She has not been hospitalized over the last year for breathing related problem.    Sleep history: JENI      ALLERGIES AND MEDICATIONS     ALLERGIES  Allergies   Allergen Reactions    Lisinopril Anaphylaxis    Thiopental Shortness of breath    Vancomycin Shortness of breath    Cefuroxime Rash    Erythromycin Swelling and Rash    Linezolid Itching and Swelling    Novocain [Procaine] Palpitations     Heart racing with dental procedure    Silver Sulfadiazine Swelling and Rash       MEDICATIONS  Current Outpatient Medications   Medication Sig Dispense Refill    albuterol 90 mcg/actuation inhaler Inhale 2 puffs every 4 hours if needed for wheezing or shortness of breath (cough). 18 g 11    atorvastatin (Lipitor) 40 mg tablet Take 1 tablet (40 mg) by mouth once daily. 90 tablet 3    budesonide-glycopyr-formoterol (Breztri Aerosphere) 160-9-4.8 mcg/actuation HFA aerosol inhaler Inhale 2 puffs 2 times a day. Rinse mouth with water after use to reduce aftertaste and incidence of candidiasis. Do not swallow. 10.7 g 6    clobetasol (Olux) 0.05 % topical foam Apply topically 2 times a day as needed (scalp psoriasis). 100 g 5    compression socks, large misc 2 each once daily. 2 each 0    cycloSPORINE 0.05 % drops Administer 1 drop into both eyes 2 times a day.      dextromethorphan-guaifenesin (Robitussin DM)  mg/5 mL oral liquid Take 5 mL by mouth every 4 hours if needed for cough. 118 mL 0    fluconazole (Diflucan) 150 mg tablet Take 1 tablet (150 mg) by mouth once daily.       fluocinolone (DermOtic) 0.01 % ear drops Administer 5 drops into each ear 2 times a day. 20 mL 0    furosemide (Lasix) 40 mg tablet Take 1 tablet (40 mg) by mouth once daily. 30 tablet 0    ipratropium-albuteroL (Duo-Neb) 0.5-2.5 mg/3 mL nebulizer solution Take 3 mL by nebulization every 4 hours if needed for wheezing or shortness of breath. 360 mL 3    ketoconazole (NIZOral) 2 % shampoo Apply 1 Application topically 2 times a week.      levothyroxine (Synthroid, Levoxyl) 50 mcg tablet Take 1 tablet (50 mcg) by mouth once daily. For 6 days a week omit Sunday      lubricating eye drops ophthalmic solution Administer 1 drop into both eyes if needed for dry eyes.      metoprolol succinate XL (Toprol-XL) 50 mg 24 hr tablet Take 1 tablet (50 mg) by mouth once daily at bedtime. 90 tablet 3    predniSONE (Deltasone) 20 mg tablet Take 2 tablets (40 mg) by mouth once daily for 3 doses. 6 tablet 0    rivaroxaban (Xarelto) 20 mg tablet Take 1 tablet (20 mg) by mouth once daily in the evening. Take with meals. 90 tablet 3    spironolactone (Aldactone) 25 mg tablet Take 1 tablet (25 mg) by mouth once daily. 90 tablet 3     No current facility-administered medications for this visit.         PAST HISTORY     PAST MEDICAL HISTORY  Asthma  COPD  GERD  JENI  Paroxysmal A-fib  Cardiomyopathy  HFpEF  Hypothyroidism  Hyperlipidemia  Hypertension    PAST SURGICAL HISTORY  No past surgical history on file.    IMMUNIZATION HISTORY  Immunization History   Administered Date(s) Administered    Flu vaccine, quadrivalent, high-dose, preservative free, age 65y+ (FLUZONE) 10/19/2020, 01/12/2022, 11/17/2022, 11/28/2023    Flu vaccine, trivalent, preservative free, HIGH-DOSE, age 65y+ (Fluzone) 01/23/2018, 01/09/2020, 02/10/2025    Influenza, Unspecified 11/26/2008, 11/10/2011    Influenza, injectable, quadrivalent 11/19/2015    Influenza, seasonal, injectable 11/19/2013, 10/23/2014, 10/07/2015    Moderna COVID-19 vaccine, 12 years and older  (50mcg/0.5mL)(Spikevax) 10/16/2023    Moderna COVID-19 vaccine, bivalent, blue cap/gray label *Check age/dose* 01/12/2023    Moderna SARS-CoV-2 Vaccination 03/03/2021, 04/07/2021, 12/06/2021, 04/22/2022    Td vaccine, age 7 years and older (TDVAX) 02/08/2010    Tdap vaccine, age 7 year and older (BOOSTRIX, ADACEL) 11/09/2015, 10/26/2022       SOCIAL HISTORY  Tobacco Smoking: never  Smokeless Tobacco/Vaping: none  Illicit drugs: none  Alcohol consumption: none  Pets: dog - may have allergies    OCCUPATIONAL/ENVIRONMENTAL HISTORY  Occupation: Teacher at School with asbestos.  Unknown exposure to asbestos, silica, beryllium or inhaled metals.  No exposure to birds or exotic animals.    FAMILY HISTORY  No family history of pulmonary disease.  Maternal Aunt Breast cancer   No family history of autoimmune disorders.    REVIEW OF SYSTEMS     REVIEW OF SYSTEMS  Review of Systems    Constitutional: No fever, no chills, no night sweats.    Eyes: No double vision, no floaters, no dry eyes.   ENT: See HPI.   Neck: No neck stiffness.  Cardiovascular: No sharp chest pain, no heart racing, no leg swelling.  Respiratory: as noted in HPI.   Gastrointestinal: No nausea, no vomiting, no diarrhea.   Musculoskeletal: No joint pain, no back pain.   Integumentary: No rashes or sores.  Neurological: No dizziness, no headaches. Sleeping well.  Psychiatric: No mood changes.   Endocrine: No hot flashes, no cold intolerance, weight is stable.  Hematologic: No easy bruising or bleeding.    PHYSICAL EXAM     VITAL SIGNS:   Vitals:    05/22/25 1307   BP: 118/70   Pulse: 83   Temp: 36.2 °C (97.1 °F)   SpO2: (!) 89%         CURRENT WEIGHT: Body mass index is 43.72 kg/m².      PREVIOUS WEIGHTS:  Wt Readings from Last 3 Encounters:   05/22/25 120 kg (265 lb)   05/18/25 120 kg (265 lb 3.4 oz)   05/08/25 117 kg (257 lb 15 oz)       Physical Exam    Constitutional: General appearance: Alert and oriented.  No acute distress. Well developed, well  nourished.  Head and face: Symmetric  ENT: external inspection of ear and nose normal. No intranasal polyps. No oropharyngeal exudates.    Oropharynx: normal   Neck: supple, no lymphadenopathy  Pulmonary: Chest is normal. No increased work of breathing or signs of respiratory distress. Clear to auscultation bilaterally - no crackles, wheezing, or rhonchi.   Cardiovascular: Heart rate and rhythm normal. Normal S1, S2 - no murmurs, gallops, or pericardial rub.   Abdomen: Soft, non tender, +BS  Extremities: No edema. No clubbing or cyanosis of the fingernails.    Neurologic: Moves all four extremities   MSK: Normal movements of extremities. Gait normal   Psychiatric: Intact judgement and insight.    RESULTS/DATA     Pulmonary Function Test Results                   Chest Radiograph     XR chest 1 view 08/27/2023    Narrative  EXAMINATION:  ONE XRAY VIEW OF THE CHEST    8/27/2023 12:11 pm    COMPARISON:  None.    HISTORY:  ORDERING SYSTEM PROVIDED HISTORY: CP  TECHNOLOGIST PROVIDED HISTORY:  Reason for exam:->CP  Reason for Exam: cp    FINDINGS:  Medical devices: Loop recorder or other medical device overlying the left  hemithorax.    Mediastinum/Heart: The mediastinal contours are normal. The heart appears  normal in size.    Lungs: Mild opacification of the left retrocardiac and left lung base.  Right  basilar atelectasis.    Pleura: No pleural effusion. No pneumothorax.    Impression  Bibasilar opacification, left side greater than right, may represent  atelectasis or pneumonia.      Chest CT Scan     No results found     Echocardiogram     No results found        Labwork     Lab Results   Component Value Date    WBC 2.4 (L) 05/19/2025    HGB 14.1 05/19/2025    HCT 45.2 05/19/2025    MCV 90 05/19/2025     05/19/2025      Lab Results   Component Value Date    GLUCOSE 168 (H) 05/19/2025    CALCIUM 10.2 05/19/2025     (L) 05/19/2025    K 4.4 05/19/2025    CO2 30 05/19/2025    CL 98 05/19/2025    BUN 22  "05/19/2025    CREATININE 0.94 05/19/2025      Lab Results   Component Value Date    ALT 10 05/18/2025    AST 23 05/18/2025    ALKPHOS 85 05/18/2025    BILITOT 0.8 05/18/2025        Protime   Date/Time Value Ref Range Status   12/19/2023 02:29 PM 17.0 (H) 9.8 - 12.8 seconds Final     INR   Date/Time Value Ref Range Status   12/19/2023 02:29 PM 1.5 (H) 0.9 - 1.1 Final       No results found for: \"ICIGE\", \"IGE\", \"ICA04\", \"ASPFU\", \"IGG\", \"IGA\", \"IGM\"    Peripheral Eosinophil Count/Percentage:   Eosinophils Absolute (x10*3/uL)   Date Value   05/19/2025 0.00     Eosinophils % (%)   Date Value   05/19/2025 0.0       ASSESSMENT/PLAN   Ms. Johnston is a 74 y.o. female, with a history of mild intermittent asthma, COPD, JENI on CPAP, paroxysmal atrial fibrillation, idiopathic cardiomyopathy/HFpEF, GERD, hypothyroidism, HLD, HTN who is a never smoker, who presents to a [facility] for an initial evaluation for asthma/COPD.     Problem List and Orders  Problem List Items Addressed This Visit    None        Assessment and Plan / Recommendations:  Problem List Items Addressed This Visit    None     COPD/Asthma; moderate obstruction, air trapping +BD response  - Continue Breztri 2 puffs BID - rinse mouth after each use  - continue albuterol HFA or albuterol nebulizers every 4-6 hours as needed for shortness of breath    COPD exacerbation  -Start doxycycline for 10 days  -Start prednisone taper    Hypoxia  - Continue to wean oxygen as tolerated to maintain an SpO2 of 90% or greater    JENI  - Continue to wear CPAP and follow with sleep medicine    Follow up in 2 months or sooner if needed.    If you have any questions please call the office [phone]    Thank you for visiting the Pulmonary clinic today!   Kimmy Mcwilliams CNP  [phone]   "

## 2025-05-25 ENCOUNTER — APPOINTMENT (OUTPATIENT)
Dept: CARDIOLOGY | Facility: HOSPITAL | Age: 75
End: 2025-05-25
Payer: MEDICARE

## 2025-05-25 ENCOUNTER — APPOINTMENT (OUTPATIENT)
Dept: RADIOLOGY | Facility: HOSPITAL | Age: 75
End: 2025-05-25
Payer: MEDICARE

## 2025-05-25 ENCOUNTER — HOSPITAL ENCOUNTER (EMERGENCY)
Facility: HOSPITAL | Age: 75
Discharge: HOME | End: 2025-05-26
Attending: STUDENT IN AN ORGANIZED HEALTH CARE EDUCATION/TRAINING PROGRAM
Payer: MEDICARE

## 2025-05-25 DIAGNOSIS — J18.9 PNEUMONIA OF BOTH LOWER LOBES DUE TO INFECTIOUS ORGANISM: Primary | ICD-10-CM

## 2025-05-25 LAB
ALBUMIN SERPL BCP-MCNC: 3.8 G/DL (ref 3.4–5)
ALP SERPL-CCNC: 56 U/L (ref 33–136)
ALT SERPL W P-5'-P-CCNC: 20 U/L (ref 7–45)
ANION GAP BLDV CALCULATED.4IONS-SCNC: 6 MMOL/L (ref 10–25)
ANION GAP SERPL CALC-SCNC: 13 MMOL/L (ref 10–20)
AST SERPL W P-5'-P-CCNC: 49 U/L (ref 9–39)
BASE EXCESS BLDV CALC-SCNC: 9.7 MMOL/L (ref -2–3)
BASOPHILS # BLD AUTO: 0.02 X10*3/UL (ref 0–0.1)
BASOPHILS NFR BLD AUTO: 0.2 %
BILIRUB SERPL-MCNC: 0.7 MG/DL (ref 0–1.2)
BNP SERPL-MCNC: 52 PG/ML (ref 0–99)
BODY TEMPERATURE: ABNORMAL
BUN SERPL-MCNC: 32 MG/DL (ref 6–23)
CA-I BLDV-SCNC: 1.36 MMOL/L (ref 1.1–1.33)
CALCIUM SERPL-MCNC: 10.4 MG/DL (ref 8.6–10.3)
CARDIAC TROPONIN I PNL SERPL HS: 20 NG/L (ref 0–13)
CARDIAC TROPONIN I PNL SERPL HS: 21 NG/L (ref 0–13)
CHLORIDE BLDV-SCNC: 91 MMOL/L (ref 98–107)
CHLORIDE SERPL-SCNC: 94 MMOL/L (ref 98–107)
CO2 SERPL-SCNC: 33 MMOL/L (ref 21–32)
CREAT SERPL-MCNC: 1.11 MG/DL (ref 0.5–1.05)
EGFRCR SERPLBLD CKD-EPI 2021: 53 ML/MIN/1.73M*2
EOSINOPHIL # BLD AUTO: 0.08 X10*3/UL (ref 0–0.4)
EOSINOPHIL NFR BLD AUTO: 1 %
ERYTHROCYTE [DISTWIDTH] IN BLOOD BY AUTOMATED COUNT: 13.5 % (ref 11.5–14.5)
GLUCOSE BLDV-MCNC: 111 MG/DL (ref 74–99)
GLUCOSE SERPL-MCNC: 116 MG/DL (ref 74–99)
HCO3 BLDV-SCNC: 36.8 MMOL/L (ref 22–26)
HCT VFR BLD AUTO: 48 % (ref 36–46)
HCT VFR BLD EST: 45 % (ref 36–46)
HGB BLD-MCNC: 14.9 G/DL (ref 12–16)
HGB BLDV-MCNC: 15 G/DL (ref 12–16)
IMM GRANULOCYTES # BLD AUTO: 0.05 X10*3/UL (ref 0–0.5)
IMM GRANULOCYTES NFR BLD AUTO: 0.6 % (ref 0–0.9)
INHALED O2 CONCENTRATION: 28 %
INR PPP: 2.9 (ref 0.9–1.1)
LACTATE BLDV-SCNC: 1.9 MMOL/L (ref 0.4–2)
LYMPHOCYTES # BLD AUTO: 1.43 X10*3/UL (ref 0.8–3)
LYMPHOCYTES NFR BLD AUTO: 17.7 %
MCH RBC QN AUTO: 28 PG (ref 26–34)
MCHC RBC AUTO-ENTMCNC: 31 G/DL (ref 32–36)
MCV RBC AUTO: 90 FL (ref 80–100)
MONOCYTES # BLD AUTO: 0.65 X10*3/UL (ref 0.05–0.8)
MONOCYTES NFR BLD AUTO: 8 %
NEUTROPHILS # BLD AUTO: 5.86 X10*3/UL (ref 1.6–5.5)
NEUTROPHILS NFR BLD AUTO: 72.5 %
NRBC BLD-RTO: 0 /100 WBCS (ref 0–0)
OXYHGB MFR BLDV: 54.7 % (ref 45–75)
PCO2 BLDV: 58 MM HG (ref 41–51)
PH BLDV: 7.41 PH (ref 7.33–7.43)
PLATELET # BLD AUTO: 239 X10*3/UL (ref 150–450)
PO2 BLDV: 35 MM HG (ref 35–45)
POTASSIUM BLDV-SCNC: 3.9 MMOL/L (ref 3.5–5.3)
POTASSIUM SERPL-SCNC: 3.9 MMOL/L (ref 3.5–5.3)
POTASSIUM SERPL-SCNC: 6.5 MMOL/L (ref 3.5–5.3)
PROT SERPL-MCNC: 8.2 G/DL (ref 6.4–8.2)
PROTHROMBIN TIME: 32.1 SECONDS (ref 9.8–12.4)
RBC # BLD AUTO: 5.33 X10*6/UL (ref 4–5.2)
SAO2 % BLDV: 56 % (ref 45–75)
SODIUM BLDV-SCNC: 130 MMOL/L (ref 136–145)
SODIUM SERPL-SCNC: 133 MMOL/L (ref 136–145)
WBC # BLD AUTO: 8.1 X10*3/UL (ref 4.4–11.3)

## 2025-05-25 PROCEDURE — 85025 COMPLETE CBC W/AUTO DIFF WBC: CPT | Performed by: EMERGENCY MEDICINE

## 2025-05-25 PROCEDURE — 83880 ASSAY OF NATRIURETIC PEPTIDE: CPT | Performed by: STUDENT IN AN ORGANIZED HEALTH CARE EDUCATION/TRAINING PROGRAM

## 2025-05-25 PROCEDURE — 85610 PROTHROMBIN TIME: CPT | Performed by: STUDENT IN AN ORGANIZED HEALTH CARE EDUCATION/TRAINING PROGRAM

## 2025-05-25 PROCEDURE — 2500000002 HC RX 250 W HCPCS SELF ADMINISTERED DRUGS (ALT 637 FOR MEDICARE OP, ALT 636 FOR OP/ED)

## 2025-05-25 PROCEDURE — 84484 ASSAY OF TROPONIN QUANT: CPT | Performed by: STUDENT IN AN ORGANIZED HEALTH CARE EDUCATION/TRAINING PROGRAM

## 2025-05-25 PROCEDURE — 94640 AIRWAY INHALATION TREATMENT: CPT

## 2025-05-25 PROCEDURE — 93005 ELECTROCARDIOGRAM TRACING: CPT

## 2025-05-25 PROCEDURE — 71045 X-RAY EXAM CHEST 1 VIEW: CPT | Performed by: RADIOLOGY

## 2025-05-25 PROCEDURE — 83880 ASSAY OF NATRIURETIC PEPTIDE: CPT | Performed by: EMERGENCY MEDICINE

## 2025-05-25 PROCEDURE — 71045 X-RAY EXAM CHEST 1 VIEW: CPT

## 2025-05-25 PROCEDURE — 36415 COLL VENOUS BLD VENIPUNCTURE: CPT | Performed by: EMERGENCY MEDICINE

## 2025-05-25 PROCEDURE — 85025 COMPLETE CBC W/AUTO DIFF WBC: CPT | Performed by: STUDENT IN AN ORGANIZED HEALTH CARE EDUCATION/TRAINING PROGRAM

## 2025-05-25 PROCEDURE — 84132 ASSAY OF SERUM POTASSIUM: CPT | Performed by: STUDENT IN AN ORGANIZED HEALTH CARE EDUCATION/TRAINING PROGRAM

## 2025-05-25 PROCEDURE — 96365 THER/PROPH/DIAG IV INF INIT: CPT

## 2025-05-25 PROCEDURE — 80053 COMPREHEN METABOLIC PANEL: CPT | Performed by: STUDENT IN AN ORGANIZED HEALTH CARE EDUCATION/TRAINING PROGRAM

## 2025-05-25 PROCEDURE — 84484 ASSAY OF TROPONIN QUANT: CPT | Performed by: EMERGENCY MEDICINE

## 2025-05-25 PROCEDURE — 85610 PROTHROMBIN TIME: CPT | Performed by: EMERGENCY MEDICINE

## 2025-05-25 PROCEDURE — 2500000004 HC RX 250 GENERAL PHARMACY W/ HCPCS (ALT 636 FOR OP/ED): Mod: JZ

## 2025-05-25 PROCEDURE — 80053 COMPREHEN METABOLIC PANEL: CPT | Performed by: EMERGENCY MEDICINE

## 2025-05-25 PROCEDURE — 96375 TX/PRO/DX INJ NEW DRUG ADDON: CPT

## 2025-05-25 PROCEDURE — 84132 ASSAY OF SERUM POTASSIUM: CPT | Mod: 59

## 2025-05-25 PROCEDURE — 36415 COLL VENOUS BLD VENIPUNCTURE: CPT | Performed by: STUDENT IN AN ORGANIZED HEALTH CARE EDUCATION/TRAINING PROGRAM

## 2025-05-25 PROCEDURE — 99285 EMERGENCY DEPT VISIT HI MDM: CPT | Performed by: STUDENT IN AN ORGANIZED HEALTH CARE EDUCATION/TRAINING PROGRAM

## 2025-05-25 RX ORDER — MAGNESIUM SULFATE HEPTAHYDRATE 40 MG/ML
2 INJECTION, SOLUTION INTRAVENOUS ONCE
Status: COMPLETED | OUTPATIENT
Start: 2025-05-25 | End: 2025-05-26

## 2025-05-25 RX ORDER — IPRATROPIUM BROMIDE AND ALBUTEROL SULFATE 2.5; .5 MG/3ML; MG/3ML
3 SOLUTION RESPIRATORY (INHALATION) EVERY 20 MIN
Status: COMPLETED | OUTPATIENT
Start: 2025-05-25 | End: 2025-05-25

## 2025-05-25 RX ADMIN — MAGNESIUM SULFATE HEPTAHYDRATE 2 G: 40 INJECTION, SOLUTION INTRAVENOUS at 23:28

## 2025-05-25 RX ADMIN — IPRATROPIUM BROMIDE AND ALBUTEROL SULFATE 3 ML: 2.5; .5 SOLUTION RESPIRATORY (INHALATION) at 23:47

## 2025-05-25 RX ADMIN — IPRATROPIUM BROMIDE AND ALBUTEROL SULFATE 3 ML: 2.5; .5 SOLUTION RESPIRATORY (INHALATION) at 23:46

## 2025-05-25 RX ADMIN — IPRATROPIUM BROMIDE AND ALBUTEROL SULFATE 3 ML: 2.5; .5 SOLUTION RESPIRATORY (INHALATION) at 23:28

## 2025-05-25 RX ADMIN — METHYLPREDNISOLONE SODIUM SUCCINATE 125 MG: 125 INJECTION, POWDER, FOR SOLUTION INTRAMUSCULAR; INTRAVENOUS at 23:28

## 2025-05-25 ASSESSMENT — PAIN SCALES - GENERAL: PAINLEVEL_OUTOF10: 5 - MODERATE PAIN

## 2025-05-25 ASSESSMENT — LIFESTYLE VARIABLES
HAVE YOU EVER FELT YOU SHOULD CUT DOWN ON YOUR DRINKING: NO
EVER FELT BAD OR GUILTY ABOUT YOUR DRINKING: NO
HAVE PEOPLE ANNOYED YOU BY CRITICIZING YOUR DRINKING: NO
EVER HAD A DRINK FIRST THING IN THE MORNING TO STEADY YOUR NERVES TO GET RID OF A HANGOVER: NO
TOTAL SCORE: 0

## 2025-05-25 ASSESSMENT — PAIN DESCRIPTION - PAIN TYPE: TYPE: ACUTE PAIN

## 2025-05-25 ASSESSMENT — PAIN DESCRIPTION - ORIENTATION: ORIENTATION: LEFT

## 2025-05-25 ASSESSMENT — PAIN - FUNCTIONAL ASSESSMENT: PAIN_FUNCTIONAL_ASSESSMENT: 0-10

## 2025-05-25 ASSESSMENT — PAIN DESCRIPTION - LOCATION: LOCATION: ARM

## 2025-05-26 ENCOUNTER — APPOINTMENT (OUTPATIENT)
Dept: CARDIOLOGY | Facility: HOSPITAL | Age: 75
End: 2025-05-26
Payer: MEDICARE

## 2025-05-26 ENCOUNTER — APPOINTMENT (OUTPATIENT)
Dept: RADIOLOGY | Facility: HOSPITAL | Age: 75
End: 2025-05-26
Payer: MEDICARE

## 2025-05-26 VITALS
HEART RATE: 77 BPM | DIASTOLIC BLOOD PRESSURE: 70 MMHG | HEIGHT: 62 IN | OXYGEN SATURATION: 93 % | RESPIRATION RATE: 20 BRPM | TEMPERATURE: 97.3 F | BODY MASS INDEX: 45.27 KG/M2 | SYSTOLIC BLOOD PRESSURE: 126 MMHG | WEIGHT: 246 LBS

## 2025-05-26 LAB
APPEARANCE UR: CLEAR
ATRIAL RATE: 107 BPM
ATRIAL RATE: 88 BPM
BILIRUB UR STRIP.AUTO-MCNC: NEGATIVE MG/DL
COLOR UR: YELLOW
GLUCOSE UR STRIP.AUTO-MCNC: NORMAL MG/DL
HOLD SPECIMEN: NORMAL
KETONES UR STRIP.AUTO-MCNC: NEGATIVE MG/DL
LEUKOCYTE ESTERASE UR QL STRIP.AUTO: NEGATIVE
NITRITE UR QL STRIP.AUTO: NEGATIVE
P AXIS: 57 DEGREES
P OFFSET: 125 MS
P ONSET: 84 MS
PH UR STRIP.AUTO: 5 [PH]
PR INTERVAL: 250 MS
PROT UR STRIP.AUTO-MCNC: NEGATIVE MG/DL
Q ONSET: 209 MS
Q ONSET: 210 MS
QRS COUNT: 14 BEATS
QRS COUNT: 16 BEATS
QRS DURATION: 90 MS
QRS DURATION: 92 MS
QT INTERVAL: 348 MS
QT INTERVAL: 350 MS
QTC CALCULATION(BAZETT): 421 MS
QTC CALCULATION(BAZETT): 446 MS
QTC FREDERICIA: 395 MS
QTC FREDERICIA: 412 MS
R AXIS: -24 DEGREES
R AXIS: -28 DEGREES
RBC # UR STRIP.AUTO: NEGATIVE MG/DL
SP GR UR STRIP.AUTO: 1.03
T AXIS: 45 DEGREES
T AXIS: 54 DEGREES
T OFFSET: 383 MS
T OFFSET: 385 MS
UROBILINOGEN UR STRIP.AUTO-MCNC: NORMAL MG/DL
VENTRICULAR RATE: 88 BPM
VENTRICULAR RATE: 98 BPM

## 2025-05-26 PROCEDURE — 74177 CT ABD & PELVIS W/CONTRAST: CPT

## 2025-05-26 PROCEDURE — 81003 URINALYSIS AUTO W/O SCOPE: CPT

## 2025-05-26 PROCEDURE — 74177 CT ABD & PELVIS W/CONTRAST: CPT | Performed by: RADIOLOGY

## 2025-05-26 PROCEDURE — 2500000004 HC RX 250 GENERAL PHARMACY W/ HCPCS (ALT 636 FOR OP/ED): Mod: JZ

## 2025-05-26 PROCEDURE — 2550000001 HC RX 255 CONTRASTS: Mod: JZ | Performed by: STUDENT IN AN ORGANIZED HEALTH CARE EDUCATION/TRAINING PROGRAM

## 2025-05-26 PROCEDURE — 96367 TX/PROPH/DG ADDL SEQ IV INF: CPT

## 2025-05-26 PROCEDURE — 93005 ELECTROCARDIOGRAM TRACING: CPT

## 2025-05-26 RX ORDER — LEVOFLOXACIN 5 MG/ML
500 INJECTION, SOLUTION INTRAVENOUS ONCE
Status: COMPLETED | OUTPATIENT
Start: 2025-05-26 | End: 2025-05-26

## 2025-05-26 RX ORDER — VANCOMYCIN HYDROCHLORIDE 1 G/20ML
INJECTION, POWDER, LYOPHILIZED, FOR SOLUTION INTRAVENOUS DAILY PRN
Status: DISCONTINUED | OUTPATIENT
Start: 2025-05-26 | End: 2025-05-26

## 2025-05-26 RX ORDER — AMOXICILLIN AND CLAVULANATE POTASSIUM 875; 125 MG/1; MG/1
1 TABLET, FILM COATED ORAL EVERY 12 HOURS
Qty: 14 TABLET | Refills: 0 | Status: SHIPPED | OUTPATIENT
Start: 2025-05-26 | End: 2025-06-02

## 2025-05-26 RX ADMIN — LEVOFLOXACIN 500 MG: 500 INJECTION, SOLUTION INTRAVENOUS at 02:54

## 2025-05-26 RX ADMIN — IOHEXOL 75 ML: 350 INJECTION, SOLUTION INTRAVENOUS at 02:34

## 2025-05-26 NOTE — DISCHARGE INSTRUCTIONS
Continue to take your doxycycline.  At the Augmentin in addition to his doxycycline.  If your symptoms worsen or you have any questions or concerns, please return to the emergency department.  Follow-up with your primary care doctor in 2 to 3 days.

## 2025-05-26 NOTE — ED PROVIDER NOTES
EMERGENCY DEPARTMENT ENCOUNTER      Pt Name: Helen Johnston  MRN: [id number]  Birthdate 1950  Date of evaluation: 5/25/2025  Provider: Boy Coronado MD    CHIEF COMPLAINT       Chief Complaint   Patient presents with    Shortness of Breath     Pt has had shortness of breath since Tuesday; pt has had to wear 2L O2 since then, states pulse ox was 88-91%; pt has history of copd. Endorses cough. Pt was in hospital recently; states she was on prednisone and an abx for bronchitis. Pt has hx a fib on eliquis    Cough     HISTORY OF PRESENT ILLNESS    HPI  74-year-old female presents emergency department chief complaint of shortness of breath and cough.  Patient was recently seen in the emergency department and discharged with oxygen and antibiotics.  She was evaluated here initially for COPD exacerbation she appears today to be having the same COPD exacerbation once more.  Patient is on Eliquis for a past medical history of A-fib.  Nursing Notes were reviewed.    PAST MEDICAL HISTORY   Medical History[1]      SURGICAL HISTORY     Surgical History[2]      CURRENT MEDICATIONS       Previous Medications    ALBUTEROL 90 MCG/ACTUATION INHALER    Inhale 2 puffs every 4 hours if needed for wheezing or shortness of breath (cough).    ATORVASTATIN (LIPITOR) 40 MG TABLET    Take 1 tablet (40 mg) by mouth once daily.    BUDESONIDE-GLYCOPYR-FORMOTEROL (BREZTRI AEROSPHERE) 160-9-4.8 MCG/ACTUATION HFA AEROSOL INHALER    Inhale 2 puffs 2 times a day. Rinse mouth with water after use to reduce aftertaste and incidence of candidiasis. Do not swallow.    CLOBETASOL (OLUX) 0.05 % TOPICAL FOAM    Apply topically 2 times a day as needed (scalp psoriasis).    COMPRESSION SOCKS, LARGE MISC    2 each once daily.    CYCLOSPORINE 0.05 % DROPS    Administer 1 drop into both eyes 2 times a day.    DEXTROMETHORPHAN-GUAIFENESIN (ROBITUSSIN DM)  MG/5 ML ORAL LIQUID    Take 5 mL by mouth every 4 hours if needed for cough.    DOXYCYCLINE  (VIBRA-TABS) 100 MG TABLET    Take 1 tablet (100 mg) by mouth 2 times a day for 10 days. Take with a full glass of water and do not lie down for at least 30 minutes after.    FLUOCINOLONE (DERMOTIC) 0.01 % EAR DROPS    Administer 5 drops into each ear 2 times a day.    FUROSEMIDE (LASIX) 40 MG TABLET    Take 1 tablet (40 mg) by mouth once daily.    IPRATROPIUM-ALBUTEROL (DUO-NEB) 0.5-2.5 MG/3 ML NEBULIZER SOLUTION    Take 3 mL by nebulization every 4 hours if needed for wheezing or shortness of breath.    KETOCONAZOLE (NIZORAL) 2 % SHAMPOO    Apply 1 Application topically 2 times a week.    LEVOTHYROXINE (SYNTHROID, LEVOXYL) 50 MCG TABLET    Take 1 tablet (50 mcg) by mouth once daily. For 6 days a week omit Sunday    LUBRICATING EYE DROPS OPHTHALMIC SOLUTION    Administer 1 drop into both eyes if needed for dry eyes.    METOPROLOL SUCCINATE XL (TOPROL-XL) 50 MG 24 HR TABLET    Take 1 tablet (50 mg) by mouth once daily at bedtime.    PREDNISONE (DELTASONE) 10 MG TABLET    Take 4 tabs (40mg) daily for 3 days, then 3 tabs (30mg) daily for 3 days,  2 tabs (20mg) daily for 3 days,  1 tab (10 mg) daily for 3 days.    RIVAROXABAN (XARELTO) 20 MG TABLET    Take 1 tablet (20 mg) by mouth once daily in the evening. Take with meals.    SPIRONOLACTONE (ALDACTONE) 25 MG TABLET    Take 1 tablet (25 mg) by mouth once daily.       ALLERGIES     Lisinopril, Thiopental, Vancomycin, Cefuroxime, Erythromycin, Linezolid, Novocain [procaine], and Silver sulfadiazine    FAMILY HISTORY     Family History[3]       SOCIAL HISTORY     Social History[4]    SCREENINGS                        PHYSICAL EXAM    (up to 7 for level 4, 8 or more for level 5)     ED Triage Vitals   Temperature Heart Rate Respirations BP   05/25/25 2100 05/25/25 2100 05/25/25 2100 05/25/25 2113   36.3 °C (97.3 °F) (!) 107 18 117/64      Pulse Ox Temp src Heart Rate Source Patient Position   05/25/25 2100 -- 05/25/25 2100 --   94 %  Monitor       BP Location FiO2 (%)      -- --             Physical Exam  Constitutional:       Appearance: She is well-developed.   HENT:      Head: Normocephalic and atraumatic.      Mouth/Throat:      Mouth: Mucous membranes are moist.      Pharynx: Oropharynx is clear.   Eyes:      Extraocular Movements: Extraocular movements intact.      Pupils: Pupils are equal, round, and reactive to light.   Cardiovascular:      Rate and Rhythm: Normal rate and regular rhythm.   Pulmonary:      Effort: Pulmonary effort is normal.      Breath sounds: Normal breath sounds.   Abdominal:      Palpations: Abdomen is soft.   Musculoskeletal:         General: Normal range of motion.      Cervical back: Normal range of motion.   Skin:     General: Skin is warm.      Capillary Refill: Capillary refill takes less than 2 seconds.   Neurological:      General: No focal deficit present.      Mental Status: She is alert.   Psychiatric:         Mood and Affect: Mood normal.          DIAGNOSTIC RESULTS     LABS:  Labs Reviewed   CBC WITH AUTO DIFFERENTIAL - Abnormal       Result Value    WBC 8.1      nRBC 0.0      RBC 5.33 (*)     Hemoglobin 14.9      Hematocrit 48.0 (*)     MCV 90      MCH 28.0      MCHC 31.0 (*)     RDW 13.5      Platelets 239      Neutrophils % 72.5      Immature Granulocytes %, Automated 0.6      Lymphocytes % 17.7      Monocytes % 8.0      Eosinophils % 1.0      Basophils % 0.2      Neutrophils Absolute 5.86 (*)     Immature Granulocytes Absolute, Automated 0.05      Lymphocytes Absolute 1.43      Monocytes Absolute 0.65      Eosinophils Absolute 0.08      Basophils Absolute 0.02     COMPREHENSIVE METABOLIC PANEL - Abnormal    Glucose 116 (*)     Sodium 133 (*)     Potassium 6.5 (*)     Chloride 94 (*)     Bicarbonate 33 (*)     Anion Gap 13      Urea Nitrogen 32 (*)     Creatinine 1.11 (*)     eGFR 53 (*)     Calcium 10.4 (*)     Albumin 3.8      Alkaline Phosphatase 56      Total Protein 8.2      AST 49 (*)     Bilirubin, Total 0.7      ALT 20      PROTIME-INR - Abnormal    Protime 32.1 (*)     INR 2.9 (*)    SERIAL TROPONIN-INITIAL - Abnormal    Troponin I, High Sensitivity 20 (*)     Narrative:     Less than 99th percentile of normal range cutoff-  Female and children under 18 years old <14 ng/L; Male <21 ng/L: Negative  Repeat testing should be performed if clinically indicated.     Female and children under 18 years old 14-50 ng/L; Male 21-50 ng/L:  Consistent with possible cardiac damage and possible increased clinical   risk. Serial measurements may help to assess extent of myocardial damage.     >50 ng/L: Consistent with cardiac damage, increased clinical risk and  myocardial infarction. Serial measurements may help assess extent of   myocardial damage.      NOTE: Children less than 1 year old may have higher baseline troponin   levels and results should be interpreted in conjunction with the overall   clinical context.     NOTE: Troponin I testing is performed using a different   testing methodology at [facility] than at other   [facility]. Direct result comparisons should only   be made within the same method.   SERIAL TROPONIN, 1 HOUR - Abnormal    Troponin I, High Sensitivity 21 (*)     Narrative:     Less than 99th percentile of normal range cutoff-  Female and children under 18 years old <14 ng/L; Male <21 ng/L: Negative  Repeat testing should be performed if clinically indicated.     Female and children under 18 years old 14-50 ng/L; Male 21-50 ng/L:  Consistent with possible cardiac damage and possible increased clinical   risk. Serial measurements may help to assess extent of myocardial damage.     >50 ng/L: Consistent with cardiac damage, increased clinical risk and  myocardial infarction. Serial measurements may help assess extent of   myocardial damage.      NOTE: Children less than 1 year old may have higher baseline troponin   levels and results should be interpreted in conjunction with the overall   clinical context.      NOTE: Troponin I testing is performed using a different   testing methodology at [facility] than at other   system hospitals. Direct result comparisons should only   be made within the same method.   BLOOD GAS VENOUS FULL PANEL - Abnormal    POCT pH, Venous 7.41      POCT pCO2, Venous 58 (*)     POCT pO2, Venous 35      POCT SO2, Venous 56      POCT Oxy Hemoglobin, Venous 54.7      POCT Hematocrit Calculated, Venous 45.0      POCT Sodium, Venous 130 (*)     POCT Potassium, Venous 3.9      POCT Chloride, Venous 91 (*)     POCT Ionized Calicum, Venous 1.36 (*)     POCT Glucose, Venous 111 (*)     POCT Lactate, Venous 1.9      POCT Base Excess, Venous 9.7 (*)     POCT HCO3 Calculated, Venous 36.8 (*)     POCT Hemoglobin, Venous 15.0      POCT Anion Gap, Venous 6.0 (*)     Patient Temperature        FiO2 28     B-TYPE NATRIURETIC PEPTIDE - Normal    BNP 52      Narrative:        <100 pg/mL - Heart failure unlikely  100-299 pg/mL - Intermediate probability of acute heart                  failure exacerbation. Correlate with clinical                  context and patient history.    >=300 pg/mL - Heart Failure likely. Correlate with clinical                  context and patient history.    BNP testing is performed using different testing methodology at [facility] than at other [facility] hospitals. Direct result comparisons should only be made within the same method.      POTASSIUM - Normal    Potassium 3.9     TROPONIN SERIES- (INITIAL, 1 HR)    Narrative:     The following orders were created for panel order Troponin I Series, High Sensitivity (0, 1 HR).  Procedure                               Abnormality         Status                     ---------                               -----------         ------                     Troponin I, High Sensiti...[[id number]]  Abnormal            Final result               Troponin, High Sensitivi...[[id number]]  Abnormal            Final result                  Please view results for these tests on the individual orders.   URINALYSIS WITH REFLEX CULTURE AND MICROSCOPIC    Narrative:     The following orders were created for panel order Urinalysis with Reflex Culture and Microscopic.  Procedure                               Abnormality         Status                     ---------                               -----------         ------                     Urinalysis with Reflex C...[[id number]]                                                 Extra Urine Gray Tube[[id number]]                                                         Please view results for these tests on the individual orders.   URINALYSIS WITH REFLEX CULTURE AND MICROSCOPIC   EXTRA URINE GRAY TUBE       All other labs were within normal range or not returned as of this dictation.    Imaging  XR chest 1 view   Final Result   1. Increasing streaky bibasilar airspace opacities,   right-greater-than-left when compared to the prior exam. The findings   may reflect atelectasis or consolidation/pneumonia.        Signed by: Lon Zamora 5/25/2025 11:39 PM   Dictation workstation:   [id number]      CT abdomen pelvis w IV contrast    (Results Pending)        Procedures  Procedures     EMERGENCY DEPARTMENT COURSE/MDM:   Medical Decision Making  74-year-old female presents emergency department chief complaint of cough and shortness of breath.  Medical management treatment Emergency Department consist of basic labs as well as CT of the abdomen pelvis as the patient is endorsing abdominal pain chest x-ray is remarkable for-  IMPRESSION:  1. Increasing streaky bibasilar airspace opacities,  right-greater-than-left when compared to the prior exam. The findings  may reflect atelectasis or consolidation/pneumonia.    We have administered antibiotics Levaquin.  Patient will be signed out to the night resident pending pulse ox and CT abdomen pelvis she indicates that she is indifferent to admission versus discharge  home.          Patient and or family in agreement and understanding of treatment plan.  All questions answered.      I reviewed the case with the attending ED physician. The attending ED physician agrees with the plan. Patient and/or patient´s representative was counseled regarding labs, imaging, likely diagnosis, and plan. All questions were answered.    ED Medications administered this visit:    Medications   ipratropium-albuteroL (Duo-Neb) 0.5-2.5 mg/3 mL nebulizer solution 3 mL (3 mL nebulization Given 5/25/25 2347)   methylPREDNISolone sod succinate (SOLU-Medrol) injection 125 mg (125 mg intravenous Given 5/25/25 2328)   magnesium sulfate 2 g in sterile water for injection 50 mL (0 g intravenous Stopped 5/26/25 0038)       New Prescriptions from this visit:    New Prescriptions    No medications on file       Follow-up:  No follow-up provider specified.      Final Impression: No diagnosis found.      (Please note that portions of this note were completed with a voice recognition program.  Efforts were made to edit the dictations but occasionally words are mis-transcribed.)         [1]   Past Medical History:  Diagnosis Date    Atrial fib/flutter, transient (Multi)     CHF (congestive heart failure)     COPD (chronic obstructive pulmonary disease) (Multi)     Diabetes mellitus (Multi)    [2] History reviewed. No pertinent surgical history.  [3] No family history on file.  [4]   Social History  Socioeconomic History    Marital status: Single   Tobacco Use    Smoking status: Never     Passive exposure: Never   Vaping Use    Vaping status: Never Used   Substance and Sexual Activity    Alcohol use: Never     Comment: occassional    Drug use: Never    Sexual activity: Defer     Social Drivers of Health     Financial Resource Strain: Low Risk  (5/19/2025)    Overall Financial Resource Strain (CARDIA)     Difficulty of Paying Living Expenses: Not hard at all   Food Insecurity: No Food Insecurity (5/18/2025)    Hunger  Vital Sign     Worried About Running Out of Food in the Last Year: Never true     Ran Out of Food in the Last Year: Never true   Transportation Needs: No Transportation Needs (5/19/2025)    PRAPARE - Transportation     Lack of Transportation (Medical): No     Lack of Transportation (Non-Medical): No   Physical Activity: Inactive (5/18/2025)    Exercise Vital Sign     Days of Exercise per Week: 0 days     Minutes of Exercise per Session: 0 min   Stress: Stress Concern Present (5/18/2025)    [employer] [place] [employer] - Occupational Stress Questionnaire     Feeling of Stress : To some extent   Social Connections: Moderately Isolated (5/18/2025)    Social Connection and Isolation Panel [NHANES]     Frequency of Communication with Friends and Family: Three times a week     Frequency of Social Gatherings with Friends and Family: Once a week     Attends Jehovah's witness Services: Never     Active Member of Clubs or Organizations: Yes     Attends Club or Organization Meetings: More than 4 times per year     Marital Status: Never    Intimate Partner Violence: Not At Risk (5/18/2025)    Humiliation, Afraid, Rape, and Kick questionnaire     Fear of Current or Ex-Partner: No     Emotionally Abused: No     Physically Abused: No     Sexually Abused: No   Housing Stability: Low Risk  (5/19/2025)    Housing Stability Vital Sign     Unable to Pay for Housing in the Last Year: No     Number of Times Moved in the Last Year: 0     Homeless in the Last Year: No        Boy Coronado MD  Resident  05/26/25 0158

## 2025-05-27 ENCOUNTER — HOSPITAL ENCOUNTER (OUTPATIENT)
Dept: CARDIOLOGY | Facility: CLINIC | Age: 75
Discharge: HOME | End: 2025-05-27
Payer: MEDICARE

## 2025-05-27 DIAGNOSIS — Z95.818 IMPLANTABLE LOOP RECORDER PRESENT: ICD-10-CM

## 2025-05-27 DIAGNOSIS — I50.32 CHRONIC DIASTOLIC (CONGESTIVE) HEART FAILURE: Primary | ICD-10-CM

## 2025-05-27 DIAGNOSIS — J44.1 COPD EXACERBATION (MULTI): ICD-10-CM

## 2025-05-27 DIAGNOSIS — I48.0 PAROXYSMAL ATRIAL FIBRILLATION (MULTI): ICD-10-CM

## 2025-05-28 ENCOUNTER — TELEPHONE (OUTPATIENT)
Dept: PULMONOLOGY | Facility: HOSPITAL | Age: 75
End: 2025-05-28
Payer: MEDICARE

## 2025-05-28 DIAGNOSIS — I48.0 PAROXYSMAL ATRIAL FIBRILLATION (MULTI): ICD-10-CM

## 2025-05-28 NOTE — TELEPHONE ENCOUNTER
"Returned pt's call regarding her breathing. She stated she is \"fit to be tide\". She stated that she is still sob and coughing. She states that her oxygen level is running around 88% on 2L. During our conversation she was 93% on 2L. She went to the ED on 5/25/25 and was diagnosed with pneumonia. She was offered admission, but refused. She was instructed to finish the doxycycline she was previously taking and was also started on augmentin. She stated that she has not started the prednisone that Kimmy Mcwilliams CNP prescribed at the last visit. She stated that she is going to the [facility] in a couple weeks and is adamant that this gets taken care of so she can enjoy herself. This nurse explained to her that everyone heals differently and within different time frames and there is not a guarantee date that she will be better. She requested breathing techniques and exercises to help her lungs, which was discussed. This nurse instructed her to start taking the prednisone prescribed and to increase her oxygen to 3L if she is going to be doing any home exercises. She was also instructed to not push herself too much as it can prolong healing. She verbalized understanding to all topics discussed. Kimmy Mcwilliams CNP notified.   "

## 2025-05-30 ENCOUNTER — APPOINTMENT (OUTPATIENT)
Dept: PRIMARY CARE | Facility: CLINIC | Age: 75
End: 2025-05-30
Payer: MEDICARE

## 2025-05-30 VITALS
SYSTOLIC BLOOD PRESSURE: 128 MMHG | OXYGEN SATURATION: 92 % | RESPIRATION RATE: 16 BRPM | BODY MASS INDEX: 44.5 KG/M2 | HEART RATE: 99 BPM | WEIGHT: 241.8 LBS | TEMPERATURE: 94 F | HEIGHT: 62 IN | DIASTOLIC BLOOD PRESSURE: 80 MMHG

## 2025-05-30 DIAGNOSIS — I42.0 DILATED IDIOPATHIC CARDIOMYOPATHY (MULTI): ICD-10-CM

## 2025-05-30 DIAGNOSIS — G47.33 OSA (OBSTRUCTIVE SLEEP APNEA): ICD-10-CM

## 2025-05-30 DIAGNOSIS — J30.9 ALLERGIC RHINITIS, UNSPECIFIED SEASONALITY, UNSPECIFIED TRIGGER: ICD-10-CM

## 2025-05-30 DIAGNOSIS — J44.1 COPD EXACERBATION (MULTI): ICD-10-CM

## 2025-05-30 DIAGNOSIS — R60.9 DEPENDENT EDEMA: ICD-10-CM

## 2025-05-30 DIAGNOSIS — I50.32 CHRONIC DIASTOLIC (CONGESTIVE) HEART FAILURE: ICD-10-CM

## 2025-05-30 DIAGNOSIS — E66.813 OBESITY, CLASS III, BMI 40-49.9 (MORBID OBESITY): ICD-10-CM

## 2025-05-30 DIAGNOSIS — J96.01 ACUTE RESPIRATORY FAILURE WITH HYPOXIA: Primary | ICD-10-CM

## 2025-05-30 DIAGNOSIS — Z71.89 COMPLEX CARE COORDINATION: ICD-10-CM

## 2025-05-30 DIAGNOSIS — J18.9 COMMUNITY ACQUIRED PNEUMONIA, UNSPECIFIED LATERALITY: ICD-10-CM

## 2025-05-30 DIAGNOSIS — I48.0 PAROXYSMAL ATRIAL FIBRILLATION (MULTI): ICD-10-CM

## 2025-05-30 DIAGNOSIS — K21.9 GASTROESOPHAGEAL REFLUX DISEASE WITHOUT ESOPHAGITIS: ICD-10-CM

## 2025-05-30 PROBLEM — J44.89 COPD WITH ASTHMA (MULTI): Status: RESOLVED | Noted: 2024-12-19 | Resolved: 2025-05-30

## 2025-05-30 PROBLEM — E66.01 MORBID OBESITY (MULTI): Status: RESOLVED | Noted: 2022-09-14 | Resolved: 2025-05-30

## 2025-05-30 PROBLEM — Z51.81 VISIT FOR MONITORING TIKOSYN THERAPY: Status: RESOLVED | Noted: 2023-11-09 | Resolved: 2025-05-30

## 2025-05-30 PROBLEM — Z79.899 VISIT FOR MONITORING TIKOSYN THERAPY: Status: RESOLVED | Noted: 2023-11-09 | Resolved: 2025-05-30

## 2025-05-30 PROCEDURE — 99495 TRANSJ CARE MGMT MOD F2F 14D: CPT

## 2025-05-30 PROCEDURE — 3079F DIAST BP 80-89 MM HG: CPT

## 2025-05-30 PROCEDURE — 3074F SYST BP LT 130 MM HG: CPT

## 2025-05-30 PROCEDURE — 1159F MED LIST DOCD IN RCRD: CPT

## 2025-05-30 PROCEDURE — 3008F BODY MASS INDEX DOCD: CPT

## 2025-05-30 RX ORDER — RIVAROXABAN 20 MG/1
TABLET, FILM COATED ORAL
Qty: 90 TABLET | Refills: 3 | Status: SHIPPED | OUTPATIENT
Start: 2025-05-30

## 2025-05-30 RX ORDER — PANTOPRAZOLE SODIUM 40 MG/1
40 TABLET, DELAYED RELEASE ORAL DAILY
Qty: 90 TABLET | Refills: 3 | Status: SHIPPED | OUTPATIENT
Start: 2025-05-30 | End: 2026-05-25

## 2025-05-30 RX ORDER — FLUTICASONE PROPIONATE 50 MCG
2 SPRAY, SUSPENSION (ML) NASAL DAILY
Qty: 16 G | Refills: 11 | Status: SHIPPED | OUTPATIENT
Start: 2025-05-30 | End: 2026-05-30

## 2025-05-30 NOTE — ASSESSMENT & PLAN NOTE
Patient believes acid reflux could be contributing to her cough so we will start her on Protonix 40 mg daily

## 2025-05-30 NOTE — PROGRESS NOTES
"Patient: Helen Johnston  : 1950  PCP: Elsie Ziegler DO  MRN: [id number]  Program: Transitional Care Management  Status: Enrolled  Effective Dates: 2025 - present  Responsible Staff: Anaid Hayes RN  Social Drivers to be Addressed: Physical Activity, Social Connections, Stress    Chronic Care Management (CMS)  Status: Identified  Start Date: 2025  Responsible Staff: Vivienne Jim RN  Social Drivers to be Addressed: No information to display      Helen Johnston is a 74 y.o. female presenting today for follow-up after being discharged from the hospital 11 days ago. The main problem requiring admission was COPD. The discharge summary and/or Transitional Care Management documentation was reviewed. Medication reconciliation was performed as indicated via the \"Manohar as Reviewed\" timestamp.     Helen Johnston was contacted by Transitional Care Management services two days after her discharge.     Patient was admitted to the hospital from  - 2025.  She had a COPD exacerbation, acute hypoxemic respiratory failure, atrial arrhythmia.  Chest x-ray showed vascular congestion consistent with heart failure.  They resumed her home Lasix 40 mg and started on.  She was given steroids in the hospital and to go home with.  She was given Robitussin DM for cough.  Was sent home with supplemental oxygen, 2 to 2.5 L via nasal cannula as needed.    She went to the cardiologist on 2025 and no medication changes were made.    She went to the pulmonologist on 2025.  She was given doxycycline for 10 days and additional prednisone.    She went back to the ED on 2025.  At that time she had increasing cough and chest discomfort. Chest x-ray concerning for pneumonia. Added Augmentin to her current doxycycline to complete coverage for community acquired pneumonia.     Since her ED visit a few days ago, she is still having a significant cough.  She feels a lot of postnasal drip and mucus in her throat.  " "She does not feel that she can fully cough it out.  She still taking prednisone but she is not taking the full dose because she read that it could exacerbate her heart failure.  Doing breathing treatments at home with the nebulizer helps.  She is not taking the full dose of her water pills because it makes her pee too much.  She was confused on how she should be taking both of the antibiotics now that she is on 2 so she was only taking 1 Augmentin pill a day and 1 doxycycline a day even though they were both meant to be twice daily.  She was not using her Breztri every day because she thought albuterol with her maintenance inhaler and Breztri was meant to be as needed.    Objective   /80   Pulse 99   Temp 34.4 °C (94 °F)   Resp 16   Ht 1.575 m (5' 2\")   Wt 110 kg (241 lb 12.8 oz)   SpO2 92%   BMI 44.23 kg/m²    PHYSICAL EXAM  Gen: Well appearing, in NAD  Eyes: EOMI  HEENT: MMM. TMs normal. Throat normal.  Heart: RRR, no murmurs  Lungs: No increased work of breathing, CTAB, on RA  GI: Soft, NTND, no guarding or rebound  Extremities: WWP, cap refill <2sec, no pitting edema in LE b/l  Neuro: Alert, symmetrical facies, moves all extremities equally  Skin: No rashes or lesions  Psych: Appropriate mood and affect    Assessment/Plan     The complexity of medical decision making for this patient's transitional care is moderate.    Patient is frustrated by not fully understanding how she is supposed to be taking her medications and feeling that different doctors tell her to do different things.  Will refer her to nurse care manager for complex care coordination.  Patient is open to this.    Patient is stressed about not being able to do things around her house that she used to be able to.  Recommended she look into hiring help or even the [employer] program through her insurance.    Follow-up in 1 month    Medicare annual wellness visit in November    Problem List Items Addressed This Visit       Paroxysmal " atrial fibrillation (Multi)    Overview   Continue following with cardiology-EP. Continue taking metoprolol succinate, Xarelto per cards.  Patient also has a loop recorder in.         Dependent edema    Overview   Continue spironolactone and Lasix.  Continue wearing compression socks daily,         Dilated idiopathic cardiomyopathy (Multi)    Overview   Follows with cardiology         Gastroesophageal reflux disease    Current Assessment & Plan   Patient believes acid reflux could be contributing to her cough so we will start her on Protonix 40 mg daily         Relevant Medications    pantoprazole (ProtoNix) 40 mg EC tablet    JENI (obstructive sleep apnea)    Overview   Continue following with sleep medicine         Current Assessment & Plan   Not wearing CPAP right now because she does not know how to connected to her oxygen and how much oxygen she should be on with the CPAP.  She is just wearing her supplemental oxygen via nasal cannula at night currently and she feels like she is sleeping and breathing fine         Acute respiratory failure with hypoxia - Primary    Overview   Wearing 2-2.5 L of supplemental oxygen via nasal cannula         Relevant Orders    Follow Up In [facility] - PCP - Medicare Annual    Follow Up In [facility] - PCP - Established    Chronic diastolic (congestive) heart failure    Overview   Follows with cardiology         COPD exacerbation (Multi)    Current Assessment & Plan   Finish prednisone.  She is supposed to be on Breztri 2 puffs twice daily however she has not really been taking this because she thought albuterol was her maintenance inhaler and Breztri was her rescue inhaler         Allergic rhinitis    Current Assessment & Plan   Recommend Flonase         Relevant Medications    fluticasone (Flonase) 50 mcg/actuation nasal spray    Community acquired pneumonia    Current Assessment & Plan   Finish doxycycline and Augmentin          Other Visit Diagnoses          Obesity, Class III, BMI 40-49.9 (morbid obesity)          Complex care coordination        Relevant Orders    Follow Up In [facility] - Care Manager          Elsie Ziegler D.O.  Family Medicine Physician  [facility]  [address] [place] [address] [phone]    This note has been transcribed using Dragon voice recognition system and there is a possibility of unintentional typing misprints.

## 2025-05-30 NOTE — ASSESSMENT & PLAN NOTE
Finish prednisone.  She is supposed to be on Breztri 2 puffs twice daily however she has not really been taking this because she thought albuterol was her maintenance inhaler and Breztri was her rescue inhaler

## 2025-05-30 NOTE — PATIENT INSTRUCTIONS
MORNING: LEVOTHYROXINE, FUROSEMIDE (LASIX), SPIRONOLACTONE, PREDNISONE, LIPITOR, BREZTRI, AUGMENTIN, DOXYCYCLINE, PANTOPRAZOLE, FLONASE NASAL SPRAY  EVENING: XARELTO, METOPROLOL, BREZTRI, AUGMENTIN, DOXYCYLINE  Look into [employer] through insurance to help with things around your house  1 augmentin twice a day  1 doxycycline twice a day  Finish prednisone  Stop robitussin DM and start JUST mucinex (guaifenesin) instead  Breztri is your maintenance inhaler (2 puffs twice a day)  Albuterol inhaler, or duonebs nebulizer every 4 hours AS NEEDED for shortness of breath, cough, wheezing  Lipitor aka atorvastatin is the cholesterol pill, you can take this any time of day but morning is fine  Fluticasone aka flonase nasal spray in the morning, 2 sprays in each nostril, once a day, don't sniff in afterwards

## 2025-05-30 NOTE — ASSESSMENT & PLAN NOTE
Not wearing CPAP right now because she does not know how to connected to her oxygen and how much oxygen she should be on with the CPAP.  She is just wearing her supplemental oxygen via nasal cannula at night currently and she feels like she is sleeping and breathing fine

## 2025-06-02 ENCOUNTER — PATIENT MESSAGE (OUTPATIENT)
Dept: SLEEP MEDICINE | Facility: HOSPITAL | Age: 75
End: 2025-06-02

## 2025-06-02 ENCOUNTER — APPOINTMENT (OUTPATIENT)
Dept: PHARMACY | Facility: HOSPITAL | Age: 75
End: 2025-06-02
Payer: MEDICARE

## 2025-06-02 DIAGNOSIS — G47.33 OSA (OBSTRUCTIVE SLEEP APNEA): ICD-10-CM

## 2025-06-02 DIAGNOSIS — I50.32 CHRONIC DIASTOLIC (CONGESTIVE) HEART FAILURE: ICD-10-CM

## 2025-06-02 DIAGNOSIS — J44.1 COPD EXACERBATION (MULTI): ICD-10-CM

## 2025-06-02 DIAGNOSIS — I48.0 PAROXYSMAL ATRIAL FIBRILLATION (MULTI): ICD-10-CM

## 2025-06-02 NOTE — PROGRESS NOTES
Pharmacy Post-Discharge Visit    Helen Johnston is a 74 y.o. female was referred to Clinical Pharmacy Team to complete a post-discharge medication optimization and monitoring visit.  The patient was referred for their COPD and Congestive Heart Failure.    Pt is here for First appointment.     Admission Date: 5/18, 5/25 ED  Discharge Date: 5/19    PCP/ Referring Provider: Elsie Ziegler DO  Last Visit: 5.30.25      Subjective   Subjective     Medication Reconciliation:  Changed: none  Added: none  Discontinued: Olux foam, doxycycline, DermOtic, Diflucan    Drug Interactions  None requiring intervention at this time    Medication System Management (Adherence)  Patient did take medications today.   Number of missed doses in last 7 days: 0.    Can patient afford prescribed medications: Yes, no issues reported    Preferred pharmacy:     [facility] - [place] - [facility] AT [facility]  [place] [address]  Phone: [phone] Fax: [phone]    [id number] [address] AT [facility]  [id number] [place] [id number]  Phone: [phone] Fax: [phone]    [facility]  [address] [address]  Phone: [phone] Fax: [phone]    Allergies[1]    Social History     Social History Narrative    Not on file        Notable Medication changes following discharge:  Start: prednisone, Augmentin  Stop: tirzepatide (never started)  Change: atorvastatin    HPI  CONGESTIVE HEART FAILURE ASSESSMENT  Does patient follow with Cardiology: Yes    Date: 5.22.25    Staging  Ejection Fraction: 55-60% (5/18/25)  NYHA Class: Class II - Slight limitation to physical activity  ACC/AHA Stage: C - Disease with symptoms    Symptom Assessment  Weight changes/edema?: No patient declines  Dyspnea?: With exertion, at baseline with  COPD  Dizziness/syncope/palpitations?: No, denies    Medication Therapy  Current Regimen (GDMT):  ARNI/ACEi/ARB: No - anaphylaxis with Lisinopril  Beta Blocker: Yes - metoprolol XL 50mg daily  MRA: Yes - spironolactone 25mg daily  SGLT2i: No    Other therapy:  Furosemide 40mg daily  Compression stockings    Secondary Prevention  The 10-year ASCVD risk score (Lavelle SILVA, et al., 2019) is: 29.7%    Values used to calculate the score:      Age: 74 years      Sex: Female      Is Non- : No      Diabetic: Yes      Tobacco smoker: No      Systolic Blood Pressure: 128 mmHg      Is BP treated: Yes      HDL Cholesterol: 40 mg/dL      Total Cholesterol: 145 mg/dL  Aspirin 81mg? no  Statin?: Yes - atorvastatin 40mg daily  HTN?: Yes - controlled     ATRIAL FIBRILLATION paroxysmal   Does patient follow with cardiology? Yes  Last cardiology appointment: 5.22.25    Current atrial fibrillations medications include:  Xarelto 20mg daily  Metoprolol XL 50mg daily    Screening for dose adjustment:  CrCl <50mL/min? No    Pertinent Medical History  Medical history: n/a  Social history: n/a  Medication history: n/a    Monitoring:  Date of last BMP: 5.22.25  Last echo:5.22.25  Recent Hospitalizations: 5/2025  Recent Falls/Trauma: none  Changes in Tobacco or Alcohol Intake: none      PULMONARY ASSESSMENT  Patient has been diagnosed with: COPD, JENI on CPAP  does see a pulmonologist  Last visit: 5.22.25  has had PFT's completed in last 2 years    Current Regimen:  Breztri 160mcg 2 puffs twice daily   Albuterol Q4-6H PRN   DuoNeb Q4H PRN - uses occasionally     Clarifications to above regimen: doesn't always remember to take Breztri  Adverse Effects: none reported    Symptom Management:  Current symptoms: dyspnea and cough  Triggers: exertion  Alleviating factors: albuterol, rest    Exacerbation Hx:  When was your last hospitalization for an exacerbation? 5/2025 d/t PNA  When was the last time you were treated with  antibiotics and/or steroids? 5/2025     Rescue Inhaler Use:  How often do you use your rescue inhaler? daily    Does patient have appropriate inhaler technique? yes    Smoking history  She has never smoked.      Review of Systems    Objective     BP Readings from Last 4 Encounters:   05/30/25 128/80   05/26/25 126/70   05/22/25 118/70   05/22/25 104/64      There were no vitals filed for this visit.     LAB  Creatinine   Date Value Ref Range Status   05/25/2025 1.11 (H) 0.50 - 1.05 mg/dL Final     eGFR   Date Value Ref Range Status   05/25/2025 53 (L) >60 mL/min/1.73m*2 Final     Comment:     Calculations of estimated GFR are performed using the 2021 CKD-EPI Study Refit equation without the race variable for the IDMS-Traceable creatinine methods.  https://jasn.asnjournals.org/content/early/2021/09/22/ASN.3167120624     Sodium   Date Value Ref Range Status   05/25/2025 133 (L) 136 - 145 mmol/L Final     Potassium   Date Value Ref Range Status   05/25/2025 3.9 3.5 - 5.3 mmol/L Final     Chloride   Date Value Ref Range Status   05/25/2025 94 (L) 98 - 107 mmol/L Final     Urea Nitrogen   Date Value Ref Range Status   05/25/2025 32 (H) 6 - 23 mg/dL Final     AST   Date Value Ref Range Status   05/25/2025 49 (H) 9 - 39 U/L Final     Comment:     MARKED HEMOLYSIS DETECTED. The result may be falsely elevated due to hemolysis or other interferents. Clinical correlation is recommended. Repeat testing may be considered.        Lab Results   Component Value Date    BILITOT 0.7 05/25/2025    CALCIUM 10.4 (H) 05/25/2025    CO2 33 (H) 05/25/2025    CL 94 (L) 05/25/2025    CREATININE 1.11 (H) 05/25/2025    GLUCOSE 116 (H) 05/25/2025    ALKPHOS 56 05/25/2025    K 3.9 05/25/2025    PROT 8.2 05/25/2025     (L) 05/25/2025    AST 49 (H) 05/25/2025    ALT 20 05/25/2025    BUN 32 (H) 05/25/2025    ANIONGAP 13 05/25/2025    MG 2.11 05/19/2025    PHOS 3.4 05/19/2025    ALBUMIN 3.8 05/25/2025    LIPASE 28 06/11/2024    GFRF 71  04/16/2023     Lab Results   Component Value Date    TRIG 160 (H) 04/01/2025    CHOL 145 04/01/2025    LDLCALC 79 04/01/2025    HDL 40 (L) 04/01/2025     Lab Results   Component Value Date    HGBA1C 6.1 (H) 04/01/2025         Current Outpatient Medications   Medication Instructions    albuterol 90 mcg/actuation inhaler 2 puffs, inhalation, Every 4 hours PRN    atorvastatin (LIPITOR) 40 mg, oral, Daily    budesonide-glycopyr-formoterol (Breztri Aerosphere) 160-9-4.8 mcg/actuation HFA aerosol inhaler 2 puffs, inhalation, 2 times daily RT, Rinse mouth with water after use to reduce aftertaste and incidence of candidiasis. Do not swallow.    compression socks, large misc 2 each, miscellaneous, Daily    cycloSPORINE 0.05 % drops 1 drop, 2 times daily    fluticasone (Flonase) 50 mcg/actuation nasal spray 2 sprays, Each Nostril, Daily, Shake gently. Before first use, prime pump. After use, clean tip and replace cap.    furosemide (LASIX) 40 mg, oral, Daily    ipratropium-albuteroL (Duo-Neb) 0.5-2.5 mg/3 mL nebulizer solution 3 mL, nebulization, Every 4 hours PRN    ketoconazole (NIZOral) 2 % shampoo 1 Application, 2 times weekly    levothyroxine (SYNTHROID, LEVOXYL) 50 mcg, Daily    lubricating eye drops ophthalmic solution 1 drop, As needed    metoprolol succinate XL (TOPROL-XL) 50 mg, oral, Nightly    pantoprazole (PROTONIX) 40 mg, oral, Daily, Do not crush, chew, or split.    predniSONE (Deltasone) 10 mg tablet Take 4 tabs (40mg) daily for 3 days, then 3 tabs (30mg) daily for 3 days,  2 tabs (20mg) daily for 3 days,  1 tab (10 mg) daily for 3 days.    spironolactone (ALDACTONE) 25 mg, oral, Daily    Xarelto 20 mg tablet TAKE 1 TABLET ONCE DAILY INTHE EVENING WITH A MEAL        Assessment/Plan   Problem List Items Addressed This Visit       Paroxysmal atrial fibrillation (Multi)    Patient is to be on anticoagulation indefinitely. Patient reports she has been in and out of afib more recently and might need to have a  pacemaker   Rationale for plan: No changes needed today. Continue to monitor with cardiology    Medication Changes:  CONTINUE  Xarelto 20mg daily  Metoprolol XL 50mg daily    Monitoring and Education Discussed:  Xarelto Education:  Counseled patient on Xarelto MOA, expectations, side effects, duration of therapy, administration, and monitoring parameters.  Answered all patient questions and concerns.  Take once daily with food  If you miss a dose, take when you remember unless it is close to the next scheduled dose. Never double up doses.   Let everyone involved in your care, such as a dentist or other provider, know that you are taking rivaroxaban (Xarelto®).  Watch for symptoms of:  stroke such as loss of balance, blurry vision, facial drooping, Arm/leg weakness, speech difficulties (Remember BE FAST)   blood clots (DVT) such as skin being warm to touch, swelling, redness, and pain/tenderness not caused by injury   Pulmonary embolism (PE) such as difficulty breathing, coughing up blood, fast heart rate, or chest pain with deep breath or cough   Side Effects:   If bleeding does occur, it may take a little longer than usual to stop. Let your doctor know if you develop a  rash of dark red spots under the skin.  Signs of bleeding that you should call your doctor:   Gums that bleed after brushing your teeth lasting more than 15 minutes.  A nose bleed that lasts for more than 15 minutes.  Bleeding from a cut that does not stop in 15 minutes.  Urine that looks red, or urine that is dark like coffee.  Stool (BMs) that are covered with blood, or are black.  Vomit that is bright red or vomit that looks like coffee.    Counseled patient of side effects that are indicative of bleeding such as dark tarry stool, unexplainable bruising, or vomiting up a coffee ground like substance  Reminded patient of importance of anticoagulation therapy to prevent risk of heart attack or stroke  Counseled patient on MOA, expectations,  duration of therapy, contraindications, administration, and monitoring parameters   Answered all patient questions and concerns          Chronic diastolic (congestive) heart failure    Patient has Stage C Class II HFpEF with most recent EF 55-60%.  Currently managed on metoprolol, spironolactone and furosemide. Does sometimes wear compression stockings. No changes needed to regimen today.   Would encourage weight loss and compliance to CPAP to reduce stress to heart.    Medication Changes:  CONTINUE:  Metoprolol XL 50mg daily  Spironolactone 25mg daily  Furosemide 40mg daily    Monitoring and Education:  Weigh yourself without clothing daily after using the bathroom first thing in the morning before breakfast   Contact your physician/seek help immediately if you notice the following with symptoms of shortness of breath or swelling in your extremities:   Weight gain of 3+ lbs overnight   Weight gain of 5+ lbs in a week   Physical limitations to your normal physical activity level   Limit fluid intake as instructed by your doctor and follow a heart friendly diet low in salt, fat, and focused in lean meats   Aim to exercise for 30 minutes anywhere between 3 to 5 times a week or more, depending on your physical limitations   Keep a log of your daily BP, HR and weight to share with providers   If you are a smoker or drink alcohol, consider cessation to improve your heart health            COPD exacerbation (Multi)    ASSESSMENT:  Patient with COPD that is stable, improved, and borderline controlled. Based on CAT score, exacerbation history, and eosinophil count, patient is GOLD Group E and triple therapy is recommended.   Patient is noncompliant with Breztri d/t losing inhaler. We discussed placing it in the kitchen or bathroom (somewhere she will surely go to twice per day). Also discussed switching to Trelegy in the future for less frequent administration. Emphasized importance of compliance with maintenance inhaler.    Of note, patient with JENI and is noncompliant to CPAP.    PLAN:  Medication Changes:  CONTINUE:  Breztri 160mcg 2 puffs twice daily   Albuterol Q4-6H PRN   DuoNeb Q4H PRN    Monitoring and Education:  Breztri Aerosphere and ProAir HFA Education:  Counseled patient on Breztri Aerosphere and ProAir HFA MOA, expectations, side effects, duration of therapy, administration, and monitoring parameters.  Priming instructions:  Prime inhaler if it is new, has not been used in 7 weeks, or if you drop it.   Remove cap, shake inhaler for 5 seconds, and spray it once away from you; repeat for a total of 4 sprays.  Administration instructions  Remove cap. Place middle or index finger on top of canister and thumb underneath the mouthpiece of the inhaler.   Shake inhaler for 5-10 seconds.  Breathe out fully away from the mouthpiece (in preparation to breathe in medication).  Put the mouthpiece in your mouth and close your lips around it. Do not block the mouthpiece with your teeth or tongue.  Push the top of the canister all the way down one time while breathing in deeply and slowly through your mouth.  Hold your breath for up to 10 seconds, and then breathe out again away from the inhaler.  If your physician has prescribed more than one dose (puff), wait 30 seconds and repeat as above.  Put the cap back on.   To clean, remove cannister from plastic container. Run water through plastic container upside down for 30 seconds. Shake off any excess water and let air dry overnight. Reassemble inhaler.  If inhaler contains a steroid, rinse mouth with water after use. Spit out water. Do not swallow.  Side effects include: nose/throat irritation, oral candidiasis, anxiety, high heart rate, dry mouth and/or bitter taste  All questions and concerns addressed.              Health Maintenance:  Health Maintenance Due   Topic Date Due    Diabetes: Urine Protein Screening  Never done    Medicare Annual Wellness Visit (AWV)  Never done     Colorectal Cancer Screening  Never done    Hepatitis A Vaccines (1 of 2 - Risk 2-dose series) Never done    Pneumococcal Vaccine (1 of 2 - PCV) Never done    Zoster Vaccines (1 of 2) Never done    RSV High Risk: (Elderly (60+) or Pregnant Population) (1 - Risk 60-74 years 1-dose series) Never done    Hepatitis B Vaccines (1 of 3 - Risk 3-dose series) Never done    COVID-19 Vaccine (6 - 2024-25 season) 09/01/2024    Diabetes: Hemoglobin A1C  07/01/2025       Follow-up: 4 weeks     Patient was provided with PharmD phone number and encouraged to reach out with any questions or concerns Prior to next appointment or ask provider for another pharmacy referral.    Time spent with pt: Total length of time 40 (minutes) of the encounter and more than 50% was spent counseling the patient.  Follows with CCM.     Thank you for allowing to take part in the care of this patient.    Gladis Dumont, PharmD, PATRICIA  Clinical Pharmacist  [phone]  [email or web address]    Continue all meds under the continuation of care with the referring provider and clinical pharmacy team.    Verbal consent to manage patient's drug therapy was obtained from the patient. They were informed they may decline to participate or withdraw from participation in pharmacy services at any time.         [1]   Allergies  Allergen Reactions    Lisinopril Anaphylaxis    Thiopental Shortness of breath    Vancomycin Shortness of breath    Cefuroxime Rash    Erythromycin Swelling and Rash    Linezolid Itching and Swelling    Novocain [Procaine] Palpitations     Heart racing with dental procedure    Silver Sulfadiazine Swelling and Rash

## 2025-06-03 ENCOUNTER — PATIENT OUTREACH (OUTPATIENT)
Dept: PRIMARY CARE | Facility: CLINIC | Age: 75
End: 2025-06-03
Payer: MEDICARE

## 2025-06-03 LAB
ATRIAL RATE: 107 BPM
ATRIAL RATE: 88 BPM
P AXIS: 57 DEGREES
P OFFSET: 125 MS
P ONSET: 84 MS
PR INTERVAL: 250 MS
Q ONSET: 209 MS
Q ONSET: 210 MS
QRS COUNT: 14 BEATS
QRS COUNT: 16 BEATS
QRS DURATION: 90 MS
QRS DURATION: 92 MS
QT INTERVAL: 348 MS
QT INTERVAL: 350 MS
QTC CALCULATION(BAZETT): 421 MS
QTC CALCULATION(BAZETT): 446 MS
QTC FREDERICIA: 395 MS
QTC FREDERICIA: 412 MS
R AXIS: -24 DEGREES
R AXIS: -28 DEGREES
T AXIS: 45 DEGREES
T AXIS: 54 DEGREES
T OFFSET: 383 MS
T OFFSET: 385 MS
VENTRICULAR RATE: 88 BPM
VENTRICULAR RATE: 98 BPM

## 2025-06-03 NOTE — ASSESSMENT & PLAN NOTE
Patient is to be on anticoagulation indefinitely. Patient reports she has been in and out of afib more recently and might need to have a pacemaker   Rationale for plan: No changes needed today. Continue to monitor with cardiology    Medication Changes:  CONTINUE  Xarelto 20mg daily  Metoprolol XL 50mg daily    Monitoring and Education Discussed:  Xarelto Education:  Counseled patient on Xarelto MOA, expectations, side effects, duration of therapy, administration, and monitoring parameters.  Answered all patient questions and concerns.  Take once daily with food  If you miss a dose, take when you remember unless it is close to the next scheduled dose. Never double up doses.   Let everyone involved in your care, such as a dentist or other provider, know that you are taking rivaroxaban (Xarelto®).  Watch for symptoms of:  stroke such as loss of balance, blurry vision, facial drooping, Arm/leg weakness, speech difficulties (Remember BE FAST)   blood clots (DVT) such as skin being warm to touch, swelling, redness, and pain/tenderness not caused by injury   Pulmonary embolism (PE) such as difficulty breathing, coughing up blood, fast heart rate, or chest pain with deep breath or cough   Side Effects:   If bleeding does occur, it may take a little longer than usual to stop. Let your doctor know if you develop a  rash of dark red spots under the skin.  Signs of bleeding that you should call your doctor:   Gums that bleed after brushing your teeth lasting more than 15 minutes.  A nose bleed that lasts for more than 15 minutes.  Bleeding from a cut that does not stop in 15 minutes.  Urine that looks red, or urine that is dark like coffee.  Stool (BMs) that are covered with blood, or are black.  Vomit that is bright red or vomit that looks like coffee.    Counseled patient of side effects that are indicative of bleeding such as dark tarry stool, unexplainable bruising, or vomiting up a coffee ground like substance  Reminded  patient of importance of anticoagulation therapy to prevent risk of heart attack or stroke  Counseled patient on MOA, expectations, duration of therapy, contraindications, administration, and monitoring parameters   Answered all patient questions and concerns

## 2025-06-03 NOTE — PROGRESS NOTES
Unable to reach patient for follow up call after recent hospitalization and PCP follow up appt.   Left voicemail with call back number for patient to call if needed

## 2025-06-03 NOTE — ASSESSMENT & PLAN NOTE
ASSESSMENT:  Patient with COPD that is stable, improved, and borderline controlled. Based on CAT score, exacerbation history, and eosinophil count, patient is GOLD Group E and triple therapy is recommended.   Patient is noncompliant with Breztri d/t losing inhaler. We discussed placing it in the kitchen or bathroom (somewhere she will surely go to twice per day). Also discussed switching to Trelegy in the future for less frequent administration. Emphasized importance of compliance with maintenance inhaler.   Of note, patient with JENI and is noncompliant to CPAP.    PLAN:  Medication Changes:  CONTINUE:  Breztri 160mcg 2 puffs twice daily   Albuterol Q4-6H PRN   DuoNeb Q4H PRN    Monitoring and Education:  Breztri Aerosphere and ProAir HFA Education:  Counseled patient on Breztri Aerosphere and ProAir HFA MOA, expectations, side effects, duration of therapy, administration, and monitoring parameters.  Priming instructions:  Prime inhaler if it is new, has not been used in 7 weeks, or if you drop it.   Remove cap, shake inhaler for 5 seconds, and spray it once away from you; repeat for a total of 4 sprays.  Administration instructions  Remove cap. Place middle or index finger on top of canister and thumb underneath the mouthpiece of the inhaler.   Shake inhaler for 5-10 seconds.  Breathe out fully away from the mouthpiece (in preparation to breathe in medication).  Put the mouthpiece in your mouth and close your lips around it. Do not block the mouthpiece with your teeth or tongue.  Push the top of the canister all the way down one time while breathing in deeply and slowly through your mouth.  Hold your breath for up to 10 seconds, and then breathe out again away from the inhaler.  If your physician has prescribed more than one dose (puff), wait 30 seconds and repeat as above.  Put the cap back on.   To clean, remove cannister from plastic container. Run water through plastic container upside down for 30 seconds.  Shake off any excess water and let air dry overnight. Reassemble inhaler.  If inhaler contains a steroid, rinse mouth with water after use. Spit out water. Do not swallow.  Side effects include: nose/throat irritation, oral candidiasis, anxiety, high heart rate, dry mouth and/or bitter taste  All questions and concerns addressed.

## 2025-06-03 NOTE — ASSESSMENT & PLAN NOTE
Patient has Stage C Class II HFpEF with most recent EF 55-60%.  Currently managed on metoprolol, spironolactone and furosemide. Does sometimes wear compression stockings. No changes needed to regimen today.   Would encourage weight loss and compliance to CPAP to reduce stress to heart.    Medication Changes:  CONTINUE:  Metoprolol XL 50mg daily  Spironolactone 25mg daily  Furosemide 40mg daily    Monitoring and Education:  Weigh yourself without clothing daily after using the bathroom first thing in the morning before breakfast   Contact your physician/seek help immediately if you notice the following with symptoms of shortness of breath or swelling in your extremities:   Weight gain of 3+ lbs overnight   Weight gain of 5+ lbs in a week   Physical limitations to your normal physical activity level   Limit fluid intake as instructed by your doctor and follow a heart friendly diet low in salt, fat, and focused in lean meats   Aim to exercise for 30 minutes anywhere between 3 to 5 times a week or more, depending on your physical limitations   Keep a log of your daily BP, HR and weight to share with providers   If you are a smoker or drink alcohol, consider cessation to improve your heart health

## 2025-06-05 ENCOUNTER — OFFICE VISIT (OUTPATIENT)
Dept: WOUND CARE | Facility: CLINIC | Age: 75
End: 2025-06-05
Payer: MEDICARE

## 2025-06-05 PROCEDURE — 99211 OFF/OP EST MAY X REQ PHY/QHP: CPT

## 2025-06-10 ENCOUNTER — PATIENT OUTREACH (OUTPATIENT)
Dept: PRIMARY CARE | Facility: CLINIC | Age: 75
End: 2025-06-10
Payer: MEDICARE

## 2025-06-10 ENCOUNTER — APPOINTMENT (OUTPATIENT)
Dept: SLEEP MEDICINE | Facility: HOSPITAL | Age: 75
End: 2025-06-10
Payer: MEDICARE

## 2025-06-10 NOTE — PROGRESS NOTES
Phone call to patient to introduce self and CCM program. Questions were asked and answered. Pt recently enrolled in [employer] through her insurance and had an initial visit from them. Pt does not want to add another service at this time as she feels she has enough to navigate regarding her healthcare. Pt declined CCM at this time.     Encouraged pt to reach out if/when she decides she'd like to pursue CCM. Ensured pt has this RN CM contact information and advised her she can also ask Dr. Ziegler to place the referral if needed. Pt expressed understanding.     CCM referral closed.

## 2025-06-13 ENCOUNTER — CLINICAL SUPPORT (OUTPATIENT)
Dept: SLEEP MEDICINE | Facility: HOSPITAL | Age: 75
End: 2025-06-13
Payer: MEDICARE

## 2025-06-13 VITALS — WEIGHT: 242.51 LBS | BODY MASS INDEX: 44.63 KG/M2 | HEIGHT: 62 IN

## 2025-06-13 DIAGNOSIS — G47.33 OSA (OBSTRUCTIVE SLEEP APNEA): ICD-10-CM

## 2025-06-13 DIAGNOSIS — G47.34 SLEEP RELATED HYPOXIA: ICD-10-CM

## 2025-06-13 DIAGNOSIS — J44.9 CHRONIC OBSTRUCTIVE PULMONARY DISEASE, UNSPECIFIED COPD TYPE (MULTI): ICD-10-CM

## 2025-06-13 ASSESSMENT — SLEEP AND FATIGUE QUESTIONNAIRES
HOW LIKELY ARE YOU TO NOD OFF OR FALL ASLEEP WHILE WATCHING TV: MODERATE CHANCE OF DOZING
SITING INACTIVE IN A PUBLIC PLACE LIKE A CLASS ROOM OR A MOVIE THEATER: SLIGHT CHANCE OF DOZING
HOW LIKELY ARE YOU TO NOD OFF OR FALL ASLEEP WHILE SITTING AND READING: SLIGHT CHANCE OF DOZING
HOW LIKELY ARE YOU TO NOD OFF OR FALL ASLEEP IN A CAR, WHILE STOPPED FOR A FEW MINUTES IN TRAFFIC: SLIGHT CHANCE OF DOZING
ESS-CHAD TOTAL SCORE: 11
HOW LIKELY ARE YOU TO NOD OFF OR FALL ASLEEP WHILE SITTING QUIETLY AFTER LUNCH WITHOUT ALCOHOL: SLIGHT CHANCE OF DOZING
HOW LIKELY ARE YOU TO NOD OFF OR FALL ASLEEP WHEN YOU ARE A PASSENGER IN A CAR FOR AN HOUR WITHOUT A BREAK: MODERATE CHANCE OF DOZING
HOW LIKELY ARE YOU TO NOD OFF OR FALL ASLEEP WHILE SITTING AND TALKING TO SOMEONE: SLIGHT CHANCE OF DOZING
HOW LIKELY ARE YOU TO NOD OFF OR FALL ASLEEP WHILE LYING DOWN TO REST IN THE AFTERNOON WHEN CIRCUMSTANCES PERMIT: MODERATE CHANCE OF DOZING

## 2025-06-14 NOTE — PROGRESS NOTES
[facility] TECH NOTE:     Patient: Helen Johnston   MRN//AGE: [id number]  1950  74 y.o.   Technologist: Simeon Bell   Room: 403   Service Date: 2025        Sleep Testing Location: [facility]    [place]: 11    TECHNOLOGIST SLEEP STUDY PROCEDURE NOTE:   This sleep study is being conducted according to the policies and procedures outlined by the AAS accreditation standards.  The sleep study procedure and processes involved during this appointment was explained to the patient/patient’s family, questions were answered. The patient/family verbalized understanding.      The patient is a 74 y.o. year old female scheduled for a CPAP titration w/ Oxygen Order & TOSCA with montage of: PSG w/ CFLOW & TOSCA. she arrived for her appointment.      The study that was ultimately completed was a CPAP titration w/ Oxygen Order & TOSCA with montage of: PSG w/ CFLOW & TOSCA.    The full study Was completed.  Patient questionnaires completed?: yes     Consents signed? yes    Initial Fall Risk Screening:     Helen has not fallen in the last 6 months. Helen does have a fear of falling. He does not need assistance with sitting, standing, or walking. she does not need assistance walking in her home. she does not need assistance in an unfamiliar setting. The patient isusing an assistive device.     Brief Study observations: Pt presents as 73 y/o Female here for scheduled PAP Titration Study.  Noted Physicians Order regarding starting titration pressure and addition of Oxygen therapy.  Reviewed notes regarding Pt's prior study.  Discussed order w/ Ordering Physician prior to and during study for medical direction.    Study Observations/ Interventions:  Pt arrived on time, was alone, was ambulatory w/ assistance of walker.  Pt appeared alert & oriented throughout interactions w/ sleep staff.  Initiated Titration w/ Resmed Airfit P30i Pillows Mask - S/M @ 42ofV7R of CPAP w/ Humdity +3, & Chinstrap.  Received  verbal Physician's Medical Direction @ 0027 to continue with administration of O2 @ 2lpm via PAP circuit based on evidence of hypoxemic event noted @ 2240.  Administered Oxygen @ 2lpm via PAP circuit per Physicians Order.  Completed titration w/ Resmed Airfit P30i Pillows Mask - S/M @ 45nuD5U of CPAP w/ EPR+3, Humdity +3, & Chinstrap.  Noted no abnormal behaviors throughout duration of study.  Additional Notes:  Early Lights ON per Pt request.  Pt requested to end study early due to being unable to return to sleep.    Deviation to order/protocol and reason: N/A      If PAP, which was preferred mask/pressure/mode: Resmed Airfit P30i Pillows Mask - S/M @ 24otL4Z of CPAP w/ O2 @ 2lpm via PAP Circuit, EPR+3, Humdity +3, & Chinstrap       Other:None    After the procedure, the patient/family was informed to ensure followup with ordering clinician for testing results.      Technologist: Simeon Bell

## 2025-06-25 ENCOUNTER — TELEPHONE (OUTPATIENT)
Dept: SLEEP MEDICINE | Facility: HOSPITAL | Age: 75
End: 2025-06-25
Payer: MEDICARE

## 2025-06-25 NOTE — TELEPHONE ENCOUNTER
Spoke to pt regarding bleeding in 2L of oxygen (oxygen through [facility]) for CPAP (through [facility]).     Gave sleep nurse number for future use. Answered all questions.

## 2025-06-26 ENCOUNTER — APPOINTMENT (OUTPATIENT)
Dept: CARDIOLOGY | Facility: CLINIC | Age: 75
End: 2025-06-26
Payer: MEDICARE

## 2025-06-26 ENCOUNTER — HOSPITAL ENCOUNTER (OUTPATIENT)
Dept: CARDIOLOGY | Facility: CLINIC | Age: 75
Discharge: HOME | End: 2025-06-26
Payer: MEDICARE

## 2025-06-26 VITALS
HEART RATE: 93 BPM | HEIGHT: 62 IN | DIASTOLIC BLOOD PRESSURE: 62 MMHG | BODY MASS INDEX: 44.53 KG/M2 | WEIGHT: 242 LBS | OXYGEN SATURATION: 98 % | SYSTOLIC BLOOD PRESSURE: 112 MMHG

## 2025-06-26 DIAGNOSIS — I48.0 PAROXYSMAL ATRIAL FIBRILLATION (MULTI): Primary | ICD-10-CM

## 2025-06-26 DIAGNOSIS — E78.2 MIXED HYPERLIPIDEMIA: ICD-10-CM

## 2025-06-26 DIAGNOSIS — I50.32 CHRONIC DIASTOLIC (CONGESTIVE) HEART FAILURE: ICD-10-CM

## 2025-06-26 DIAGNOSIS — Z95.818 IMPLANTABLE LOOP RECORDER PRESENT: ICD-10-CM

## 2025-06-26 DIAGNOSIS — I10 BENIGN ESSENTIAL HYPERTENSION: ICD-10-CM

## 2025-06-26 DIAGNOSIS — I48.0 PAROXYSMAL ATRIAL FIBRILLATION (MULTI): ICD-10-CM

## 2025-06-26 NOTE — PROGRESS NOTES
Cardiac Electrophysiology Office Visit     Referred by Dr. Wharton ref. provider found for   No chief complaint on file.    HPI:  Helen Johnston is a 74 y.o. year old female patient with h/o hypertension, diastolic heart failure, diabetes, HLD, JENI (not complaint with CPAP) paroxysmal atrial fibrillation presenting today for follow up    PMHx/PSHx: As above  Tobacco Denies, Alcohol Social, Caffeine use  1 cups of tea / day, Drug use  Denies    Objective  Allergies   Allergen Reactions    Lisinopril Anaphylaxis    Thiopental Shortness of breath    Vancomycin Shortness of breath    Cefuroxime Rash    Erythromycin Swelling and Rash    Linezolid Itching and Swelling    Novocain [Procaine] Palpitations     Heart racing with dental procedure    Silver Sulfadiazine Swelling and Rash      Current Outpatient Medications   Medication Instructions    albuterol 90 mcg/actuation inhaler 2 puffs, inhalation, Every 4 hours PRN    atorvastatin (LIPITOR) 40 mg, oral, Daily    budesonide-glycopyr-formoterol (Breztri Aerosphere) 160-9-4.8 mcg/actuation HFA aerosol inhaler 2 puffs, inhalation, 2 times daily RT, Rinse mouth with water after use to reduce aftertaste and incidence of candidiasis. Do not swallow.    compression socks, large misc 2 each, miscellaneous, Daily    cycloSPORINE 0.05 % drops 1 drop, 2 times daily    fluticasone (Flonase) 50 mcg/actuation nasal spray 2 sprays, Each Nostril, Daily, Shake gently. Before first use, prime pump. After use, clean tip and replace cap.    furosemide (LASIX) 40 mg, oral, Daily    ipratropium-albuteroL (Duo-Neb) 0.5-2.5 mg/3 mL nebulizer solution 3 mL, nebulization, Every 4 hours PRN    ketoconazole (NIZOral) 2 % shampoo 1 Application, 2 times weekly    levothyroxine (SYNTHROID, LEVOXYL) 50 mcg, Daily    lubricating eye drops ophthalmic solution 1 drop, As needed    metoprolol succinate XL (TOPROL-XL) 50 mg, oral, Nightly    pantoprazole (PROTONIX) 40 mg, oral, Daily, Do not crush, chew, or  "split.    spironolactone (ALDACTONE) 25 mg, oral, Daily    Xarelto 20 mg tablet TAKE 1 TABLET ONCE DAILY INTHE EVENING WITH A MEAL         Visit Vitals  /62 (BP Location: Left arm, Patient Position: Sitting)   Pulse 93   Ht 1.575 m (5' 2\")   Wt 110 kg (242 lb)   SpO2 98%   BMI 44.26 kg/m²   OB Status Postmenopausal   Smoking Status Never   BSA 2.19 m²      Physical Exam  Vitals reviewed.   Constitutional:       Appearance: Normal appearance. She is obese.   HENT:      Head: Normocephalic.   Cardiovascular:      Rate and Rhythm: Normal rate and regular rhythm.   Pulmonary:      Effort: Pulmonary effort is normal. No respiratory distress.      Breath sounds: No wheezing.   Skin:     General: Skin is warm and dry.      Capillary Refill: Capillary refill takes less than 2 seconds.   Neurological:      Mental Status: She is alert.   Psychiatric:         Mood and Affect: Mood normal.      My Interpretation of Reviewed Study(s):  Results  DIAGNOSTIC      DIAGNOSTIC  Echocardiogram: EF 55-60%, left ventricular hypertrophy, abnormal relaxation, diastolic dysfunction, mildly dilated left atrium, normal right atrium, mitral valve calcification (05/2025)  Echo (June 2022): Normal left ventricular systolic function with an EF of 60 to 65%.  Mildly dilated left atrium with a right atrium upper limit of normal.  No pericardial effusion noted.  [id number] Score  Age 65-74: 1   Sex Female: 1   CHF History Yes: 1   HTN Yes: 1   Stroke/TIA/Thromboembolism No: 0   Vascular Dz: CAD/PAD/Aortic Plaque No: 0   DM No: 0   Total Score 4     Assessment & Plan  Paroxysmal atrial fibrillation s/p CryoPVI (2018)  AF Dx History: years ago, feels fatigue but not palpitations or fluttering sensation.; h/o Cardioversion: Yes; AAD Use: Dofetilide 500mcg BID (stopped due to QT prolongation and SSS); Anticoagulation use: Xarelto 20mg Daily (current) Warfarin (stopped due to Change in Rx); h/o Ablation: 2018; [id number] Score: 4. Overall " "burden 18.9% on ILR.    Increasing AFib burden, asymptomatic. Tachycardia during AFib episodes, slow heart rate otherwise, indicating tachy-linda syndrome. Medications for tachycardia not suitable due to bradycardia. Potential future need for pacemaker discussed, intervention postponed until AFib persistence or symptom exacerbation.  - Continue rivaroxaban 20 mg daily with evening meal  - Continue metoprolol succinate XL 50 mg once daily  - Discuss potential repeat ablation (with Dr. Bright) or pacemaker placement +/- AV node ablation (pts preference) at next visit    Junctional Rhythm - Resolved  Pt reports some \"off balance: ILR does not any episodes of significant bradycardia or pauses. The off balance is during ambulation.   - Counseled pt on monitoring for dizziness, syncope or near syncope    Diastolic heart failure  Diastolic dysfunction with mildly dilated left atrium, normal right atrium. Echocardiogram shows 55-60% EF, thickened heart, abnormal relaxation. No significant valve blockage, calcification on mitral valve. Susceptible to fluid retention due to impaired fluid/salt accommodation.  - Continue spironolactone 25 mg once daily  - Continue metoprolol succinate XL 50 mg once daily  - Advise to monitor diet, especially salt intake  - Recheck in 6 months    Hypertension  Generally controlled blood pressure with occasional elevations, likely related to dietary salt intake.  - Continue metoprolol succinate XL 50 mg once daily  - Advise to monitor and reduce salt intake    Return to Clinic: Patient should return to the EP Clinic in 6 months    Otto Rojas MD [facility]  Cardiac Electrophysiology  [email or web address]    **Disclaimer: This note was dictated by speech recognition, and every effort has been made to prevent any error in transcription, however minor errors may be present**    This medical note was created with the assistance of artificial intelligence (AI) for documentation purposes. The " content has been reviewed and confirmed by the healthcare provider for accuracy and completeness. Patient consented to the use of audio recording and use of AI during their visit.

## 2025-06-30 ENCOUNTER — APPOINTMENT (OUTPATIENT)
Dept: PRIMARY CARE | Facility: CLINIC | Age: 75
End: 2025-06-30
Payer: MEDICARE

## 2025-06-30 VITALS
DIASTOLIC BLOOD PRESSURE: 60 MMHG | SYSTOLIC BLOOD PRESSURE: 114 MMHG | HEIGHT: 62 IN | TEMPERATURE: 97.2 F | OXYGEN SATURATION: 94 % | WEIGHT: 255 LBS | BODY MASS INDEX: 46.93 KG/M2 | RESPIRATION RATE: 20 BRPM | HEART RATE: 92 BPM

## 2025-06-30 DIAGNOSIS — J44.9 CHRONIC OBSTRUCTIVE PULMONARY DISEASE, UNSPECIFIED COPD TYPE (MULTI): Primary | ICD-10-CM

## 2025-06-30 DIAGNOSIS — J96.01 ACUTE RESPIRATORY FAILURE WITH HYPOXIA: ICD-10-CM

## 2025-06-30 DIAGNOSIS — H10.13 ALLERGIC CONJUNCTIVITIS OF BOTH EYES: ICD-10-CM

## 2025-06-30 DIAGNOSIS — F41.9 ANXIETY: ICD-10-CM

## 2025-06-30 DIAGNOSIS — I50.32 CHRONIC DIASTOLIC (CONGESTIVE) HEART FAILURE: ICD-10-CM

## 2025-06-30 DIAGNOSIS — R60.9 DEPENDENT EDEMA: ICD-10-CM

## 2025-06-30 DIAGNOSIS — K21.9 GASTROESOPHAGEAL REFLUX DISEASE WITHOUT ESOPHAGITIS: ICD-10-CM

## 2025-06-30 DIAGNOSIS — G47.33 OSA (OBSTRUCTIVE SLEEP APNEA): ICD-10-CM

## 2025-06-30 DIAGNOSIS — E66.813 CLASS 3 SEVERE OBESITY DUE TO EXCESS CALORIES WITH SERIOUS COMORBIDITY AND BODY MASS INDEX (BMI) OF 45.0 TO 49.9 IN ADULT: ICD-10-CM

## 2025-06-30 PROBLEM — J18.9 COMMUNITY ACQUIRED PNEUMONIA: Status: RESOLVED | Noted: 2025-05-30 | Resolved: 2025-06-30

## 2025-06-30 PROCEDURE — G2211 COMPLEX E/M VISIT ADD ON: HCPCS

## 2025-06-30 PROCEDURE — 3008F BODY MASS INDEX DOCD: CPT

## 2025-06-30 PROCEDURE — 1159F MED LIST DOCD IN RCRD: CPT

## 2025-06-30 PROCEDURE — 3078F DIAST BP <80 MM HG: CPT

## 2025-06-30 PROCEDURE — 3074F SYST BP LT 130 MM HG: CPT

## 2025-06-30 PROCEDURE — 99214 OFFICE O/P EST MOD 30 MIN: CPT

## 2025-06-30 RX ORDER — AZELASTINE HYDROCHLORIDE 0.5 MG/ML
1 SOLUTION/ DROPS OPHTHALMIC 2 TIMES DAILY
Qty: 6 ML | Refills: 11 | Status: SHIPPED | OUTPATIENT
Start: 2025-06-30 | End: 2026-06-30

## 2025-06-30 ASSESSMENT — PATIENT HEALTH QUESTIONNAIRE - PHQ9
2. FEELING DOWN, DEPRESSED OR HOPELESS: NOT AT ALL
SUM OF ALL RESPONSES TO PHQ9 QUESTIONS 1 AND 2: 0
1. LITTLE INTEREST OR PLEASURE IN DOING THINGS: NOT AT ALL

## 2025-06-30 NOTE — ASSESSMENT & PLAN NOTE
Recommend Zyrtec every day.  Will send azelastine and for her to try.  She can continue using the steroid in the ear for the eczema and itching.

## 2025-06-30 NOTE — PATIENT INSTRUCTIONS
Look into [employer] program through your insurance   If that is not covered, look into hiring someone to help you for a few hours a week   Zyrtec every day   New allergy eye drop 1 drop in each eye twice a day  Steroid in ear  Start wearing cpap with oxygen  Wear compression socks  Medicare physical in November

## 2025-06-30 NOTE — PROGRESS NOTES
"Subjective   Helen Johnston is a 74 y.o. female   Patient presents for 1 month follow-up.  Since last time she is doing much better physically and mentally.  She started the Protonix for acid reflux and it is definitely helping and she is also not coughing as much.  Her breathing is much better.  She thinks she is about 85% back to her normal self.  She is not wearing oxygen at all currently.  She did do a new sleep study and was told that she should start using 2 L of oxygen with her CPAP at night.  She has not started this quite yet.  She did get a call from our nurse care coordinator to offer to help with complex care coordination but patient feels like she is overall doing better and is not in need of the services anymore.  She is doing things around her house more now that she is feeling better.  However she still thinks she needs some help.  She has not yet looked into the [employer] program or hiring somebody for a couple hours a week.  She went to a concert recently and had a wonderful time.  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    She reports that her allergies have been worse recently.  It also causes a flareup of her itchy eyes, itchy ears, and itchy all over her body.  She does have a steroid at home to use in her ears.  She has Pataday eyedrops at home but has not been using them consistently.  She took Zyrtec 1 day and is not sure how much it helped or not.  She is working in the yard a lot also.    Objective   /60   Pulse 92   Temp 36.2 °C (97.2 °F)   Resp 20   Ht 1.575 m (5' 2\")   Wt 116 kg (255 lb)   SpO2 94%   BMI 46.64 kg/m²    PHYSICAL EXAM  Gen: Well appearing, in NAD  Eyes: EOMI  HEENT: MMM. TMs normal. Throat normal.  Heart: RRR, no murmurs  Lungs: No increased work of breathing, CTAB, on RA  GI: Soft, NTND, no guarding or rebound  Extremities: WWP, cap refill <2sec, 2+ pitting edema in LE b/l  Neuro: Alert, symmetrical facies, moves all " extremities equally  Skin: No rashes or lesions  Psych: Appropriate mood and affect    Assessment/Plan     Follow-up for Medicare annual wellness visit in November 2025    Problem List Items Addressed This Visit       Anxiety    Dependent edema    Overview   Continue spironolactone and Lasix.  Continue wearing compression socks daily         Gastroesophageal reflux disease without esophagitis    Overview   Well-controlled on pantoprazole 40 mg daily         JENI (obstructive sleep apnea)    Overview   Continue following with sleep medicine.  Will be starting to wear her CPAP with 2 L of supplemental oxygen at night         Acute respiratory failure with hypoxia    Overview   Pretty much off of the supplemental oxygen during the day at this point         Class 3 severe obesity due to excess calories with serious comorbidity and body mass index (BMI) of 45.0 to 49.9 in adult    Overview   Counseled on healthy diet and regular exercise         Chronic diastolic (congestive) heart failure    Overview   Follows with cardiology         COPD (chronic obstructive pulmonary disease) (Multi) - Primary    Overview   Follows with pulmonology.  On Breztri 2 puffs twice daily albuterol and DuoNebs as needed         Allergic conjunctivitis of both eyes    Current Assessment & Plan   Recommend Zyrtec every day.  Will send azelastine and for her to try.  She can continue using the steroid in the ear for the eczema and itching.         Relevant Medications    azelastine (Optivar) 0.05 % ophthalmic solution     Elsie Ziegler D.O.  Family Medicine Physician  [facility]  [address] [place] [address] [phone]    This note has been transcribed using Dragon voice recognition system and there is a possibility of unintentional typing misprints.

## 2025-07-01 ENCOUNTER — APPOINTMENT (OUTPATIENT)
Dept: PHARMACY | Facility: HOSPITAL | Age: 75
End: 2025-07-01
Payer: MEDICARE

## 2025-07-02 ENCOUNTER — PATIENT OUTREACH (OUTPATIENT)
Dept: PRIMARY CARE | Facility: CLINIC | Age: 75
End: 2025-07-02
Payer: MEDICARE

## 2025-07-02 NOTE — PROGRESS NOTES
Successful outreach to patient regarding hospitalization as patient continues TCM program.   At time of outreach call the patient feels as if their condition has improved since initial visit with PCP or specialist.  Questions or concerns addressed at this time with patient.      -Verified upcoming pulmonology appt 8/6/2025 1000    Pt last PCP appt 6/30/2025. Pt saw cardiologist 6/26/2025. Pt reports appts went well and denies any questions regarding doctor visits.  Pt states she was able to  prescribed eye drops.    Pt reports she has her medication and everything she needs at home.    Pt denies any questions, needs, or concerns at this time. She is encouraged to call if questions or needs arise.

## 2025-07-07 ENCOUNTER — HOSPITAL ENCOUNTER (OUTPATIENT)
Dept: CARDIOLOGY | Facility: CLINIC | Age: 75
Discharge: HOME | End: 2025-07-07
Payer: MEDICARE

## 2025-07-07 DIAGNOSIS — Z95.818 IMPLANTABLE LOOP RECORDER PRESENT: ICD-10-CM

## 2025-07-07 DIAGNOSIS — I48.0 PAROXYSMAL ATRIAL FIBRILLATION (MULTI): ICD-10-CM

## 2025-07-07 PROCEDURE — 93298 REM INTERROG DEV EVAL SCRMS: CPT

## 2025-07-10 ENCOUNTER — APPOINTMENT (OUTPATIENT)
Dept: PRIMARY CARE | Facility: CLINIC | Age: 75
End: 2025-07-10
Payer: MEDICARE

## 2025-07-15 ENCOUNTER — HOSPITAL ENCOUNTER (OUTPATIENT)
Dept: CARDIOLOGY | Facility: CLINIC | Age: 75
Discharge: HOME | End: 2025-07-15
Payer: MEDICARE

## 2025-07-15 DIAGNOSIS — Z95.818 IMPLANTABLE LOOP RECORDER PRESENT: ICD-10-CM

## 2025-07-15 DIAGNOSIS — I48.0 PAROXYSMAL ATRIAL FIBRILLATION (MULTI): ICD-10-CM

## 2025-07-16 DIAGNOSIS — I50.32 CHRONIC DIASTOLIC (CONGESTIVE) HEART FAILURE: ICD-10-CM

## 2025-07-16 NOTE — TELEPHONE ENCOUNTER
Pt requested refill and an X-ray order at [facility],  pt ruptured her right quad. Pt is having pain and fell back in November.

## 2025-07-17 ENCOUNTER — APPOINTMENT (OUTPATIENT)
Dept: WOUND CARE | Facility: CLINIC | Age: 75
End: 2025-07-17
Payer: MEDICARE

## 2025-07-17 ENCOUNTER — HOSPITAL ENCOUNTER (OUTPATIENT)
Dept: RADIOLOGY | Facility: CLINIC | Age: 75
Discharge: HOME | End: 2025-07-17
Payer: MEDICARE

## 2025-07-17 ENCOUNTER — OFFICE VISIT (OUTPATIENT)
Dept: ORTHOPEDIC SURGERY | Facility: CLINIC | Age: 75
End: 2025-07-17
Payer: MEDICARE

## 2025-07-17 DIAGNOSIS — M17.11 OSTEOARTHRITIS OF RIGHT KNEE, UNSPECIFIED OSTEOARTHRITIS TYPE: Primary | ICD-10-CM

## 2025-07-17 DIAGNOSIS — M25.561 RIGHT KNEE PAIN, UNSPECIFIED CHRONICITY: ICD-10-CM

## 2025-07-17 PROCEDURE — 99212 OFFICE O/P EST SF 10 MIN: CPT | Performed by: FAMILY MEDICINE

## 2025-07-17 PROCEDURE — 73562 X-RAY EXAM OF KNEE 3: CPT | Mod: RT

## 2025-07-17 RX ORDER — METHYLPREDNISOLONE 4 MG/1
TABLET ORAL
Qty: 1 EACH | Refills: 0 | Status: SHIPPED | OUTPATIENT
Start: 2025-07-17

## 2025-07-17 RX ORDER — FUROSEMIDE 40 MG/1
40 TABLET ORAL DAILY
Qty: 90 TABLET | Refills: 3 | Status: SHIPPED | OUTPATIENT
Start: 2025-07-17 | End: 2026-07-12

## 2025-07-17 NOTE — TELEPHONE ENCOUNTER
1) Pt req refill of Furosemide 40mg, takes 1 daily. Send 90 day supply to mail order.   2) Pt c/o L knee pain. Pt fell Nov 2024 but did not discuss knee pain with Dr. Marti at last office visit. Offered to make pt appointment with Dr. Marti but pt said she was in too much pain to get out of the car when she comes to see Dr. Marti. Pt wanted to know about orthopedic. I gave her the phone number for [facility] and advised that with her insurance they may be able to make an appointment without pcp referral. Pt will check and if she needs referral. Disregard previous message requesting x-ray for knee.

## 2025-07-18 NOTE — TELEPHONE ENCOUNTER
Pt went yesterday to  Orthopaedics walk in clinic to get X-ray and they stated it might be arthritis.

## 2025-07-18 NOTE — PROGRESS NOTES
Acute Injury New Patient Visit  Assessment & Plan  Knee pain with possible arthritis or meniscus injury  Knee pain with lateral involvement, possible arthritis or meniscus injury. X-rays show patellar involvement and scar tissue. High pain tolerance noted.  - Prescribed short course of oral steroids.  - Provided simple hinge knee brace.  - Advised icing over heat application.  - Provided home knee strengthening exercises.  - Plan MRI if pain persists post-treatment.  - Consider injection if pain persists after 2-3 weeks.  - Follow-up with Dr. Roberson in 10-11 days.    Leg swelling  Increased leg swelling despite diuretics. No history of DVTs. On anticoagulants.  - Continue current diuretic regimen.    Orders Placed This Encounter    Knee Brace, Hinged    XR knee right 3 views    methylPREDNISolone (Medrol Dospak) 4 mg tablets     Procedures   At the conclusion of the visit there were no further questions by the patient/family regarding their plan of care.  Patient was instructed to call or return with any issues, questions, or concerns regarding their injury and/or treatment plan described above.    PHYSICAL EXAM:  General:  Patient is awake, alert, and oriented to person place and time.  Patient appears well nourished and well kept.  Affect Calm, Not Acutely Distressed.  Heent:  Normocephalic, Atraumatic, EOMI  Cardiovascular:  Hemodynamically stable.  Respiratory:  Normal respirations with unlabored breathing.  Neuro: Gross sensation intact to the lower extremities bilaterally.  Extremity: Right knee exam: The affected knee was examined and inspected and was tender to the touch along the medial and lateral aspect with catching, locking or mechanical symptoms. The skin was intact without breakdown or open wound. Old incisions if present were healed. There was a mild Ravi exam seen with some evidence of instability & weakness in the collateral ligaments with varus/valgus stress & laxity in the anterior or  posterior planes. There was a negative Lachman´s test, pivot shift test and posterior drawer sign with no foot drop, numbness or tingling. Sensation, reflexes and pulses in the foot and ankle are preserved. There was an effusion. Range of motion showed good straight leg raise with flexion to 95 degrees and extension to 5 degrees. The patient had the ability to bear weight, but with discomfort. The patient´s gait was antalgic secondary to the discomfort.  Physical Exam  EXTREMITIES: Calf swollen.  MUSCULOSKELETAL: Knee pain on palpation.    IMAGING:   Results  RADIOLOGY  Knee X-ray: Patellar involvement with scar tissue; no significant internal soft tissue abnormalities observed.  XR knee right 3 views  Narrative: Interpreted By:  Budinsky, Cole,   STUDY:  XR KNEE RIGHT 3 VIEWS; ;  7/17/2025 1:52 pm      INDICATION:  Signs/Symptoms:pain.      ACCESSION NUMBER(S):  [id number]      ORDERING CLINICIAN:  COLE BUDINSKY      Impression: Three views right knee demonstrate no obvious presence for acute  fracture or dislocation. Osteoarthritic changes seen throughout the  tri compartments with medial joint space narrowing. Osteophytes also  seen at the lateral joint. Lateral patellofemoral arthritic changes  are also seen osteophyte present at the lateral band suprapatellar  levels. Question small joint effusion.          Signed by: Cole Budinsky 7/17/2025 5:30 PM  Dictation workstation:   [id number]      Patient ID: Helen Johnston is a 74 y.o. female who presents for Pain of the Right Knee (Right Knee Pain x 1.5 Wks, Medial - No Trauma - X-Rays Today).  History of Present Illness  Helen Johnston is a 74 year old female who presents with knee pain and swelling.    She has been experiencing knee pain and swelling for the past week, localized to the side of the knee, reminiscent of the stiffness felt after her quadriceps tendon rupture in 2006. Despite taking diuretics, there is increased leg swelling. She recalls a fall in  November where she hit her knee, hip, elbow, and shoulder but did not experience pain, bruising, or swelling the following day. No recent trauma, slipping, or pushing with her leg that could have exacerbated the knee pain.    She has been using Tylenol, heat, cold therapy, and a pull-on brace without relief. She mentions difficulty putting on socks due to lack of strength and interference from wires in her knee from previous surgery. Standing to do dishes is challenging due to the pain.    Her past medical history includes a ruptured quadriceps tendon in 2006, repaired with wires. She denies any history of blood clots or deep vein thrombosis and is currently on a blood thinner, with no missed doses.    No fever, chills, or diabetes, although she mentions being 'probably borderline' for diabetes. She reports a high tolerance for pain but notes increased pressure and pain in the knee.        This medical note was created with the assistance of artificial intelligence (AI) for documentation purposes. The content has been reviewed and confirmed by the healthcare provider for accuracy and completeness. Patient consented to the use of audio recording and use of AI during their visit.   07/17/25 at 9:07 PM - Cole C Budinsky, MD  Office:  [phone]

## 2025-07-28 ENCOUNTER — HOSPITAL ENCOUNTER (OUTPATIENT)
Dept: CARDIOLOGY | Facility: CLINIC | Age: 75
Discharge: HOME | End: 2025-07-28
Payer: MEDICARE

## 2025-07-28 ENCOUNTER — OFFICE VISIT (OUTPATIENT)
Dept: ORTHOPEDIC SURGERY | Facility: CLINIC | Age: 75
End: 2025-07-28
Payer: MEDICARE

## 2025-07-28 DIAGNOSIS — I48.0 PAROXYSMAL ATRIAL FIBRILLATION (MULTI): ICD-10-CM

## 2025-07-28 DIAGNOSIS — Z95.818 IMPLANTABLE LOOP RECORDER PRESENT: ICD-10-CM

## 2025-07-28 DIAGNOSIS — M25.561 RIGHT KNEE PAIN, UNSPECIFIED CHRONICITY: ICD-10-CM

## 2025-07-28 DIAGNOSIS — M17.11 OSTEOARTHRITIS OF RIGHT KNEE, UNSPECIFIED OSTEOARTHRITIS TYPE: ICD-10-CM

## 2025-07-28 PROCEDURE — 20610 DRAIN/INJ JOINT/BURSA W/O US: CPT | Mod: RT | Performed by: ORTHOPAEDIC SURGERY

## 2025-07-28 PROCEDURE — 99214 OFFICE O/P EST MOD 30 MIN: CPT | Performed by: ORTHOPAEDIC SURGERY

## 2025-07-28 PROCEDURE — 99212 OFFICE O/P EST SF 10 MIN: CPT | Mod: 25 | Performed by: ORTHOPAEDIC SURGERY

## 2025-07-28 PROCEDURE — 2500000004 HC RX 250 GENERAL PHARMACY W/ HCPCS (ALT 636 FOR OP/ED): Performed by: ORTHOPAEDIC SURGERY

## 2025-07-28 RX ORDER — LIDOCAINE HYDROCHLORIDE 10 MG/ML
2 INJECTION, SOLUTION INFILTRATION; PERINEURAL
Status: COMPLETED | OUTPATIENT
Start: 2025-07-28 | End: 2025-07-28

## 2025-07-28 RX ORDER — TRIAMCINOLONE ACETONIDE 40 MG/ML
40 INJECTION, SUSPENSION INTRA-ARTICULAR; INTRAMUSCULAR
Status: COMPLETED | OUTPATIENT
Start: 2025-07-28 | End: 2025-07-28

## 2025-07-28 RX ADMIN — TRIAMCINOLONE ACETONIDE 40 MG: 40 INJECTION, SUSPENSION INTRA-ARTICULAR; INTRAMUSCULAR at 14:30

## 2025-07-28 RX ADMIN — LIDOCAINE HYDROCHLORIDE 2 ML: 10 INJECTION, SOLUTION INFILTRATION; PERINEURAL at 14:30

## 2025-07-28 NOTE — PROGRESS NOTES
History of Present Illness:  Patient with known osteoarthritis of the knee who presents today for repeat evaluation.  The patient notes persistent pain as well as some occasional mechanical symptoms.  The patient has tried the following modalities: Rest, ice, anti-inflammatories.    She endorses a previous pertinent history of the right knee of prior quadriceps tendon rupture in 2006 with subsequent repair.  She notes that she has lacked a significant amount of flexion, states about 90 degrees of flexion is common for her at this point.  She also endorses some chronic bilateral lower extremity venous insufficiency and issues with delayed healing/wound healing.     Review of Systems:   GENERAL: Negative for malaise, significant weight loss, fever  MUSCULOSKELETAL: see HPI  NEURO:  Negative    Physical Examination:  Bilateral Knee:  Skin healthy and intact  No gross swelling or ecchymosis proximally, there is swelling and discoloration with dusky pigmentation of the distal legs bilaterally, additionally there is erythematous mid leg bilaterally  Alignment: Neutral   Effusion: Trace  ROM:  full on left, 0-95 on right  Crepitance with range of motion  No pain with internal rotation of the hip  Tenderness to palpation: over medial and lateral joint line and with patellar compression     No laxity to valgus stress  No laxity to varus stress  Negative Lachman´s test  Negative posterior drawer test  Mild pain with Ravi´s test    1+ pulses distally noted  2+ pitting edema  Intact neuroexam distally    Imaging:  Reviewed, degenerative joint disease noted severe patellofemoral with moderate medial femoral-tibial    Assessment:  Patient with known osteoarthritis of the right knee    Plan:  We reviewed an evidence-based approach to OA of the knee.  We discussed past treatments as well options for future treatment.  We discussed NSAIDS (risks and benefits), low impact activities.     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  Discussed intra-articular corticosteroid injections, anti-inflammatories, rest ice and physical therapy.  She is unable to take NSAIDs due to Xarelto use, she declined intra-articular injection today.  Reviewed options for physical therapy.  We discussed that she is not a candidate for surgery at this point in time, if she were to consider options for larger surgical options including total knee arthroplasty down the road we would have her get a formal evaluation with vascular for venous/arterial flow.    Narendra Mcneal PA-C     In a face to face encounter, I evaluated the patient and performed a physical examination, discussed pertinent diagnostic studies if indicated and discussed diagnosis and management strategies with both the patient and physician assistant / nurse practitioner.  I reviewed the PA/NP's note and agree with the documented findings and plan of care.    Patient with moderate arthritis predominately patellofemoral.  We discussed physical therapy for quadricep strengthening and patellar tracking, discussed corticosteroid injection as well.    Mikel Roberson MD    L Inj/Asp: R knee on 7/28/2025 2:30 PM  Indications: pain  Details: 22 G needle, anteromedial approach  Medications: 2 mL lidocaine 10 mg/mL (1 %); 40 mg triamcinolone acetonide 40 mg/mL  Outcome: tolerated well, no immediate complications  Procedure, treatment alternatives, risks and benefits explained, specific risks discussed. Consent was given by the patient. Immediately prior to procedure a time out was called to verify the correct patient, procedure, equipment, support staff and site/side marked as required. Patient was prepped and draped in the usual sterile fashion.

## 2025-07-30 DIAGNOSIS — I48.0 PAROXYSMAL ATRIAL FIBRILLATION (MULTI): ICD-10-CM

## 2025-07-30 RX ORDER — METOPROLOL SUCCINATE 50 MG/1
50 TABLET, EXTENDED RELEASE ORAL NIGHTLY
Qty: 90 TABLET | Refills: 3 | Status: SHIPPED | OUTPATIENT
Start: 2025-07-30 | End: 2026-07-30

## 2025-08-05 ENCOUNTER — HOSPITAL ENCOUNTER (OUTPATIENT)
Dept: CARDIOLOGY | Facility: CLINIC | Age: 75
Discharge: HOME | End: 2025-08-05
Payer: MEDICARE

## 2025-08-05 DIAGNOSIS — Z95.818 IMPLANTABLE LOOP RECORDER PRESENT: ICD-10-CM

## 2025-08-05 DIAGNOSIS — I48.0 PAROXYSMAL ATRIAL FIBRILLATION (MULTI): ICD-10-CM

## 2025-08-06 ENCOUNTER — APPOINTMENT (OUTPATIENT)
Facility: CLINIC | Age: 75
End: 2025-08-06
Payer: MEDICARE

## 2025-08-18 ENCOUNTER — HOSPITAL ENCOUNTER (OUTPATIENT)
Dept: CARDIOLOGY | Facility: CLINIC | Age: 75
Discharge: HOME | End: 2025-08-18
Payer: MEDICARE

## 2025-08-18 DIAGNOSIS — Z95.818 IMPLANTABLE LOOP RECORDER PRESENT: ICD-10-CM

## 2025-08-18 DIAGNOSIS — I48.0 PAROXYSMAL ATRIAL FIBRILLATION (MULTI): ICD-10-CM

## 2025-08-18 PROCEDURE — 93298 REM INTERROG DEV EVAL SCRMS: CPT

## 2025-08-20 ENCOUNTER — APPOINTMENT (OUTPATIENT)
Facility: CLINIC | Age: 75
End: 2025-08-20
Payer: MEDICARE

## 2025-08-20 VITALS
RESPIRATION RATE: 18 BRPM | WEIGHT: 257 LBS | BODY MASS INDEX: 45.54 KG/M2 | OXYGEN SATURATION: 87 % | SYSTOLIC BLOOD PRESSURE: 124 MMHG | DIASTOLIC BLOOD PRESSURE: 75 MMHG | HEIGHT: 63 IN | HEART RATE: 86 BPM

## 2025-08-20 DIAGNOSIS — R09.02 HYPOXIA: ICD-10-CM

## 2025-08-20 DIAGNOSIS — J44.89 COPD WITH ASTHMA (MULTI): Primary | ICD-10-CM

## 2025-08-20 PROCEDURE — G2211 COMPLEX E/M VISIT ADD ON: HCPCS

## 2025-08-20 PROCEDURE — 3074F SYST BP LT 130 MM HG: CPT

## 2025-08-20 PROCEDURE — 3008F BODY MASS INDEX DOCD: CPT

## 2025-08-20 PROCEDURE — 3078F DIAST BP <80 MM HG: CPT

## 2025-08-20 PROCEDURE — 1159F MED LIST DOCD IN RCRD: CPT

## 2025-08-20 PROCEDURE — 99214 OFFICE O/P EST MOD 30 MIN: CPT

## 2025-08-20 ASSESSMENT — PATIENT HEALTH QUESTIONNAIRE - PHQ9
SUM OF ALL RESPONSES TO PHQ9 QUESTIONS 1 AND 2: 1
2. FEELING DOWN, DEPRESSED OR HOPELESS: SEVERAL DAYS
1. LITTLE INTEREST OR PLEASURE IN DOING THINGS: NOT AT ALL

## 2025-08-20 ASSESSMENT — ENCOUNTER SYMPTOMS
DEPRESSION: 1
LOSS OF SENSATION IN FEET: 0
OCCASIONAL FEELINGS OF UNSTEADINESS: 1

## 2025-08-20 ASSESSMENT — COPD QUESTIONNAIRES
QUESTION1_COUGHFREQUENCY: 1
QUESTION2_CHESTPHLEGM: 0 - I HAVE NO PHLEGM (MUCUS) IN MY CHEST AT ALL
QUESTION3_CHESTTIGHTNESS: 1
QUESTION8_ENERGYLEVEL: 3
QUESTION7_SLEEPQUALITY: 0 - I SLEEP SOUNDLY
CAT_TOTALSCORE: 11
QUESTION4_WALKINCLINE: 4
QUESTION5_HOMEACTIVITIES: 2
QUESTION6_LEAVINGHOUSE: 0 - I AM CONFIDENT LEAVING MY HOME DESPITE MY LUNG CONDITION

## 2025-09-02 ENCOUNTER — HOSPITAL ENCOUNTER (OUTPATIENT)
Dept: CARDIOLOGY | Facility: CLINIC | Age: 75
Discharge: HOME | End: 2025-09-02
Payer: MEDICARE

## 2025-09-02 DIAGNOSIS — Z95.818 IMPLANTABLE LOOP RECORDER PRESENT: ICD-10-CM

## 2025-09-02 DIAGNOSIS — I48.0 PAROXYSMAL ATRIAL FIBRILLATION (MULTI): ICD-10-CM

## 2025-10-07 ENCOUNTER — APPOINTMENT (OUTPATIENT)
Dept: SLEEP MEDICINE | Facility: HOSPITAL | Age: 75
End: 2025-10-07
Payer: MEDICARE

## 2025-10-14 ENCOUNTER — APPOINTMENT (OUTPATIENT)
Dept: SLEEP MEDICINE | Facility: HOSPITAL | Age: 75
End: 2025-10-14
Payer: MEDICARE

## 2025-11-03 ENCOUNTER — APPOINTMENT (OUTPATIENT)
Dept: PRIMARY CARE | Facility: CLINIC | Age: 75
End: 2025-11-03
Payer: MEDICARE

## 2025-12-04 ENCOUNTER — APPOINTMENT (OUTPATIENT)
Dept: CARDIOLOGY | Facility: CLINIC | Age: 75
End: 2025-12-04
Payer: MEDICARE

## 2026-02-24 ENCOUNTER — APPOINTMENT (OUTPATIENT)
Facility: CLINIC | Age: 76
End: 2026-02-24
Payer: MEDICARE